# Patient Record
Sex: FEMALE | Race: WHITE | NOT HISPANIC OR LATINO | Employment: OTHER | URBAN - METROPOLITAN AREA
[De-identification: names, ages, dates, MRNs, and addresses within clinical notes are randomized per-mention and may not be internally consistent; named-entity substitution may affect disease eponyms.]

---

## 2017-08-10 ENCOUNTER — OFFICE VISIT (OUTPATIENT)
Dept: INTERNAL MEDICINE | Facility: CLINIC | Age: 67
End: 2017-08-10

## 2017-08-10 VITALS
DIASTOLIC BLOOD PRESSURE: 93 MMHG | TEMPERATURE: 98.6 F | SYSTOLIC BLOOD PRESSURE: 163 MMHG | HEART RATE: 77 BPM | OXYGEN SATURATION: 94 % | WEIGHT: 169 LBS | RESPIRATION RATE: 18 BRPM | BODY MASS INDEX: 29.94 KG/M2

## 2017-08-10 DIAGNOSIS — R63.4 WEIGHT LOSS: ICD-10-CM

## 2017-08-10 DIAGNOSIS — Z79.4 TYPE 2 DIABETES MELLITUS WITHOUT COMPLICATION, WITH LONG-TERM CURRENT USE OF INSULIN (H): ICD-10-CM

## 2017-08-10 DIAGNOSIS — E11.9 TYPE 2 DIABETES MELLITUS WITHOUT COMPLICATION, WITH LONG-TERM CURRENT USE OF INSULIN (H): ICD-10-CM

## 2017-08-10 DIAGNOSIS — J01.40 SUBACUTE PANSINUSITIS: ICD-10-CM

## 2017-08-10 DIAGNOSIS — J45.901 ASTHMA EXACERBATION: Primary | ICD-10-CM

## 2017-08-10 DIAGNOSIS — I10 BENIGN ESSENTIAL HYPERTENSION: ICD-10-CM

## 2017-08-10 LAB
ALBUMIN SERPL-MCNC: 3.4 G/DL (ref 3.4–5)
ALP SERPL-CCNC: 101 U/L (ref 40–150)
ALT SERPL W P-5'-P-CCNC: 18 U/L (ref 0–50)
ANION GAP SERPL CALCULATED.3IONS-SCNC: 9 MMOL/L (ref 3–14)
AST SERPL W P-5'-P-CCNC: 8 U/L (ref 0–45)
BILIRUB SERPL-MCNC: 0.4 MG/DL (ref 0.2–1.3)
BUN SERPL-MCNC: 23 MG/DL (ref 7–30)
CALCIUM SERPL-MCNC: 9.3 MG/DL (ref 8.5–10.1)
CHLORIDE SERPL-SCNC: 99 MMOL/L (ref 94–109)
CO2 SERPL-SCNC: 27 MMOL/L (ref 20–32)
CREAT SERPL-MCNC: 0.86 MG/DL (ref 0.52–1.04)
GFR SERPL CREATININE-BSD FRML MDRD: 66 ML/MIN/1.7M2
GLUCOSE SERPL-MCNC: 323 MG/DL (ref 70–99)
HBA1C MFR BLD: 12.8 % (ref 4.3–6)
POTASSIUM SERPL-SCNC: 4.4 MMOL/L (ref 3.4–5.3)
PROT SERPL-MCNC: 7.6 G/DL (ref 6.8–8.8)
SODIUM SERPL-SCNC: 135 MMOL/L (ref 133–144)
TSH SERPL DL<=0.005 MIU/L-ACNC: 1.44 MU/L (ref 0.4–4)

## 2017-08-10 RX ORDER — AMOXICILLIN AND CLAVULANATE POTASSIUM 500; 125 MG/1; MG/1
1 TABLET, FILM COATED ORAL 2 TIMES DAILY
Qty: 20 TABLET | Refills: 0 | Status: SHIPPED | OUTPATIENT
Start: 2017-08-10 | End: 2017-09-25

## 2017-08-10 RX ORDER — CARVEDILOL 6.25 MG/1
6.25 TABLET ORAL 2 TIMES DAILY WITH MEALS
Qty: 60 TABLET | Refills: 1 | Status: SHIPPED | OUTPATIENT
Start: 2017-08-10 | End: 2017-08-17

## 2017-08-10 RX ORDER — PREDNISONE 20 MG/1
20 TABLET ORAL DAILY
Qty: 9 TABLET | Refills: 0 | Status: SHIPPED | OUTPATIENT
Start: 2017-08-10 | End: 2017-08-17

## 2017-08-10 ASSESSMENT — PAIN SCALES - GENERAL: PAINLEVEL: SEVERE PAIN (6)

## 2017-08-10 NOTE — MR AVS SNAPSHOT
After Visit Summary   8/10/2017    Bettina Guerrero    MRN: [id number]           Patient Information     Date Of Birth          1950        Visit Information        Provider Department      8/10/2017 9:00 AM Trinity Choi APRN [facility]        Today's Diagnoses     Asthma exacerbation    -  1    Subacute pansinusitis        Weight loss        Type 2 diabetes mellitus without complication, with long-term current use of insulin (H)          Care Instructions    [facility] Medication Refill Request Information:  * Please contact your pharmacy regarding ANY request for medication refills.  ** [facility] Prescription Fax = [phone]  * Please allow 3 business days for routine medication refills.  * Please allow 5 business days for controlled substance medication refills.     [facility] Test Result notification information:  *You will be notified with in 7-10 days of your appointment day regarding the results of your test.  If you are on MyChart you will be notified as soon as the provider has reviewed the results and signed off on them.    Primary Care Center [phone]       Sinusitis (Antibiotic Treatment)    The sinuses are air-filled spaces within the bones of the face. They connect to the inside of the nose. Sinusitis is an inflammation of the tissue lining the sinus cavity. Sinus inflammation can occur during a cold. It can also be due to allergies to pollens and other particles in the air. Sinusitis can cause symptoms of sinus congestion and fullness. A sinus infection causes fever, headache and facial pain. There is often green or yellow drainage from the nose or into the back of the throat (post-nasal drip). You have been given antibiotics to treat this condition.  Home care:    Take the full course of antibiotics as instructed. Do not stop taking them, even if you feel better.    Drink plenty of water, hot tea, and other liquids. This may  help thin mucus. It also may promote sinus drainage.    Heat may help soothe painful areas of the face. Use a towel soaked in hot water. Or,  the shower and direct the hot spray onto your face. Using a vaporizer along with a menthol rub at night may also help.     An expectorant containing guaifenesin may help thin the mucus and promote drainage from the sinuses.    Over-the-counter decongestants may be used unless a similar medicine was prescribed. Nasal sprays work the fastest. Use one that contains phenylephrine or oxymetazoline. First blow the nose gently. Then use the spray. Do not use these medicines more often than directed on the label or symptoms may get worse. You may also use tablets containing pseudoephedrine. Avoid products that combine ingredients, because side effects may be increased. Read labels. You can also ask the pharmacist for help. (NOTE: Persons with high blood pressure should not use decongestants. They can raise blood pressure.)    Over-the-counter antihistamines may help if allergies contributed to your sinusitis.      Do not use nasal rinses or irrigation during an acute sinus infection, unless told to by your health care provider. Rinsing may spread the infection to other sinuses.    Use acetaminophen or ibuprofen to control pain, unless another pain medicine was prescribed. (If you have chronic liver or kidney disease or ever had a stomach ulcer, talk with your doctor before using these medicines. Aspirin should never be used in anyone under 18 years of age who is ill with a fever. It may cause severe liver damage.)    Don't smoke. This can worsen symptoms.  Follow-up care  Follow up with your healthcare provider or our staff if you are not improving within the next week.  When to seek medical advice  Call your healthcare provider if any of these occur:    Facial pain or headache becoming more severe    Stiff neck    Unusual drowsiness or confusion    Swelling of the forehead or  eyelids    Vision problems, including blurred or double vision    Fever of 100.4 F (38 C) or higher, or as directed by your healthcare provider    Seizure    Breathing problems    Symptoms not resolving within 10 days  Date Last Reviewed: 4/13/2015 2000-2017 [employer]. [address] [place] [address]. All rights reserved. This information is not intended as a substitute for professional medical care. Always follow your healthcare professional's instructions.                  Follow-ups after your visit        Follow-up notes from your care team     Return in about 1 week (around 8/17/2017) for diabetes.      Your next 10 appointments already scheduled     Aug 10, 2017 10:00 AM CDT   LAB with [facility] ([facility])    [address]  [address] [facility] [address] [phone]           Patient must bring picture ID. Patient should be prepared to give a urine specimen  Please do not eat 10-12 hours before your appointment if you are coming in fasting for labs on lipids, cholesterol, or glucose (sugar). Pregnant women should follow their Care Team instructions. Water with medications is okay. Do not drink coffee or other fluids. If you have concerns about taking  your medications, please ask at office or if scheduling via [employer], send a message by clicking on Secure Messaging, Message Your Care Team.            Aug 17, 2017 10:00 AM CDT   (Arrive by 9:45 AM)   ACUTE/CHRONIC SINGLE CONDITION with BROOKE Heredia CNP   [facility] ([facility])    [address] [place] [address]  [address] [facility] [address] [phone]              Future tests that were ordered for you today     Open Future Orders        Priority Expected Expires Ordered    Hemoglobin A1c Routine 8/10/2017 8/24/2017 8/10/2017    TSH with free T4 reflex Routine 8/10/2017 8/24/2017 8/10/2017    Comprehensive metabolic  panel Routine 8/10/2017 2017 8/10/2017            Who to contact     Please call your clinic at [phone] to:    Ask questions about your health    Make or cancel appointments    Discuss your medicines    Learn about your test results    Speak to your doctor   If you have compliments or concerns about an experience at your clinic, or if you wish to file a complaint, please contact [facility] Patient Relations at [phone] or email us at [email or web address]         Additional Information About Your Visit        [employer] Information     [employer] is an electronic gateway that provides easy, online access to your medical records. With [employer], you can request a clinic appointment, read your test results, renew a prescription or communicate with your care team.     To sign up for [employer] visit the website at [email or web address]   You will be asked to enter the access code listed below, as well as some personal information. Please follow the directions to create your username and password.     Your access code is: [email or web address]  Expires: 2017  6:31 AM     Your access code will  in 90 days. If you need help or a new code, please contact your [facility] or call [phone] for assistance.        Care EveryWhere ID     This is your Care EveryWhere ID. This could be used by other organizations to access your [facility] medical records  [id number]        Your Vitals Were     Pulse Temperature Respirations Pulse Oximetry Breastfeeding? BMI (Body Mass Index)    77 98.6  F (37  C) (Oral) 18 94% No 29.94 kg/m2       Blood Pressure from Last 3 Encounters:   08/10/17 (!) 175/119   16 (!) 167/96   10/18/16 118/80    Weight from Last 3 Encounters:   08/10/17 76.7 kg (169 lb)   16 79.4 kg (175 lb)   10/18/16 79.4 kg (175 lb)                 Today's Medication Changes          These changes are accurate as of: 8/10/17  9:57 AM.   If you have any questions, ask your nurse or doctor.               Start taking these medicines.        Dose/Directions    amoxicillin-clavulanate 500-125 MG per tablet   Commonly known as:  AUGMENTIN   Used for:  Subacute pansinusitis   Started by:  Trinity Choi APRN CNP        Dose:  1 tablet   Take 1 tablet by mouth 2 times daily   Quantity:  20 tablet   Refills:  0       Blood Glucose Monitoring Suppl W/DEVICE Kit   Used for:  Type 2 diabetes mellitus without complication, with long-term current use of insulin (H)   Started by:  Trinity Choi APRN CNP        Dose:  1 Device   1 Device once for 1 dose   Quantity:  1 kit   Refills:  0       predniSONE 20 MG tablet   Commonly known as:  DELTASONE   Used for:  Asthma exacerbation   Started by:  Trinity Choi APRN CNP        Dose:  20 mg   Take 1 tablet (20 mg) by mouth daily   Quantity:  9 tablet   Refills:  0         Stop taking these medicines if you haven't already. Please contact your care team if you have questions.     pseudoePHEDrine 30 MG tablet   Commonly known as:  SUDAFED   Stopped by:  Trinity Choi APRN CNP                Where to get your medicines      These medications were sent to [id number] - [place], [place] - [address] AT [facility] & [address], [place] [address]     Phone:  [phone]     amoxicillin-clavulanate 500-125 MG per tablet    Blood Glucose Monitoring Suppl W/DEVICE Kit    predniSONE 20 MG tablet                Primary Care Provider Office Phone # Fax #    Mulu Tiana Castellon [phone]       [address] [address]        Equal Access to Services     [facility] : Nina Webber, waghislaine taylor, qaybta kaaldany desai. So [facility] [phone].    ATENCIÓN: Si habla español, tiene a rene disposición servicios  thais de asistencia lingüística. Walter hutton [phone].    We comply with applicable federal civil rights laws and [place] laws. We do not discriminate on the basis of race, color, national origin, age, disability sex, sexual orientation or gender identity.            Thank you!     Thank you for choosing [facility]  for your care. Our goal is always to provide you with excellent care. Hearing back from our patients is one way we can continue to improve our services. Please take a few minutes to complete the written survey that you may receive in the mail after your visit with us. Thank you!             Your Updated Medication List - Protect others around you: Learn how to safely use, store and throw away your medicines at www.disposemymeds.org.          This list is accurate as of: 8/10/17  9:57 AM.  Always use your most recent med list.                   Brand Name Dispense Instructions for use Diagnosis    albuterol 108 (90 BASE) MCG/ACT Inhaler    PROAIR HFA/PROVENTIL HFA/VENTOLIN HFA    1 Inhaler    Inhale 2 puffs into the lungs every 6 hours as needed for shortness of breath / dyspnea or wheezing    Allergic rhinitis due to pollen       amLODIPine 10 MG tablet    NORVASC    90 tablet    Take 1 tablet (10 mg) by mouth daily    Benign essential hypertension       amoxicillin-clavulanate 500-125 MG per tablet    AUGMENTIN    20 tablet    Take 1 tablet by mouth 2 times daily    Subacute pansinusitis       blood glucose monitoring lancets     1 Box    Please check your glucose twice daily    Diabetes mellitus without complication (H)       Blood Glucose Monitoring Suppl W/DEVICE Kit     1 kit    1 Device once for 1 dose    Type 2 diabetes mellitus without complication, with long-term current use of insulin (H)       blood glucose monitoring test strip    no brand specified    100 each    For Accu-Chek SmartView3 month supply, for testing glucose up to 4 times a day    Type 2 diabetes  mellitus without complication, with long-term current use of insulin (H)       hydrochlorothiazide 25 MG tablet    HYDRODIURIL    180 tablet    Take 2 tablets (50 mg) by mouth daily    Benign essential hypertension       HYDROcodone-acetaminophen 5-325 MG per tablet    NORCO    20 tablet    Take 1-2 tablets by mouth every 4 hours as needed for other (Moderate to Severe Pain)    Intraductal papilloma of right breast       insulin glargine 100 UNIT/ML injection    LANTUS SOLOSTAR    18 mL    Inject 18 Units Subcutaneous At Bedtime 18 units at bedtime.    Type 2 diabetes mellitus without complication, with long-term current use of insulin (H)       insulin lispro 100 UNIT/ML injection    HumaLOG KWIKpen    3 mL    Inject 10 Units Subcutaneous 3 times daily (before meals) Use 6 units with breakfast, 10 units with lunch and 8 units with dinner    Type 2 diabetes mellitus without complication, with long-term current use of insulin (H)       lisinopril 40 MG tablet    PRINIVIL/ZESTRIL    90 tablet    Take 1 tablet (40 mg) by mouth At Bedtime    Benign essential hypertension       metFORMIN 1000 MG tablet    GLUCOPHAGE    60 tablet    Take 1 tablet (1,000 mg) by mouth 2 times daily (with meals)    Type 2 diabetes mellitus without complication (H)       predniSONE 20 MG tablet    DELTASONE    9 tablet    Take 1 tablet (20 mg) by mouth daily    Asthma exacerbation       simvastatin 40 MG tablet    ZOCOR    90 tablet    Take 1 tablet (40 mg) by mouth At Bedtime    Mixed hyperlipidemia

## 2017-08-10 NOTE — PATIENT INSTRUCTIONS
[facility] Medication Refill Request Information:  * Please contact your pharmacy regarding ANY request for medication refills.  ** [facility] Prescription Fax = [phone]  * Please allow 3 business days for routine medication refills.  * Please allow 5 business days for controlled substance medication refills.     [facility] Test Result notification information:  *You will be notified with in 7-10 days of your appointment day regarding the results of your test.  If you are on MyChart you will be notified as soon as the provider has reviewed the results and signed off on them.    [facility] [phone]       Sinusitis (Antibiotic Treatment)    The sinuses are air-filled spaces within the bones of the face. They connect to the inside of the nose. Sinusitis is an inflammation of the tissue lining the sinus cavity. Sinus inflammation can occur during a cold. It can also be due to allergies to pollens and other particles in the air. Sinusitis can cause symptoms of sinus congestion and fullness. A sinus infection causes fever, headache and facial pain. There is often green or yellow drainage from the nose or into the back of the throat (post-nasal drip). You have been given antibiotics to treat this condition.  Home care:    Take the full course of antibiotics as instructed. Do not stop taking them, even if you feel better.    Drink plenty of water, hot tea, and other liquids. This may help thin mucus. It also may promote sinus drainage.    Heat may help soothe painful areas of the face. Use a towel soaked in hot water. Or,  the shower and direct the hot spray onto your face. Using a vaporizer along with a menthol rub at night may also help.     An expectorant containing guaifenesin may help thin the mucus and promote drainage from the sinuses.    Over-the-counter decongestants may be used unless a similar medicine was prescribed. Nasal sprays work the fastest. Use one that contains  phenylephrine or oxymetazoline. First blow the nose gently. Then use the spray. Do not use these medicines more often than directed on the label or symptoms may get worse. You may also use tablets containing pseudoephedrine. Avoid products that combine ingredients, because side effects may be increased. Read labels. You can also ask the pharmacist for help. (NOTE: Persons with high blood pressure should not use decongestants. They can raise blood pressure.)    Over-the-counter antihistamines may help if allergies contributed to your sinusitis.      Do not use nasal rinses or irrigation during an acute sinus infection, unless told to by your health care provider. Rinsing may spread the infection to other sinuses.    Use acetaminophen or ibuprofen to control pain, unless another pain medicine was prescribed. (If you have chronic liver or kidney disease or ever had a stomach ulcer, talk with your doctor before using these medicines. Aspirin should never be used in anyone under 18 years of age who is ill with a fever. It may cause severe liver damage.)    Don't smoke. This can worsen symptoms.  Follow-up care  Follow up with your healthcare provider or our staff if you are not improving within the next week.  When to seek medical advice  Call your healthcare provider if any of these occur:    Facial pain or headache becoming more severe    Stiff neck    Unusual drowsiness or confusion    Swelling of the forehead or eyelids    Vision problems, including blurred or double vision    Fever of 100.4 F (38 C) or higher, or as directed by your healthcare provider    Seizure    Breathing problems    Symptoms not resolving within 10 days  Date Last Reviewed: 4/13/2015 2000-2017 [employer]. [address] [place] [address], [place] [address]. All rights reserved. This information is not intended as a substitute for professional medical care. Always follow your healthcare professional's instructions.

## 2017-08-10 NOTE — NURSING NOTE
Chief Complaint   Patient presents with     URI     Patient is here for ongoing URI and sinus concerns, duration 2 weeks     Shaneka Kitchen CMA 8:52 AM on 8/10/2017.
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
## 2017-08-10 NOTE — LETTER
[facility]  [address]  [address]th [address]  [place] [address]          August 10, 2017    RE:  Bettina Guerrero                                                                                                                                                       [address]   [place] [address]            To whom it may concern:    Bettina Guerrero was seen in clinic on 8/10/17. She should be excused from work until 24-hours after starting antibiotics. She may return to work on 8/12/17.    Sincerely,        Trinity Choi, APRN, CNP

## 2017-08-17 ENCOUNTER — OFFICE VISIT (OUTPATIENT)
Dept: INTERNAL MEDICINE | Facility: CLINIC | Age: 67
End: 2017-08-17

## 2017-08-17 VITALS
WEIGHT: 166.3 LBS | RESPIRATION RATE: 20 BRPM | OXYGEN SATURATION: 96 % | DIASTOLIC BLOOD PRESSURE: 78 MMHG | BODY MASS INDEX: 29.46 KG/M2 | HEART RATE: 75 BPM | SYSTOLIC BLOOD PRESSURE: 136 MMHG

## 2017-08-17 DIAGNOSIS — Z23 NEED FOR PNEUMOCOCCAL VACCINATION: ICD-10-CM

## 2017-08-17 DIAGNOSIS — Z13.5 SCREENING FOR DIABETIC RETINOPATHY: ICD-10-CM

## 2017-08-17 DIAGNOSIS — E11.9 TYPE 2 DIABETES MELLITUS WITHOUT COMPLICATION, WITH LONG-TERM CURRENT USE OF INSULIN (H): Primary | ICD-10-CM

## 2017-08-17 DIAGNOSIS — E78.2 MIXED HYPERLIPIDEMIA: ICD-10-CM

## 2017-08-17 DIAGNOSIS — I10 BENIGN ESSENTIAL HYPERTENSION: ICD-10-CM

## 2017-08-17 DIAGNOSIS — Z79.4 TYPE 2 DIABETES MELLITUS WITHOUT COMPLICATION, WITH LONG-TERM CURRENT USE OF INSULIN (H): Primary | ICD-10-CM

## 2017-08-17 RX ORDER — SIMVASTATIN 40 MG
40 TABLET ORAL AT BEDTIME
Qty: 90 TABLET | Refills: 3 | Status: SHIPPED | OUTPATIENT
Start: 2017-08-17 | End: 2017-09-28 | Stop reason: ALTCHOICE

## 2017-08-17 RX ORDER — METFORMIN HCL 500 MG
500 TABLET, EXTENDED RELEASE 24 HR ORAL 2 TIMES DAILY WITH MEALS
Qty: 60 TABLET | Refills: 3 | Status: SHIPPED | OUTPATIENT
Start: 2017-08-17 | End: 2018-06-08

## 2017-08-17 RX ORDER — HYDROCHLOROTHIAZIDE 25 MG/1
50 TABLET ORAL DAILY
Qty: 180 TABLET | Refills: 3 | Status: SHIPPED | OUTPATIENT
Start: 2017-08-17 | End: 2018-06-08

## 2017-08-17 RX ORDER — AMLODIPINE BESYLATE 10 MG/1
10 TABLET ORAL DAILY
Qty: 90 TABLET | Refills: 3 | Status: SHIPPED | OUTPATIENT
Start: 2017-08-17 | End: 2018-06-08

## 2017-08-17 RX ORDER — LISINOPRIL 40 MG/1
40 TABLET ORAL AT BEDTIME
Qty: 90 TABLET | Refills: 3 | Status: SHIPPED | OUTPATIENT
Start: 2017-08-17 | End: 2018-06-08

## 2017-08-17 ASSESSMENT — PAIN SCALES - GENERAL: PAINLEVEL: NO PAIN (0)

## 2017-08-17 NOTE — NURSING NOTE
Chief Complaint   Patient presents with     Diabetes     pt is here for a diabetes follwo up        Catrina Guillen CMA at 9:59 AM on 8/17/2017

## 2017-08-17 NOTE — MR AVS SNAPSHOT
After Visit Summary   8/17/2017    Bettina Guerrero    MRN: [id number]           Patient Information     Date Of Birth          1950        Visit Information        Provider Department      8/17/2017 10:00 AM Trinity Choi APRN [facility]        Today's Diagnoses     Type 2 diabetes mellitus without complication, with long-term current use of insulin (H)    -  1    Screening for diabetic retinopathy        Benign essential hypertension        Mixed hyperlipidemia        Need for pneumococcal vaccination          Care Instructions    Primary Care Center Phone Number [phone]  Primary Care Center Medication Refill Request Information:  * Please contact your pharmacy regarding ANY request for medication refills.  ** [facility] Prescription Fax = [phone]  * Please allow 3 business days for routine medication refills.  * Please allow 5 business days for controlled substance medication refills.     Primary Care Center Test Result notification information:  *You will be notified with in 7-10 days of your appointment day regarding the results of your test.  If you are on MyChart you will be notified as soon as the provider has reviewed the results and signed off on them.        Restart your Metformin. We will do the extended release formula to help reduce side effects. Start with 500 mg (1 tablet) twice a day for 2 weeks, then increase to 1000 mg (2 tablets) twice a day and stay on that dose.              Follow-ups after your visit        Additional Services     DIABETES EDUCATOR REFERRAL       DIABETES SELF MANAGEMENT TRAINING (DSMT)      Your provider has referred you to Diabetes Education: [facility]: Endocrinology and Diabetes Clinic -  [facility] [phone]   [email or web address]    Type of training and number of hours: Previous Diagnosis: Follow-up DSMT - 2 hours.    Medicare covers: 10  hours of initial DSMT in 12 month period from the time of first visit, plus 2 hours of follow-up DSMT annually, and additional hours as requested for insulin training.    Diabetes Type: Type 2 - On Oral Medication             Diabetes Co-Morbidities: hypertension               A1C Goal:  <7.0       A1C is: Lab Results       Component                Value               Date                       A1C                      12.8                08/10/2017              If an urgent visit is needed or A1C is above 12, Care Team to call the Diabetes Education Team at [phone] or send an In Basket message to the Diabetes Education Pool (P DIAB ED-PATIENT CARE).    Diabetes Education Topics: Comprehensive Knowledge Assessment and Instruction    Special Educational Needs Requiring Individual DSMT: None       MEDICAL NUTRITION THERAPY (MNT) for Diabetes    Medical Nutrition Therapy with a Registered Dietitian can be provided in coordination with Diabetes Self-Management Training to assist in achieving optimal diabetes management.    MNT Type and Hours: Previous diagnosis: Annual follow-up MNT - 2 hours                       Medicare will cover: 3 hours initial MNT in 12 month period after first visit, plus 2 hours of follow-up MNT annually    Please be aware that coverage of these services is subject to the terms and limitations of your health insurance plan.  Call member services at your health plan to determine Diabetes Self-Management Training benefits and ask which blood glucose monitor brands are covered by your plan.      Please bring the following with you to your appointment:    (1)  List of current medications   (2)  List of Blood Glucose Monitor brands that are covered by your insurance plan  (3)  Blood Glucose Monitor and log book  (4)   Food records for the 3 days prior to your visit    The Certified Diabetes Educator may make diabetes medication adjustments per the CDE Protocol and Collaborative Practice  Agreement.                  Follow-up notes from your care team     Return in about 4 weeks (around 9/14/2017) for Physical Exam.      Your next 10 appointments already scheduled     Aug 24, 2017  3:30 PM CDT   (Arrive by 3:15 PM)   Office Visit with Sloane Alfred RN   [facility] ([facility])    [address] [place] [address]  [address] [facility] [address] [phone]           Bring a current list of meds and any records pertaining to this visit. For Physicals, please bring immunization records and any forms needing to be filled out. Please arrive 10 minutes early to complete paperwork.            Sep 14, 2017 10:30 AM CDT   (Arrive by 10:15 AM)   PHYSICAL with BROOKE Heredia CNP   [facility] Primary Care Clinic ([facility])    [address] [place] [address] [phone]              Who to contact     Please call your clinic at [phone] to:    Ask questions about your health    Make or cancel appointments    Discuss your medicines    Learn about your test results    Speak to your doctor   If you have compliments or concerns about an experience at your clinic, or if you wish to file a complaint, please contact [facility] Patient Relations at [phone] or email us at [email or web address]         Additional Information About Your Visit        MyChart Information     [employer] is an electronic gateway that provides easy, online access to your medical records. With [facility] [employer], you can request a clinic appointment, read your test results, renew a prescription or communicate with your care team.     To sign up for [employer] visit the website at [email or web address]   You will be asked to enter the access code listed below, as well as some personal information. Please follow the directions to create your username and password.     Your access code is: [email or web address]  Expires:  2017  6:31 AM     Your access code will  in 90 days. If you need help or a new code, please contact your [facility] or call [phone] for assistance.        Care EveryWhere ID     This is your Care EveryWhere ID. This could be used by other organizations to access your [place] medical records  [id number]        Your Vitals Were     Pulse Respirations Pulse Oximetry Breastfeeding? BMI (Body Mass Index)       75 20 96% No 29.46 kg/m2        Blood Pressure from Last 3 Encounters:   17 136/78   08/10/17 (!) 163/93   16 (!) 167/96    Weight from Last 3 Encounters:   17 75.4 kg (166 lb 4.8 oz)   08/10/17 76.7 kg (169 lb)   16 79.4 kg (175 lb)              We Performed the Following     DIABETES EDUCATOR REFERRAL     Pneumococcal vaccine 23 valent PPSV23  (Pneumovax) [37642]          Today's Medication Changes          These changes are accurate as of: 17 10:46 AM.  If you have any questions, ask your nurse or doctor.               Start taking these medicines.        Dose/Directions    metFORMIN 500 MG 24 hr tablet   Commonly known as:  GLUCOPHAGE-XR   Used for:  Type 2 diabetes mellitus without complication, with long-term current use of insulin (H)   Started by:  Trinity Choi APRN CNP        Dose:  500 mg   Take 1 tablet (500 mg) by mouth 2 times daily (with meals)   Quantity:  60 tablet   Refills:  3            Where to get your medicines      These medications were sent to [id number] - [place], [place] - [address], [place] [address]     Phone:  [phone]     amLODIPine 10 MG tablet    hydrochlorothiazide 25 MG tablet    lisinopril 40 MG tablet    metFORMIN 500 MG 24 hr tablet    simvastatin 40 MG tablet                Primary Care Provider Office Phone # Fax #    BROOKE Heredia [phone]       [address] [place] [address]  [facility] [address]        Equal Access to Services     IVORY FARAH : Hadii aad ku hadnoemyjermaine Sohyun, waaxda luqadaha, qaybta kaalmada hanane, dany reddy. So [facility] [phone].    ATENCIÓN: Si habla español, tiene a rene disposición servicios gratuitos de asistencia lingüística. Lvame al [phone].    We comply with applicable federal civil rights laws and [place] laws. We do not discriminate on the basis of race, color, national origin, age, disability sex, sexual orientation or gender identity.            Thank you!     Thank you for choosing [facility]  for your care. Our goal is always to provide you with excellent care. Hearing back from our patients is one way we can continue to improve our services. Please take a few minutes to complete the written survey that you may receive in the mail after your visit with us. Thank you!             Your Updated Medication List - Protect others around you: Learn how to safely use, store and throw away your medicines at www.disposemymeds.org.          This list is accurate as of: 8/17/17 10:46 AM.  Always use your most recent med list.                   Brand Name Dispense Instructions for use Diagnosis    albuterol 108 (90 BASE) MCG/ACT Inhaler    PROAIR HFA/PROVENTIL HFA/VENTOLIN HFA    1 Inhaler    Inhale 2 puffs into the lungs every 6 hours as needed for shortness of breath / dyspnea or wheezing    Allergic rhinitis due to pollen       amLODIPine 10 MG tablet    NORVASC    90 tablet    Take 1 tablet (10 mg) by mouth daily    Benign essential hypertension       amoxicillin-clavulanate 500-125 MG per tablet    AUGMENTIN    20 tablet    Take 1 tablet by mouth 2 times daily    Subacute pansinusitis       blood glucose monitoring lancets     100 each    Please check your glucose twice daily        blood glucose monitoring test strip    no brand specified    100 each    For Accu-Chek SmartView3 month supply, for  testing glucose up to 4 times a day    Type 2 diabetes mellitus without complication, with long-term current use of insulin (H)       hydrochlorothiazide 25 MG tablet    HYDRODIURIL    180 tablet    Take 2 tablets (50 mg) by mouth daily    Benign essential hypertension       insulin glargine 100 UNIT/ML injection    LANTUS SOLOSTAR    18 mL    Inject 18 Units Subcutaneous At Bedtime 18 units at bedtime.    Type 2 diabetes mellitus without complication, with long-term current use of insulin (H)       insulin lispro 100 UNIT/ML injection    HumaLOG KWIKpen    3 mL    Inject 10 Units Subcutaneous 3 times daily (before meals) Use 6 units with breakfast, 10 units with lunch and 8 units with dinner    Type 2 diabetes mellitus without complication, with long-term current use of insulin (H)       lisinopril 40 MG tablet    PRINIVIL/ZESTRIL    90 tablet    Take 1 tablet (40 mg) by mouth At Bedtime    Benign essential hypertension       metFORMIN 500 MG 24 hr tablet    GLUCOPHAGE-XR    60 tablet    Take 1 tablet (500 mg) by mouth 2 times daily (with meals)    Type 2 diabetes mellitus without complication, with long-term current use of insulin (H)       simvastatin 40 MG tablet    ZOCOR    90 tablet    Take 1 tablet (40 mg) by mouth At Bedtime    Mixed hyperlipidemia

## 2017-08-17 NOTE — PROGRESS NOTES
"Bettina Guerrero is a 67 year old female who comes in for    CC: lab follow-up  HPI:    Breathing has improved--no longer wheezing or SOB. Still has nasal congestion and drainage. Has ~3 more days of Augmentin.    At her last visit, Bettina had labs drawn and A1C was found to be elevated at 12.8. She has not been treating her diabetes for \"months\". Metformin--didn't feel she tolerated it well d/t diarrhea, has been off for 6 months to a year. Stopped the insulin a few months ago, couldn't afford it and thought it was making her hair fall out. \"[place] [facility] She does not check blood glucoses. She has been encouraged by weight loss--this has been unintentional. She denies vision changes, CP, neuropathy, or urinary changes.  Dr. Castellon wanted her coming to clinic every 3 months, but her  thought there was something wrong with her, and she didn't understand why she needed to come so frequently. She also couldn't afford to come so often. She hasn't returned for follow-up.     Other issues discussed today:     Patient Active Problem List   Diagnosis     Diabetes mellitus (H)     Hypertension     Asthma in adult     Excessive daytime sleepiness     Snoring     Hyperlipidemia LDL goal <100     Intraductal papilloma of right breast       Current Outpatient Prescriptions   Medication Sig Dispense Refill     metFORMIN (GLUCOPHAGE-XR) 500 MG 24 hr tablet Take 1 tablet (500 mg) by mouth 2 times daily (with meals) 60 tablet 3     lisinopril (PRINIVIL/ZESTRIL) 40 MG tablet Take 1 tablet (40 mg) by mouth At Bedtime 90 tablet 3     hydrochlorothiazide (HYDRODIURIL) 25 MG tablet Take 2 tablets (50 mg) by mouth daily 180 tablet 3     simvastatin (ZOCOR) 40 MG tablet Take 1 tablet (40 mg) by mouth At Bedtime 90 tablet 3     amLODIPine (NORVASC) 10 MG tablet Take 1 tablet (10 mg) by mouth daily 90 tablet 3     amoxicillin-clavulanate (AUGMENTIN) 500-125 MG per tablet Take 1 tablet by mouth 2 times " daily 20 tablet 0     blood glucose monitoring (ACCU-CHEK MULTICLIX) lancets Please check your glucose twice daily 100 each 3     blood glucose monitoring (NO BRAND SPECIFIED) test strip For Accu-Chek SmartView3 month supply, for testing glucose up to 4 times a day 100 each 0     albuterol (PROAIR HFA, PROVENTIL HFA, VENTOLIN HFA) 108 (90 BASE) MCG/ACT inhaler Inhale 2 puffs into the lungs every 6 hours as needed for shortness of breath / dyspnea or wheezing 1 Inhaler 1     insulin glargine (LANTUS SOLOSTAR) 100 UNIT/ML PEN Inject 18 Units Subcutaneous At Bedtime 18 units at bedtime. (Patient not taking: Reported on 8/17/2017) 18 mL 3     insulin lispro (HUMALOG KWIKPEN) 100 UNIT/ML soln Inject 10 Units Subcutaneous 3 times daily (before meals) Use 6 units with breakfast, 10 units with lunch and 8 units with dinner (Patient not taking: Reported on 8/17/2017) 3 mL 11     [DISCONTINUED] amLODIPine (NORVASC) 10 MG tablet Take 1 tablet (10 mg) by mouth daily 90 tablet 3     [DISCONTINUED] simvastatin (ZOCOR) 40 MG tablet Take 1 tablet (40 mg) by mouth At Bedtime 90 tablet 3     [DISCONTINUED] hydrochlorothiazide (HYDRODIURIL) 25 MG tablet Take 2 tablets (50 mg) by mouth daily 180 tablet 3     [DISCONTINUED] lisinopril (PRINIVIL,ZESTRIL) 40 MG tablet Take 1 tablet (40 mg) by mouth At Bedtime 90 tablet 3     [DISCONTINUED] metFORMIN (GLUCOPHAGE) 1000 MG tablet Take 1 tablet (1,000 mg) by mouth 2 times daily (with meals) 60 tablet 5         ALLERGIES: Seasonal allergies    PAST MEDICAL HX:   Past Medical History:   Diagnosis Date     Asthma in adult      Diabetes mellitus (H)      Hypertension        PAST SURGICAL HX:   Past Surgical History:   Procedure Laterality Date     CHOLECYSTECTOMY       COLONOSCOPY N/A 5/11/2016    Procedure: COLONOSCOPY;  Surgeon: Ady Sandoval MD;  Location:  GI     LAPAROSCOPIC TUBAL LIGATION       LUMPECTOMY BREAST Right 6/22/2016    Procedure: LUMPECTOMY BREAST;  Surgeon: Shameka Nino  MD Bibi;  Location: [facility]       IMMUNIZATION HX:   Immunization History   Administered Date(s) Administered     Influenza (IIV3) 10/01/2014     Pneumococcal (PCV 13) 04/27/2016     Pneumococcal 23 valent 02/28/2006     TDAP Vaccine (Boostrix) 04/27/2016     Tdap (Adacel,Boostrix) 02/28/2006       ROS:   CONSTITUTIONAL: no fatigue, no unexpected change in weight  SKIN: no worrisome rashes, no worrisome moles, no worrisome lesions  EYES: no acute vision problems or changes  ENT: no ear problems, no mouth problems, no throat problems, +nasal drainage  RESP: no significant cough, no shortness of breath  CV: no chest pain, no palpitations, no new or worsening peripheral edema  GI: no nausea, no vomiting, no constipation, no diarrhea    OBJECTIVE:  /78  Pulse 75  Resp 20  Wt 75.4 kg (166 lb 4.8 oz)  SpO2 96%  Breastfeeding? No  BMI 29.46 kg/m2   Wt Readings from Last 1 Encounters:   08/17/17 75.4 kg (166 lb 4.8 oz)     Constitutional: no distress, comfortable, pleasant, well-groomed  Eyes: anicteric, conjunctiva pink, normal extra-ocular movements   Ears, Nose and Throat: tympanic membranes pearly gray with positive light reflex, EACs clear bilaterally, nose clear and free of lesions, throat clear, mucosa pink and moist.   Neck: supple with full range of motion, no thyromegaly, no lymphadenopathy  Cardiovascular: regular rate and rhythm, normal S1 and S2, no murmurs, rubs or gallops  Respiratory: clear to auscultation with good air movement bilaterally, no wheezes or crackles, non-labored  Psychological: appropriate mood, demonstrates intact judgment and logical thought process      ASSESSMENT/PLAN:    1. Type 2 diabetes mellitus without complication, with long-term current use of insulin (H)  Restart Metformin, will try extended release to see if more tolerable. Advised to restart insulin, though will need to look into more affordable options, as she has not been consistent with use d/t cost. Reviewed that  hair loss is not typical side effect of insulin. Restart checking blood glucoses. Work on reducing carbs in diet. Referred to diabetic educator to reinforce home glucose monitoring and insulin.   - metFORMIN (GLUCOPHAGE-XR) 500 MG 24 hr tablet; Take 1 tablet (500 mg) by mouth 2 times daily (with meals)  Dispense: 60 tablet; Refill: 3  - DIABETES EDUCATOR REFERRAL    2. Screening for diabetic retinopathy  Records requested from eye clinic--she reports she is up-to-date on her eye exams.    3. Benign essential hypertension  Refills provided. BP is controlled in clinic; she did not start the carvedilol after last visit, attributes her elevated BP to feeling anxious at that time. Continue with current regimen.  - lisinopril (PRINIVIL/ZESTRIL) 40 MG tablet; Take 1 tablet (40 mg) by mouth At Bedtime  Dispense: 90 tablet; Refill: 3  - hydrochlorothiazide (HYDRODIURIL) 25 MG tablet; Take 2 tablets (50 mg) by mouth daily  Dispense: 180 tablet; Refill: 3  - amLODIPine (NORVASC) 10 MG tablet; Take 1 tablet (10 mg) by mouth daily  Dispense: 90 tablet; Refill: 3    4. Mixed hyperlipidemia  Refill provided.  - simvastatin (ZOCOR) 40 MG tablet; Take 1 tablet (40 mg) by mouth At Bedtime  Dispense: 90 tablet; Refill: 3    5. Need for pneumococcal vaccination  Risks and benefits discussed, vaccine administered in clinic today.  - Pneumococcal vaccine 23 valent PPSV23  (Pneumovax) [47694]    FOLLOW UP: Return in about 4 weeks (around 9/14/2017) for Physical Exam.     BROOKE Sarabia CNP

## 2017-08-17 NOTE — NURSING NOTE
Immunization(s) was given, tolerated well. See immunizations for more details. Vivian Lester LPN at 10:55 AM on 8/17/2017.

## 2017-08-17 NOTE — PATIENT INSTRUCTIONS
Primary Care Center Phone Number [phone]  Primary [facility] Medication Refill Request Information:  * Please contact your pharmacy regarding ANY request for medication refills.  ** [facility] Prescription Fax = [phone]  * Please allow 3 business days for routine medication refills.  * Please allow 5 business days for controlled substance medication refills.     Primary Care Center Test Result notification information:  *You will be notified with in 7-10 days of your appointment day regarding the results of your test.  If you are on MyChart you will be notified as soon as the provider has reviewed the results and signed off on them.        Restart your Metformin. We will do the extended release formula to help reduce side effects. Start with 500 mg (1 tablet) twice a day for 2 weeks, then increase to 1000 mg (2 tablets) twice a day and stay on that dose.

## 2017-08-17 NOTE — Clinical Note
Meredith Frias Colleen stopped using her insulin because she was having trouble affording it. Anything we can do to help with that? Her diabetes is completely out of control. Thanks, Trinity

## 2017-09-25 ENCOUNTER — OFFICE VISIT (OUTPATIENT)
Dept: INTERNAL MEDICINE | Facility: CLINIC | Age: 67
End: 2017-09-25

## 2017-09-25 VITALS
DIASTOLIC BLOOD PRESSURE: 72 MMHG | RESPIRATION RATE: 20 BRPM | BODY MASS INDEX: 30.1 KG/M2 | SYSTOLIC BLOOD PRESSURE: 122 MMHG | WEIGHT: 169.9 LBS | HEART RATE: 75 BPM

## 2017-09-25 DIAGNOSIS — Z13.220 SCREENING FOR HYPERLIPIDEMIA: ICD-10-CM

## 2017-09-25 DIAGNOSIS — Z13.89 SCREENING FOR DIABETIC PERIPHERAL NEUROPATHY: ICD-10-CM

## 2017-09-25 DIAGNOSIS — Z12.31 ENCOUNTER FOR SCREENING MAMMOGRAM FOR BREAST CANCER: ICD-10-CM

## 2017-09-25 DIAGNOSIS — Z79.4 TYPE 2 DIABETES MELLITUS WITHOUT COMPLICATION, WITH LONG-TERM CURRENT USE OF INSULIN (H): ICD-10-CM

## 2017-09-25 DIAGNOSIS — E11.9 TYPE 2 DIABETES MELLITUS WITHOUT COMPLICATION, WITH LONG-TERM CURRENT USE OF INSULIN (H): ICD-10-CM

## 2017-09-25 DIAGNOSIS — Z00.00 ROUTINE HISTORY AND PHYSICAL EXAMINATION OF ADULT: Primary | ICD-10-CM

## 2017-09-25 DIAGNOSIS — Z23 NEED FOR PROPHYLACTIC VACCINATION AND INOCULATION AGAINST INFLUENZA: ICD-10-CM

## 2017-09-25 LAB
CHOLEST SERPL-MCNC: 208 MG/DL
CREAT UR-MCNC: 136 MG/DL
HDLC SERPL-MCNC: 47 MG/DL
LDLC SERPL CALC-MCNC: 119 MG/DL
MICROALBUMIN UR-MCNC: 64 MG/L
MICROALBUMIN/CREAT UR: 46.91 MG/G CR (ref 0–25)
NONHDLC SERPL-MCNC: 160 MG/DL
TRIGL SERPL-MCNC: 206 MG/DL

## 2017-09-25 RX ORDER — BLOOD-GLUCOSE METER
EACH MISCELLANEOUS
Qty: 1 KIT | Refills: 0 | Status: SHIPPED | OUTPATIENT
Start: 2017-09-25 | End: 2018-06-08

## 2017-09-25 ASSESSMENT — PAIN SCALES - GENERAL: PAINLEVEL: NO PAIN (0)

## 2017-09-25 NOTE — PATIENT INSTRUCTIONS
[facility]: [phone]     [facility] Medication Refill Request Information:  * Please contact your pharmacy regarding ANY request for medication refills.  ** [facility] Prescription Fax = [phone]  * Please allow 3 business days for routine medication refills.  * Please allow 5 business days for controlled substance medication refills.     [facility] Test Result notification information:  *You will be notified with in 7-10 days of your appointment day regarding the results of your test.  If you are on MyChart you will be notified as soon as the provider has reviewed the results and signed off on them.      Restart your Insulin. Check your blood sugars at least 3 times per day (fasting in the morning, and with lunch and dinner). Check your blood sugar if you have any signs or symptoms of low blood sugar (nervousness, shakiness, dizziness, headache, sweating, rapid heart rate or confusion). See below.    Follow-up in 1 month to check diabetes.       Step-by-Step:  Treating Low Blood Sugar (Hypoglycemia)    Date Last Reviewed: 12/1/2016 2000-2017 [employer]. [address] [place] [address]. All rights reserved. This information is not intended as a substitute for professional medical care. Always follow your healthcare professional's instructions.

## 2017-09-25 NOTE — NURSING NOTE
Chief Complaint   Patient presents with     Physical     established physical     Recheck Medication     discuss metformin   Christine Escalante LPN 3:39 PM on 9/25/2017

## 2017-09-25 NOTE — NURSING NOTE
High dose flu shot given without problems, patient tolerated procedure well.Christine Escalante LPN 4:29 PM on 9/25/2017

## 2017-09-25 NOTE — MR AVS SNAPSHOT
After Visit Summary   9/25/2017    Bettina Guerrero    MRN: [id number]           Patient Information     Date Of Birth          1950        Visit Information        Provider Department      9/25/2017 3:20 PM Trinity Choi APRN [facility]        Today's Diagnoses     Routine history and physical examination of adult    -  1    At risk for falling        Need for prophylactic vaccination and inoculation against influenza        Screening for diabetic peripheral neuropathy        Type 2 diabetes mellitus without complication, with long-term current use of insulin (H)        Screening for hyperlipidemia        Encounter for screening mammogram for breast cancer          Care Instructions    [facility]: [phone]     [facility] Medication Refill Request Information:  * Please contact your pharmacy regarding ANY request for medication refills.  ** [facility] Prescription Fax = [phone]  * Please allow 3 business days for routine medication refills.  * Please allow 5 business days for controlled substance medication refills.     Primary Care Center Test Result notification information:  *You will be notified with in 7-10 days of your appointment day regarding the results of your test.  If you are on MyChart you will be notified as soon as the provider has reviewed the results and signed off on them.      Restart your Insulin. Check your blood sugars at least 3 times per day (fasting in the morning, and with lunch and dinner). Check your blood sugar if you have any signs or symptoms of low blood sugar (nervousness, shakiness, dizziness, headache, sweating, rapid heart rate or confusion). See below.    Follow-up in 1 month to check diabetes.       Step-by-Step:  Treating Low Blood Sugar (Hypoglycemia)    Date Last Reviewed: 12/1/2016 2000-2017 [employer]. [address] [place] [address], [place] [address]. All rights reserved. This  information is not intended as a substitute for professional medical care. Always follow your healthcare professional's instructions.                    Follow-ups after your visit        Follow-up notes from your care team     Return in about 4 weeks (around 10/23/2017).      Your next 10 appointments already scheduled     Sep 25, 2017  5:15 PM CDT   LAB with  LAB   [facility] ([facility])    [address] [place] [address] [id number] [phone]           Patient must bring picture ID. Patient should be prepared to give a urine specimen  Please do not eat 10-12 hours before your appointment if you are coming in fasting for labs on lipids, cholesterol, or glucose (sugar). Pregnant women should follow their Care Team instructions. Water with medications is okay. Do not drink coffee or other fluids. If you have concerns about taking  your medications, please ask at office or if scheduling via [email or web address], send a message by clicking on Secure Messaging, Message Your Care Team.            Sep 29, 2017  2:30 PM CDT   (Arrive by 2:15 PM)   MA SCREENING DIGITAL BILATERAL with [id number]   [facility] ([facility])    [address] [place] [address] [id number] [phone]           Do not use any powder, lotion or deodorant under your arms or on your breast. If you do, we will ask you to remove it before your exam.  Wear comfortable, two-piece clothing.  If you have any allergies, tell your care team.  Bring any previous mammograms from other facilities or have them mailed to the breast center. Three-dimensional (3D) mammograms are available at [place] locations in [facility], [facility], and [place]. [facility] locations include [place] and [facility] in [place]. Benefits of 3D mammograms include: - Improved rate of cancer detection - Decreases your  "chance of having to go back for more tests, which means fewer: - \"False-positive\" results (This means that there is an abnormal area but it isn't cancer.) - Invasive testing procedures, such as a biopsy or surgery - Can provide clearer images of the breast if you have dense breast tissue. 3D mammography is an optional exam that anyone can have with a 2D mammogram. It doesn't replace or take the place of a 2D mammogram. 2D mammograms remain an effective screening test for all women.  Not all insurance companies cover the cost of a 3D mammogram. Check with your insurance.            Oct 23, 2017 12:40 PM CDT   (Arrive by 12:25 PM)   Return Visit with BROOKE Heredia [facility] ([facility] [place])    [address] [place] [id number] [phone]              Future tests that were ordered for you today     Open Future Orders        Priority Expected Expires Ordered    Mammogram, routine screening Routine  9/25/2018 9/25/2017    Lipid panel reflex to direct LDL - -(Today) Routine 9/25/2017 9/25/2018 9/25/2017    Albumin Random Urine Quantitative with Creat Ratio Routine 9/25/2017 9/25/2018 9/25/2017            Who to contact     Please call your clinic at [phone] to:    Ask questions about your health    Make or cancel appointments    Discuss your medicines    Learn about your test results    Speak to your doctor   If you have compliments or concerns about an experience at your clinic, or if you wish to file a complaint, please contact [facility] Patient Relations at [phone] or email us at [email or web address]         Additional Information About Your Visit        [employer] Information     [employer] is an electronic gateway that provides easy, online access to your medical records. With [employer], you can request a clinic appointment, read your test results, renew a prescription or communicate with " your care team.     To sign up for Therativehart visit the website at [email or web address]   You will be asked to enter the access code listed below, as well as some personal information. Please follow the directions to create your username and password.     Your access code is: [email or web address]  Expires: 2017  6:31 AM     Your access code will  in 90 days. If you need help or a new code, please contact your [facility] or call [phone] for assistance.        Care EveryWhere ID     This is your Care EveryWhere ID. This could be used by other organizations to access your [facility] medical records  [id number]        Your Vitals Were     Pulse Respirations BMI (Body Mass Index)             75 20 30.1 kg/m2          Blood Pressure from Last 3 Encounters:   17 122/72   17 136/78   08/10/17 (!) 163/93    Weight from Last 3 Encounters:   17 77.1 kg (169 lb 14.4 oz)   17 75.4 kg (166 lb 4.8 oz)   08/10/17 76.7 kg (169 lb)              We Performed the Following     C FOOT EXAM  NO CHARGE     FLU VACCINE, INCREASED ANTIGEN, PRESV FREE, AGE 65+ [66126]          Today's Medication Changes          These changes are accurate as of: 17  5:04 PM.  If you have any questions, ask your nurse or doctor.               Start taking these medicines.        Dose/Directions    blood glucose monitoring meter device kit   Used for:  Type 2 diabetes mellitus without complication, with long-term current use of insulin (H)   Started by:  Trinity Choi APRN CNP        Use to test blood sugar 3 times daily.   Quantity:  1 kit   Refills:  0         These medicines have changed or have updated prescriptions.        Dose/Directions    blood glucose monitoring lancets   This may have changed:  additional instructions   Used for:  Type 2 diabetes mellitus without complication, with long-term current use of insulin (H)   Changed by:  Trinity Choi APRN CNP         Please check your glucose three times daily   Quantity:  100 each   Refills:  3       blood glucose monitoring test strip   Commonly known as:  no brand specified   This may have changed:  additional instructions   Used for:  Type 2 diabetes mellitus without complication, with long-term current use of insulin (H)   Changed by:  Trinity Choi APRN CNP        For Accu-Chek Annetta Plus 3 month supply, for testing glucose up to 4 times a day   Quantity:  100 each   Refills:  0            Where to get your medicines      These medications were sent to [id number] - [place], [place] - [address] AT [facility] & [address], [place] [address]     Phone:  [phone]     blood glucose monitoring lancets    blood glucose monitoring meter device kit    blood glucose monitoring test strip    insulin glargine 100 UNIT/ML injection    insulin lispro 100 UNIT/ML injection                Primary Care Provider Office Phone # Fax #    BROOKE Heredia [phone]        [facility] [address]        Equal Access to Services     [facility]: Hadii aad ku hadasho Soomaali, waaxda luqadaha, qaybta kaalmada adeegyada, waxay fariha bowers . So [facility] [phone].    ATENCIÓN: Si habla español, tiene a rene disposición servicios gratuitos de asistencia lingüística. [facility] [phone].    We comply with applicable federal civil rights laws and [place] laws. We do not discriminate on the basis of race, color, national origin, age, disability sex, sexual orientation or gender identity.            Thank you!     Thank you for choosing [facility]  for your care. Our goal is always to provide you with excellent care. Hearing back from our patients is one way we can continue to improve our services. Please take a few minutes to complete the written survey that you may receive in the mail after  your visit with us. Thank you!             Your Updated Medication List - Protect others around you: Learn how to safely use, store and throw away your medicines at www.disposemymeds.org.          This list is accurate as of: 9/25/17  5:04 PM.  Always use your most recent med list.                   Brand Name Dispense Instructions for use Diagnosis    albuterol 108 (90 BASE) MCG/ACT Inhaler    PROAIR HFA/PROVENTIL HFA/VENTOLIN HFA    1 Inhaler    Inhale 2 puffs into the lungs every 6 hours as needed for shortness of breath / dyspnea or wheezing    Allergic rhinitis due to pollen       amLODIPine 10 MG tablet    NORVASC    90 tablet    Take 1 tablet (10 mg) by mouth daily    Benign essential hypertension       blood glucose monitoring lancets     100 each    Please check your glucose three times daily    Type 2 diabetes mellitus without complication, with long-term current use of insulin (H)       blood glucose monitoring meter device kit     1 kit    Use to test blood sugar 3 times daily.    Type 2 diabetes mellitus without complication, with long-term current use of insulin (H)       blood glucose monitoring test strip    no brand specified    100 each    For Accu-Chek Annetta Plus 3 month supply, for testing glucose up to 4 times a day    Type 2 diabetes mellitus without complication, with long-term current use of insulin (H)       hydrochlorothiazide 25 MG tablet    HYDRODIURIL    180 tablet    Take 2 tablets (50 mg) by mouth daily    Benign essential hypertension       insulin glargine 100 UNIT/ML injection    LANTUS SOLOSTAR    18 mL    Inject 18 Units Subcutaneous At Bedtime 18 units at bedtime.    Type 2 diabetes mellitus without complication, with long-term current use of insulin (H)       insulin lispro 100 UNIT/ML injection    HumaLOG KWIKpen    3 mL    Inject 10 Units Subcutaneous 3 times daily (before meals) Use 6 units with breakfast, 10 units with lunch and 8 units with dinner    Type 2 diabetes  mellitus without complication, with long-term current use of insulin (H)       lisinopril 40 MG tablet    PRINIVIL/ZESTRIL    90 tablet    Take 1 tablet (40 mg) by mouth At Bedtime    Benign essential hypertension       metFORMIN 500 MG 24 hr tablet    GLUCOPHAGE-XR    60 tablet    Take 1 tablet (500 mg) by mouth 2 times daily (with meals)    Type 2 diabetes mellitus without complication, with long-term current use of insulin (H)       simvastatin 40 MG tablet    ZOCOR    90 tablet    Take 1 tablet (40 mg) by mouth At Bedtime    Mixed hyperlipidemia

## 2017-09-25 NOTE — PROGRESS NOTES
SUBJECTIVE:  Bettina Guerrero is a 67 year old female with pmh of   Patient Active Problem List   Diagnosis     Diabetes mellitus (H)     Hypertension     Asthma in adult     Excessive daytime sleepiness     Snoring     Hyperlipidemia LDL goal <100     Intraductal papilloma of right breast     who comes in for preventive care examination today.   She has the following concerns    Diabetes--If takes 1 tablet of Metformin, is fine. If takes a 2nd one, will have miserable watery diarrhea. Has been only taking 500 mg metformin because the diarrhea is intolerable. Has taken Kaopectate for diarrhea, helps somewhat.   --Has not restarted insulin. Was using Novolog with SS. 8 units and then adjusted based on blood sugars. Has not started checking her blood sugars. Lost her glucometer. Was referred to diabetic educator at last visit but did not go.  --She is retiring in 4 days.  wants her to switch to [employer] insurance, or maybe [employer].    Asthma--going well, no problems breathing.        Menses:  No LMP recorded. Patient is postmenopausal.  Menstrual cycles are absent.    PAP HX:   Last No results found for: PAP  History of abnormal None    Breast:  Patient performs self breast exams  Yes   Breast concerns Yes --R breast papilloma removed last year  Ma Breast Specimen Right    Result Date: 6/22/2016  Narrative: Exam: Ultrasound guided wire localization of the right breast, post localization mammography and specimen radiography. Indication: Nonpalpable right breast papilloma. Procedure: Procedure, risks, benefits and alternatives were discussed with the patient and the patient gave written and verbal consent. Aseptic technique was utilized. Approximately 2 cc of 1% lidocaine were utilized for local anesthesia. With ultrasound guidance, 1 [place] A wire was utilized to localize clip and prominent duct at the 8:00 position, subareolar right breast. Wire was loosely secured in place. Mammogram was performed. A  dressing was placed. Films were marked and made available for Dr. Nino in the operating room. Specimen radiograph following lumpectomy was performed which demonstrates the biopsy marking clip in the midportion of the biopsy specimen. Ultrasound-guided right breast wire localization and post lumpectomy specimen radiograph without apparent complication. KAIT WELLS Ma Post Procedure Right    Result Date: 6/22/2016  Narrative: Exam: Ultrasound guided wire localization of the right breast, post localization mammography and specimen radiography. Indication: Nonpalpable right breast papilloma. Procedure: Procedure, risks, benefits and alternatives were discussed with the patient and the patient gave written and verbal consent. Aseptic technique was utilized. Approximately 2 cc of 1% lidocaine were utilized for local anesthesia. With ultrasound guidance, 1 [place] A wire was utilized to localize clip and prominent duct at the 8:00 position, subareolar right breast. Wire was loosely secured in place. Mammogram was performed. A dressing was placed. Films were marked and made available for Dr. Nino in the operating room. Specimen radiograph following lumpectomy was performed which demonstrates the biopsy marking clip in the midportion of the biopsy specimen. Ultrasound-guided right breast wire localization and post lumpectomy specimen radiograph without apparent complication. KAIT WELLS Ma Post Procedure Right    Addendum Date: 5/13/2016    Addendum: Histology of fragments of sclerosed intraductal papilloma concordant with imaging findings. Recommendation: Surgical consultation. SHAYNE RG MD    Result Date: 5/13/2016  Narrative: Exam: Ultrasound-guided core needle biopsy right breast Procedure, risks, alternatives, explained and discussed with patient. Signed consent obtained. Using ultrasound guidance, aseptic technique, local anesthesia, 14-gauge automated core biopsy needle through a 13-gauge cannula, suspicious  "mass on the right was sampled. After the first core, the mass was no longer visible. A BiomarC (shaped liked a \"barbell\") clip was placed. Pressure was held the biopsy sites for 10 minutes following biopsy. Postbiopsy mammogram confirms marker deployment. The area was bandaged. Discharge instructions were given to the patient. There are no apparent complications. SHAYNE RG MD    Us Breast Right    Addendum Date: 4/29/2016    Addendum: Comparison mammograms dated 2/18/14 and 4/5/2013 have been made available. There is no significant mammographic change. Given ultrasound findings and bloody nipple discharge symptoms, the impression and recommendation are unchanged. Impression: BI-RADS category: 4, suspicious abnormality, biopsy should be considered. Recommendation: Biopsy Ultrasound-guided core needle biopsy of the right breast. I have personally reviewed the examination and initial interpretation and I agree with the findings. SHAYNE RG MD    Addendum Date: 4/29/2016    Addendum: Comparison mammograms dated 2/18/14 and 4/5/2013 have been made available. There is no significant mammographic change. Given ultrasound findings and bloody nipple discharge symptoms, the impression and recommendation are unchanged. Impression: BI-RADS category: 4, suspicious abnormality, biopsy should be considered. Recommendation: Biopsy Ultrasound-guided core needle biopsy of the right breast. I have personally reviewed the examination and initial interpretation and I agree with the findings. SHAYNE RG MD    Result Date: 5/2/2016  Narrative: Examination: Bilateral digital diagnostic mammography with computer aided detection , tomosynthesis and right breast ultrasound Comparison: None History: 1 episode of profuse spontaneous bloody nipple discharge right breast. Multiple relatives with a history of breast cancer. BREAST DENSITY: Scattered fibroglandular densities Findings: No suspicious mammographic finding is identified. Targeted " right breast ultrasound was performed demonstrating branching intraductal soft tissue in the lateral retroareolar right breast, involving at least 2 cm of the ducts. No internal blood flow is demonstrated. Both the technologist and radiologist scanned the patient with ultrasound. No discharge could be elicited on physical exam.     Us Breast Wire Placement Right    Result Date: 6/22/2016  Narrative: Exam: Ultrasound guided wire localization of the right breast, post localization mammography and specimen radiography. Indication: Nonpalpable right breast papilloma. Procedure: Procedure, risks, benefits and alternatives were discussed with the patient and the patient gave written and verbal consent. Aseptic technique was utilized. Approximately 2 cc of 1% lidocaine were utilized for local anesthesia. With ultrasound guidance, 1 [place] A wire was utilized to localize clip and prominent duct at the 8:00 position, subareolar right breast. Wire was loosely secured in place. Mammogram was performed. A dressing was placed. Films were marked and made available for Dr. Nino in the operating room. Specimen radiograph following lumpectomy was performed which demonstrates the biopsy marking clip in the midportion of the biopsy specimen. Ultrasound-guided right breast wire localization and post lumpectomy specimen radiograph without apparent complication. KAIT WELLS    Us Breast Biopsy Core Needle Right    Addendum Date: 5/13/2016    Addendum: Histology of fragments of sclerosed intraductal papilloma concordant with imaging findings. Recommendation: Surgical consultation. SHAYNE RG MD    Result Date: 5/13/2016  Narrative: Exam: Ultrasound-guided core needle biopsy right breast Procedure, risks, alternatives, explained and discussed with patient. Signed consent obtained. Using ultrasound guidance, aseptic technique, local anesthesia, 14-gauge automated core biopsy needle through a 13-gauge cannula, suspicious mass on the right  "was sampled. After the first core, the mass was no longer visible. A BiomarC (shaped liked a \"barbell\") clip was placed. Pressure was held the biopsy sites for 10 minutes following biopsy. Postbiopsy mammogram confirms marker deployment. The area was bandaged. Discharge instructions were given to the patient. There are no apparent complications. SHAYNE RG MD      Sexual Hx:  Sexually active  yes, single partner, contraception - not needed  Partner(s) are  male   IS NOT interested in STD testing    Pregnancy:   G  4 and P  3      Medications and allergies reviewed by me today.     Family History   Problem Relation Age of Onset     Esophageal Cancer Mother      Lung Cancer Father      Liver Cancer Father      Lymphoma Daughter      Bone Cancer Maternal Aunt      Leukemia Maternal Aunt        Social History   Substance Use Topics     Smoking status: Never Smoker     Smokeless tobacco: Never Used     Alcohol use No       Immunization History   Administered Date(s) Administered     Influenza (High Dose) 3 valent vaccine 09/25/2017     Influenza (IIV3) 10/01/2014     Pneumococcal (PCV 13) 04/27/2016     Pneumococcal 23 valent 02/28/2006, 08/17/2017     TDAP Vaccine (Boostrix) 04/27/2016     Tdap (Adacel,Boostrix) 02/28/2006         Review Of Systems  Constitutional: no fevers, chills, night sweats or unintentional weight change   Eyes: no vision change, diplopia or red eyes   Ears, Nose, Mouth, Throat: no tinnitus or hearing change, no epistaxis or nasal discharge, no oral lesions, throat clear   Cardiovascular: no chest pain, palpitations, or pain with walking, no orthopnea or PND   Respiratory: no dyspnea, cough, shortness of breath or wheezing   GI: no nausea, vomiting, diarrhea or constipation, no abdominal pain   : no change in urine, no dysuria or hematuria, no sexual dysfunction   Musculoskeletal: no joint or muscle pain or swelling   Integumentary: no concerning lesions or moles   Neuro: no loss of strength or " sensation, no numbness or tingling, no tremor, no dizziness, no headache   Psych: no depression or anxiety, no sleep problems      OBJECTIVE:    /72 (BP Location: Right arm, Patient Position: Chair, Cuff Size: Adult Large)  Pulse 75  Resp 20  Wt 77.1 kg (169 lb 14.4 oz)  BMI 30.1 kg/m2   Wt Readings from Last 1 Encounters:   09/25/17 77.1 kg (169 lb 14.4 oz)       Constitutional: no distress, comfortable, pleasant   Eyes: anicteric, normal extra-ocular movements   Ears, Nose and Throat: tympanic membranes clear, nose clear and free of lesions, throat clear, neck supple with full range of motion, no thyromegaly.   Cardiovascular: regular rate and rhythm, normal S1 and S2, no murmurs, rubs or gallops, peripheral pulses full and symmetric   Respiratory: clear to auscultation, no wheezes or crackles, normal breath sounds   Gastrointestinal: positive bowel sounds, nontender, no hepatosplenomegaly, no masses   Gentiourinary: deferred  Musculoskeletal: full range of motion, no edema   Skin: no concerning lesions, no jaundice   Breast: no lumps or mass, 2-cm scar above R nipple around at areola, no nipple discharge  Neurological: cranial nerves intact, normal strength and sensation, reflexes at patella 2+, normal gait, no tremor   Psychological: appropriate mood   Lymphatic: no axillary, cervical, or clavicular lymphadenopathy  Foot Exam:  normal DP and PT pulses, no trophic changes or ulcerative lesions, normal sensory exam and normal monofilament exam      ASSESSMENT/PLAN:  Pt is a 67 year old female here for preventive examination    1. Routine history and physical examination of adult    2. At risk for falling    3. Need for prophylactic vaccination and inoculation against influenza    4. Screening for diabetic peripheral neuropathy    5. Type 2 diabetes mellitus without complication, with long-term current use of insulin (H)    6. Screening for hyperlipidemia    7. Encounter for screening mammogram for breast  cancer      Refilled insulin, reviewed importance of use and checking blood glucose. Last A1C 12.8, discussed end-organ damage with poorly controlled diabetes.  Reviewed s/s of hypoglycemia and appropriate intervention if this occurs. See pt instructions.  Due for routine health maintenance--mammogram, lipid panel, foot exam.    FOLLOW UP: in 1 month to review diabetes control. Discussed if insurance changes and need to switch clinics, establish with new PCP within 1 month.    GYN TESTING:  Breast examination performed.Yes   Pelvic examination performed No   Pap   No If normal PAP results no paps needed.  STD testing No     PREVENTATIVE TESTING:  Breast cancer screening  indicated and Ordered  Colorectal screening not indicated  Previously done--Due 5/2026  Osteoporosis screening not indicated.  Previously done (4/27/2016). Recommend that patient take 1200 mg calcium in divided doses and 1000 IU of vitamin D on daily basis.   Immunizations reviewed    Labs ordered Lipid Panel and Urine microalbumin/creatinine  Counseling was provided in the following areas:  regular exercise  weight management  healthy diet/nutrition  osteoporosis prevention/bone health  self breast exam     BROOKE Sarabia CNP

## 2017-09-28 ENCOUNTER — TELEPHONE (OUTPATIENT)
Dept: INTERNAL MEDICINE | Facility: CLINIC | Age: 67
End: 2017-09-28

## 2017-09-28 DIAGNOSIS — E78.5 HYPERLIPIDEMIA LDL GOAL <100: Primary | ICD-10-CM

## 2017-09-28 RX ORDER — ATORVASTATIN CALCIUM 40 MG/1
40 TABLET, FILM COATED ORAL DAILY
Qty: 30 TABLET | Refills: 2 | Status: SHIPPED | OUTPATIENT
Start: 2017-09-28 | End: 2018-06-08

## 2017-09-28 NOTE — TELEPHONE ENCOUNTER
I spoke to Bettina today regarding test results. Informed her of need to monitor blood sugar, take insulin, and take metformin as urine showed early kidney damage. She voiced understanding. Also informed her elevated cholesterol. She confirmed that she has been taking her simvastatin daily for years. I informed that [facility] would like to change her medication to atorvastatin instead, rx sent to pharmacy. Informed Bettina that she should either follow up here in one month, or follow up with another primary if her new insurance (pt is retiring) no longer covers this clinic. Bettina stated she would follow up in a month. Bettina also requested a letter of results be sent to her for reference. I will have results letter sent out.    Rozina Gil RN

## 2018-04-18 ENCOUNTER — OFFICE VISIT (OUTPATIENT)
Dept: ORTHOPEDICS | Facility: CLINIC | Age: 68
End: 2018-04-18
Payer: COMMERCIAL

## 2018-04-18 ENCOUNTER — RADIANT APPOINTMENT (OUTPATIENT)
Dept: GENERAL RADIOLOGY | Facility: CLINIC | Age: 68
End: 2018-04-18
Attending: FAMILY MEDICINE
Payer: COMMERCIAL

## 2018-04-18 VITALS — SYSTOLIC BLOOD PRESSURE: 154 MMHG | DIASTOLIC BLOOD PRESSURE: 95 MMHG | HEART RATE: 90 BPM

## 2018-04-18 DIAGNOSIS — M25.512 PAIN OF LEFT SHOULDER REGION: Primary | ICD-10-CM

## 2018-04-18 DIAGNOSIS — S29.012A STRAIN OF RHOMBOID MUSCLE, INITIAL ENCOUNTER: ICD-10-CM

## 2018-04-18 DIAGNOSIS — M25.512 PAIN OF LEFT SHOULDER REGION: ICD-10-CM

## 2018-04-18 DIAGNOSIS — M75.82 ROTATOR CUFF TENDINITIS, LEFT: ICD-10-CM

## 2018-04-18 NOTE — MR AVS SNAPSHOT
After Visit Summary   4/18/2018    Bettina Guerrero    MRN: [id number]           Patient Information     Date Of Birth          1950        Visit Information        Provider Department      4/18/2018 3:10 PM Jae Hurtado [facility]        Today's Diagnoses     Pain of left shoulder region    -  1    Strain of rhomboid muscle, initial encounter        Rotator cuff tendinitis, left          Care Instructions    Rhomboid Strain    WHAT IS A RHOMBOID STRAIN OR SPASM?    The rhomboid muscles in your upper back connect the inner edges of your shoulder blades to your spine. A rhomboid strain is a stretch or tear of these muscles. A rhomboid spasm is a sudden tightening of the muscle that you cannot control.    WHAT IS THE CAUSE?    A rhomboid muscle strain or spasm is usually caused by overuse of your shoulder and arm. This can happen from:    Overhead activities, like serving a tennis ball or reaching to put objects on a high shelf  Rowing  Carrying a heavy backpack, especially if you carry it over just one shoulder  Poor posture, especially while you are using a computer for a long time  WHAT ARE THE SYMPTOMS?    A strain causes pain in the upper back between your shoulder blade and your spine. A spasm feels like a knot or tightness in the muscle. You may have pain when you move your shoulders or when you breathe.    HOW IS IT DIAGNOSED?    Your healthcare provider will ask about your symptoms, activities, and medical history and examine you.    HOW IS IT TREATED?    You will need to change or stop doing the activities that cause pain until your muscles have healed. For example, you may need to run or ride a bicycle instead of playing tennis or rowing.    Your healthcare provider may recommend stretching and strengthening exercises and other types of physical therapy to help you heal.    A mild rhomboid strain may heal within a few weeks, but a severe  injury may take 6 weeks or longer.    HOW CAN I HELP TAKE CARE OF MYSELF?    To help relieve swelling and pain:    Put an ice pack, gel pack, or package of frozen vegetables wrapped in a cloth on the injured area every 3 to 4 hours for up to 20 minutes at a time. You can lie down with your upper back against the ice.  Take pain medicine, such as acetaminophen, ibuprofen, or other medicine as directed by your provider. Nonsteroidal anti-inflammatory medicines (NSAIDs), such as ibuprofen, may cause stomach bleeding and other problems. These risks increase with age. Read the label and take as directed. Unless recommended by your healthcare provider, do not take for more than 10 days.    Put moist heat on your back for up to 20 minutes at a time to help relax tight muscles or muscle spasms. Moist heat includes heat patches or moist heating pads that you can purchase at most drugstores, a wet washcloth or towel that has been heated in the dryer, or a hot shower. Don t use heat if you have swelling.    Do the exercises recommended by your healthcare provider. Massage is also very helpful. Here s a way to do a form of self-massage:    Put a tennis ball on the floor and lie down with your upper back against the ball.  Shift your position to gently roll the ball against your muscles.  You can also buy a foam roller or a self-massage tool.    Follow your healthcare provider's instructions. Ask your provider:    How and when you will hear your test results  How long it will take to recover  What activities you should avoid and when you can return to your normal activities  How to take care of yourself at home  What symptoms or problems you should watch for and what to do if you have them  Make sure you know when you should come back for a checkup.    HOW CAN I HELP PREVENT RHOMBOID MUSCLE STRAIN OR SPASM?    Here are some of the things you can do to help prevent rhomboid muscle strain or spasm:    Do warm-up exercises and  stretching before activities to help prevent injuries.  When you work at a computer, take frequent breaks to stretch your neck and back.  Follow safety rules and use any protective equipment recommended for your work or sport. Practice good posture and good form!    Developed by [employer].  Published by [employer].  Copyright  2014 [employer] and/or one of its subsidiaries. All rights reserved.                            Rhomboid Strain Exercises    You may do all of these exercises right away.    Pectoralis stretch:  an open doorway or corner with both hands slightly above your head on the door frame or wall. Slowly lean forward until you feel a stretch in the front of your shoulders. Hold 15 to 30 seconds. Repeat 3 times.  Thoracic extension: Sit in a chair and clasp both arms behind your head. Gently arch backward and look up toward the ceiling. Repeat 10 times. Do this several times each day.    Arm slide on wall: Sit or stand with your back against a wall and your elbows and wrists against the wall. Slowly slide your arms upward as high as you can while keeping your elbows and wrists against the wall. Do 2 sets of 8 to 12.  Scapular squeeze: While sitting or standing with your arms by your sides, squeeze your shoulder blades together and hold for 5 seconds. Do 2 sets of 15.    Mid-trap exercise: Lie on your stomach on a firm surface and place a folded pillow underneath your chest. Place your arms out straight to your sides with your elbows straight and thumbs toward the ceiling. Slowly raise your arms toward the ceiling as you squeeze your shoulder blades together. Lower slowly. Do 3 sets of 15. As the exercise gets easier to do, hold soup cans or small weights in your hands.    Thoracic stretch: Sit on the floor with your legs out straight in front of you. Hold your mid-thighs with your hands. Curl you head and neck toward your belly button. Hold for a count of 15. Repeat 3  times.    Thoracic side stretch: Sit on the floor with your legs out straight in front of you. To stretch your right upper back, point your right elbow and shoulder forward while twisting your trunk to the left. Hold for a count of 15. Repeat 3 times. To stretch your left upper back, point your left elbow and shoulder forward while twisting your trunk to the right. Hold for a count of 15. Repeat 3 times.    Rowing exercise: Close middle of elastic tubing in a door or wrap tubing around an immovable object. Hold 1 end in each hand. Sit in a chair, bend your arms 90 degrees, and hold one end of the tubing in each hand. Keep your forearms vertical and your elbows at shoulder level and bent 90 degrees. Pull backward on the band and squeeze your shoulder blades together. Do 2 sets of 15.                  Follow-ups after your visit        Additional Services     PHYSICAL THERAPY REFERRAL (Internal)       Physical Therapy Referral                  Who to contact     Please call your clinic at [phone] to:    Ask questions about your health    Make or cancel appointments    Discuss your medicines    Learn about your test results    Speak to your doctor            Additional Information About Your Visit        whoplusyou Information     whoplusyou is an electronic gateway that provides easy, online access to your medical records. With whoplusyou, you can request a clinic appointment, read your test results, renew a prescription or communicate with your care team.     To sign up for [employer]oplusyou visit the website at [email or web address]   You will be asked to enter the access code listed below, as well as some personal information. Please follow the directions to create your username and password.     Your access code is: [email or web address]  Expires: 2018  3:07 PM     Your access code will  in 90 days. If you need help or a new code, please contact your [facility] or call [phone] for  assistance.        Care EveryWhere ID     This is your Care EveryWhere ID. This could be used by other organizations to access your [facility] medical records  [id number]        Your Vitals Were     Pulse                   90            Blood Pressure from Last 3 Encounters:   04/18/18 (!) 154/95   09/25/17 122/72   08/17/17 136/78    Weight from Last 3 Encounters:   09/25/17 77.1 kg (169 lb 14.4 oz)   08/17/17 75.4 kg (166 lb 4.8 oz)   08/10/17 76.7 kg (169 lb)              We Performed the Following     PHYSICAL THERAPY REFERRAL (Internal)          Today's Medication Changes          These changes are accurate as of 4/18/18  4:08 PM.  If you have any questions, ask your nurse or doctor.               Start taking these medicines.        Dose/Directions    tiZANidine 4 MG tablet   Commonly known as:  ZANAFLEX   Used for:  Pain of left shoulder region, Strain of rhomboid muscle, initial encounter, Rotator cuff tendinitis, left   Started by:  Jae Hurtado DO        Dose:  4 mg   Take 1 tablet (4 mg) by mouth 3 times daily   Quantity:  20 tablet   Refills:  0            Where to get your medicines      These medications were sent to [id number] - [place], [place] - [address] AT [facility] & [address], [place] [address]     Phone:  [phone]     tiZANidine 4 MG tablet                Primary Care Provider Office Phone # Fax #    Trinity Arcelia Choi, APRN [facility] [phone]       [address] [place] [id number]        Equal Access to Services     [facility]: Hadii reginaldo lee hadasho Soomaali, waaxda luqadaha, qaybta kaalmada adeegyamaria t, dany bowers . So [facility] [phone].    ATENCIÓN: Si habla español, tiene a rene disposición servicios gratuitos de asistencia lingüística. Llame al [phone].    We comply with applicable federal civil rights laws and [place] laws. We do not discriminate on the basis of  race, color, national origin, age, disability, sex, sexual orientation, or gender identity.            Thank you!     Thank you for choosing [facility]  for your care. Our goal is always to provide you with excellent care. Hearing back from our patients is one way we can continue to improve our services. Please take a few minutes to complete the written survey that you may receive in the mail after your visit with us. Thank you!             Your Updated Medication List - Protect others around you: Learn how to safely use, store and throw away your medicines at www.disposemymeds.org.          This list is accurate as of 4/18/18  4:08 PM.  Always use your most recent med list.                   Brand Name Dispense Instructions for use Diagnosis    albuterol 108 (90 Base) MCG/ACT Inhaler    PROAIR HFA/PROVENTIL HFA/VENTOLIN HFA    1 Inhaler    Inhale 2 puffs into the lungs every 6 hours as needed for shortness of breath / dyspnea or wheezing    Allergic rhinitis due to pollen       amLODIPine 10 MG tablet    NORVASC    90 tablet    Take 1 tablet (10 mg) by mouth daily    Benign essential hypertension       atorvastatin 40 MG tablet    LIPITOR    30 tablet    Take 1 tablet (40 mg) by mouth daily    Hyperlipidemia LDL goal <100       blood glucose monitoring lancets     100 each    Please check your glucose three times daily    Type 2 diabetes mellitus without complication, with long-term current use of insulin (H)       blood glucose monitoring meter device kit     1 kit    Use to test blood sugar 3 times daily.    Type 2 diabetes mellitus without complication, with long-term current use of insulin (H)       blood glucose monitoring test strip    no brand specified    100 each    For Accu-Chek Annetta Plus 3 month supply, for testing glucose up to 4 times a day    Type 2 diabetes mellitus without complication, with long-term current use of insulin (H)       hydrochlorothiazide 25 MG  tablet    HYDRODIURIL    180 tablet    Take 2 tablets (50 mg) by mouth daily    Benign essential hypertension       insulin glargine 100 UNIT/ML injection    LANTUS SOLOSTAR    18 mL    Inject 18 Units Subcutaneous At Bedtime 18 units at bedtime.    Type 2 diabetes mellitus without complication, with long-term current use of insulin (H)       insulin lispro 100 UNIT/ML injection    HumaLOG KWIKpen    3 mL    Inject 10 Units Subcutaneous 3 times daily (before meals) Use 6 units with breakfast, 10 units with lunch and 8 units with dinner    Type 2 diabetes mellitus without complication, with long-term current use of insulin (H)       lisinopril 40 MG tablet    PRINIVIL/ZESTRIL    90 tablet    Take 1 tablet (40 mg) by mouth At Bedtime    Benign essential hypertension       metFORMIN 500 MG 24 hr tablet    GLUCOPHAGE-XR    60 tablet    Take 1 tablet (500 mg) by mouth 2 times daily (with meals)    Type 2 diabetes mellitus without complication, with long-term current use of insulin (H)       tiZANidine 4 MG tablet    ZANAFLEX    20 tablet    Take 1 tablet (4 mg) by mouth 3 times daily    Pain of left shoulder region, Strain of rhomboid muscle, initial encounter, Rotator cuff tendinitis, left

## 2018-04-18 NOTE — PROGRESS NOTES
"CHIEF COMPLAINT:  Consult (L sided periscapula pain)       HISTORY OF PRESENT ILLNESS  Ms. Guerrero is a pleasant 68 year old year old female who presents to clinic today with left shoulder and back pain.  Bettina explains that she began to experience pain initially after fall on the ice 3 weeks ago.  She fell onto her back.  Mild discomfort of her left posterior shoulder and back at this time.  More recently she recalls helping her  lift a garage door (motor not working)  and experiencing a twinge of pain in the periscapular region of her left shoulder.  Subsequently has had to lift a garage door multiple times, incurring pain with each event.  Over the past week she has had more significant pain which is causing her to lose sleep.  Pain is localized to the left periscapular region mostly.  She has decreased range of motion of her left shoulder and unable to \"put on her bra\".  She has tried heat for treatment.    Additional history: as documented    MEDICAL HISTORY  Patient Active Problem List   Diagnosis     Diabetes mellitus (H)     Hypertension     Asthma in adult     Excessive daytime sleepiness     Snoring     Hyperlipidemia LDL goal <100     Intraductal papilloma of right breast       NOT TAKING ANY MEDS LISTED - Changed insurance and waiting for insurance co to assign provider.    Current Outpatient Prescriptions   Medication     tiZANidine (ZANAFLEX) 4 MG tablet     albuterol (PROAIR HFA, PROVENTIL HFA, VENTOLIN HFA) 108 (90 BASE) MCG/ACT inhaler     amLODIPine (NORVASC) 10 MG tablet     atorvastatin (LIPITOR) 40 MG tablet     blood glucose monitoring (ACCU-CHEK GREGORIO PLUS) meter device kit     blood glucose monitoring (ACCU-CHEK MULTICLIX) lancets     blood glucose monitoring (NO BRAND SPECIFIED) test strip     hydrochlorothiazide (HYDRODIURIL) 25 MG tablet     insulin glargine (LANTUS SOLOSTAR) 100 UNIT/ML injection     insulin lispro (HUMALOG KWIKPEN) 100 UNIT/ML injection     lisinopril " (PRINIVIL/ZESTRIL) 40 MG tablet     metFORMIN (GLUCOPHAGE-XR) 500 MG 24 hr tablet     No current facility-administered medications for this visit.          Allergies   Allergen Reactions     Seasonal Allergies      Congesion, pain in sinues, lots of sputum       Family History   Problem Relation Age of Onset     Esophageal Cancer Mother      Lung Cancer Father      Liver Cancer Father      Lymphoma Daughter      Bone Cancer Maternal Aunt      Leukemia Maternal Aunt      Colonoscopy 5/2016    Additional medical/Social/Surgical histories reviewed in [facility] and updated as appropriate.     REVIEW OF SYSTEMS (4/18/2018)  CONSTITUTIONAL: Denies fever and weight loss  EYES: Denies acute vision changes  ENT: Denies hearing changes or difficulty swallowing  CARDIAC: Denies chest pain or edema  RESPIRATORY: Denies dyspnea, cough or wheeze  GASTROINTESTINAL: Denies abdominal pain, nausea, vomiting, endorses episodes of black stools  MUSCULOSKELETAL: See HPI  SKIN: Denies any recent rash or lesion  NEUROLOGICAL: Denies numbness or focal weakness  PSYCHIATRIC: No history of psychiatric symptoms or problems  ENDOCRINE: Diagnosis of diabetes: Uncontrolled diabetes type 2, not taking meds  HEMATOLOGY: Denies episodes of easy bleeding      PHYSICAL EXAM  BP (!) 154/95  Pulse 90    General  - normal appearance, in no obvious distress  CV  - normal radial pulse  Pulm  - normal respiratory pattern, non-labored  Musculoskeletal - left shoulder  - inspection: normal bone and joint alignment, no obvious deformity, no scapular winging, no AC step-off  - palpation: no bony tenderness, normal clavicle, non-tender AC.  No tenderness at rotator cuff insertion and greater tuberosity.  Exquisite tenderness to palpation and periscapular region along rhomboideus major and rhomboid is minor.  Additional tenderness palpation at levator scapula.  - ROM:  120 deg flexion   45 deg extension   150 deg abduction   60 deg ER    IR to L1 vs. T10  contralat  - strength: 5/5  strength, 5/5 in all shoulder planes  - special tests:  (-) Speed's  (-) Neer  (+) Hawkin's  (-) Alber  (-) Yukon's  (-) apprehension  (-) subscap lift-off  Neuro  - no sensory or motor deficit, grossly normal coordination, normal muscle tone  Skin  - no ecchymosis, erythema, warmth, or induration, no obvious rash  Psych  - interactive, appropriate, normal mood and affect    IMAGING : XR Shoulder 4V. Final results and radiologist's interpretation, available in the [facility] health record. Images were reviewed with the patient/family members in the office today. My personal interpretation of the performed imaging is no acute osseous abnormality.  Calcific tendonitis.     ASSESSMENT & PLAN  Ms. Guerrero is a 68 year old year old female who presents to clinic today with left-sided periscapular pain with onset after a fall onto her back, as well as manually lifting heavy garage door almost daily for the past couple weeks.    Diagnosis:   Left-sided rhomboid strain  Rotator cuff calcific tendonitis left    -Heat, stretching/home exercise program  -Tizanidine 4mg up to TID, best QHS   -Tylenol discussed; avoid ibuprofen until dark stools are tested  -Referral for PT if no improvement in 1 week with home exercise program  -PCP referral - [facility] for DM2, possible melanotic stools  -Follow up 1 month    It was a pleasure seeing Bettina today.    Jae Hurtado DO, CAQSM  Primary Care Sports Medicine

## 2018-04-18 NOTE — PATIENT INSTRUCTIONS
Rhomboid Strain    WHAT IS A RHOMBOID STRAIN OR SPASM?    The rhomboid muscles in your upper back connect the inner edges of your shoulder blades to your spine. A rhomboid strain is a stretch or tear of these muscles. A rhomboid spasm is a sudden tightening of the muscle that you cannot control.    WHAT IS THE CAUSE?    A rhomboid muscle strain or spasm is usually caused by overuse of your shoulder and arm. This can happen from:    Overhead activities, like serving a tennis ball or reaching to put objects on a high shelf  Rowing  Carrying a heavy backpack, especially if you carry it over just one shoulder  Poor posture, especially while you are using a computer for a long time  WHAT ARE THE SYMPTOMS?    A strain causes pain in the upper back between your shoulder blade and your spine. A spasm feels like a knot or tightness in the muscle. You may have pain when you move your shoulders or when you breathe.    HOW IS IT DIAGNOSED?    Your healthcare provider will ask about your symptoms, activities, and medical history and examine you.    HOW IS IT TREATED?    You will need to change or stop doing the activities that cause pain until your muscles have healed. For example, you may need to run or ride a bicycle instead of playing tennis or rowing.    Your healthcare provider may recommend stretching and strengthening exercises and other types of physical therapy to help you heal.    A mild rhomboid strain may heal within a few weeks, but a severe injury may take 6 weeks or longer.    HOW CAN I HELP TAKE CARE OF MYSELF?    To help relieve swelling and pain:    Put an ice pack, gel pack, or package of frozen vegetables wrapped in a cloth on the injured area every 3 to 4 hours for up to 20 minutes at a time. You can lie down with your upper back against the ice.  Take pain medicine, such as acetaminophen, ibuprofen, or other medicine as directed by your provider. Nonsteroidal anti-inflammatory medicines (NSAIDs), such as  ibuprofen, may cause stomach bleeding and other problems. These risks increase with age. Read the label and take as directed. Unless recommended by your healthcare provider, do not take for more than 10 days.    Put moist heat on your back for up to 20 minutes at a time to help relax tight muscles or muscle spasms. Moist heat includes heat patches or moist heating pads that you can purchase at most drugstores, a wet washcloth or towel that has been heated in the dryer, or a hot shower. Don t use heat if you have swelling.    Do the exercises recommended by your healthcare provider. Massage is also very helpful. Here s a way to do a form of self-massage:    Put a tennis ball on the floor and lie down with your upper back against the ball.  Shift your position to gently roll the ball against your muscles.  You can also buy a foam roller or a self-massage tool.    Follow your healthcare provider's instructions. Ask your provider:    How and when you will hear your test results  How long it will take to recover  What activities you should avoid and when you can return to your normal activities  How to take care of yourself at home  What symptoms or problems you should watch for and what to do if you have them  Make sure you know when you should come back for a checkup.    HOW CAN I HELP PREVENT RHOMBOID MUSCLE STRAIN OR SPASM?    Here are some of the things you can do to help prevent rhomboid muscle strain or spasm:    Do warm-up exercises and stretching before activities to help prevent injuries.  When you work at a computer, take frequent breaks to stretch your neck and back.  Follow safety rules and use any protective equipment recommended for your work or sport. Practice good posture and good form!    Developed by [employer].  Published by [employer].  Copyright  2014 [employer] and/or one of its subsidiaries. All rights reserved.                            Rhomboid Strain Exercises    You may do all of  these exercises right away.    Pectoralis stretch:  an open doorway or corner with both hands slightly above your head on the door frame or wall. Slowly lean forward until you feel a stretch in the front of your shoulders. Hold 15 to 30 seconds. Repeat 3 times.  Thoracic extension: Sit in a chair and clasp both arms behind your head. Gently arch backward and look up toward the ceiling. Repeat 10 times. Do this several times each day.    Arm slide on wall: Sit or stand with your back against a wall and your elbows and wrists against the wall. Slowly slide your arms upward as high as you can while keeping your elbows and wrists against the wall. Do 2 sets of 8 to 12.  Scapular squeeze: While sitting or standing with your arms by your sides, squeeze your shoulder blades together and hold for 5 seconds. Do 2 sets of 15.    Mid-trap exercise: Lie on your stomach on a firm surface and place a folded pillow underneath your chest. Place your arms out straight to your sides with your elbows straight and thumbs toward the ceiling. Slowly raise your arms toward the ceiling as you squeeze your shoulder blades together. Lower slowly. Do 3 sets of 15. As the exercise gets easier to do, hold soup cans or small weights in your hands.    Thoracic stretch: Sit on the floor with your legs out straight in front of you. Hold your mid-thighs with your hands. Curl you head and neck toward your belly button. Hold for a count of 15. Repeat 3 times.    Thoracic side stretch: Sit on the floor with your legs out straight in front of you. To stretch your right upper back, point your right elbow and shoulder forward while twisting your trunk to the left. Hold for a count of 15. Repeat 3 times. To stretch your left upper back, point your left elbow and shoulder forward while twisting your trunk to the right. Hold for a count of 15. Repeat 3 times.    Rowing exercise: Close middle of elastic tubing in a door or wrap tubing around an  immovable object. Hold 1 end in each hand. Sit in a chair, bend your arms 90 degrees, and hold one end of the tubing in each hand. Keep your forearms vertical and your elbows at shoulder level and bent 90 degrees. Pull backward on the band and squeeze your shoulder blades together. Do 2 sets of 15.

## 2018-04-18 NOTE — LETTER
4/18/2018       RE: Bettina Guerrero  [address]   [facility] [address]     Dear Colleague,    Thank you for referring your patient, Bettina Guerrero, to the [facility] SPORTS AND ORTHOPAED[facility] WALK [facility] CLINIC at [facility]. Please see a copy of my visit note below.          SPORTS & ORTHOPEDIC WALK-IN VISIT 4/18/2018    Primary Care Physician: Dr. Choi  Past 3 weeks has had a few falls on the back. Also lifting up the garage.   Reason for visit:     What part of your body is injured / painful?  left shoulder/periscapular    What caused the injury /pain? Fall    How long ago did your injury occur or pain begin? several weeks ago    What are your most bothersome symptoms? Pain    How would you characterize your symptom?  sharp    What makes your symptoms better? Heat    What makes your symptoms worse? Movement    Have you been previously seen for this problem? No    Medical History:    Any recent changes to your medical history? No    Any new medication prescribed since last visit? No    Have you had surgery on this body part before? No    Social History:    Occupation: Retired    Handedness: Right    Exercise: None    Review of Systems:    Do you have fever, chills, weight loss? No    Do you have any vision problems? No    Do you have any chest pain or edema? No    Do you have any shortness of breath or wheezing?  No    Do you have stomach problems? Yes, constipation     Do you have any numbness or focal weakness? No    Do you have diabetes? No, but had Type 2    Do you have problems with bleeding or clotting? No    Do you have an rashes or other skin lesions? No           CHIEF COMPLAINT:  Consult (L sided periscapula pain)       HISTORY OF PRESENT ILLNESS  Ms. Guerrero is a pleasant 68 year old year old female who presents to clinic today with left shoulder and back pain.  Bettina explains that she began to experience pain initially after fall on the  "ice 3 weeks ago.  She fell onto her back.  Mild discomfort of her left posterior shoulder and back at this time.  More recently she recalls helping her  lift a garage door (motor not working)  and experiencing a twinge of pain in the periscapular region of her left shoulder.  Subsequently has had to lift a garage door multiple times, incurring pain with each event.  Over the past week she has had more significant pain which is causing her to lose sleep.  Pain is localized to the left periscapular region mostly.  She has decreased range of motion of her left shoulder and unable to \"put on her bra\".  She has tried heat for treatment.    Additional history: as documented    MEDICAL HISTORY  Patient Active Problem List   Diagnosis     Diabetes mellitus (H)     Hypertension     Asthma in adult     Excessive daytime sleepiness     Snoring     Hyperlipidemia LDL goal <100     Intraductal papilloma of right breast       NOT TAKING ANY MEDS LISTED - Changed insurance and waiting for insurance co to assign provider.    Current Outpatient Prescriptions   Medication     tiZANidine (ZANAFLEX) 4 MG tablet     albuterol (PROAIR HFA, PROVENTIL HFA, VENTOLIN HFA) 108 (90 BASE) MCG/ACT inhaler     amLODIPine (NORVASC) 10 MG tablet     atorvastatin (LIPITOR) 40 MG tablet     blood glucose monitoring (ACCU-CHEK GREGORIO PLUS) meter device kit     blood glucose monitoring (ACCU-CHEK MULTICLIX) lancets     blood glucose monitoring (NO BRAND SPECIFIED) test strip     hydrochlorothiazide (HYDRODIURIL) 25 MG tablet     insulin glargine (LANTUS SOLOSTAR) 100 UNIT/ML injection     insulin lispro (HUMALOG KWIKPEN) 100 UNIT/ML injection     lisinopril (PRINIVIL/ZESTRIL) 40 MG tablet     metFORMIN (GLUCOPHAGE-XR) 500 MG 24 hr tablet     No current facility-administered medications for this visit.          Allergies   Allergen Reactions     Seasonal Allergies      Congesion, pain in sinues, lots of sputum       Family History   Problem " Relation Age of Onset     Esophageal Cancer Mother      Lung Cancer Father      Liver Cancer Father      Lymphoma Daughter      Bone Cancer Maternal Aunt      Leukemia Maternal Aunt      Colonoscopy 5/2016    Additional medical/Social/Surgical histories reviewed in [facility] and updated as appropriate.     REVIEW OF SYSTEMS (4/18/2018)  CONSTITUTIONAL: Denies fever and weight loss  EYES: Denies acute vision changes  ENT: Denies hearing changes or difficulty swallowing  CARDIAC: Denies chest pain or edema  RESPIRATORY: Denies dyspnea, cough or wheeze  GASTROINTESTINAL: Denies abdominal pain, nausea, vomiting, endorses episodes of black stools  MUSCULOSKELETAL: See HPI  SKIN: Denies any recent rash or lesion  NEUROLOGICAL: Denies numbness or focal weakness  PSYCHIATRIC: No history of psychiatric symptoms or problems  ENDOCRINE: Diagnosis of diabetes: Uncontrolled diabetes type 2, not taking meds  HEMATOLOGY: Denies episodes of easy bleeding      PHYSICAL EXAM  BP (!) 154/95  Pulse 90    General  - normal appearance, in no obvious distress  CV  - normal radial pulse  Pulm  - normal respiratory pattern, non-labored  Musculoskeletal - left shoulder  - inspection: normal bone and joint alignment, no obvious deformity, no scapular winging, no AC step-off  - palpation: no bony tenderness, normal clavicle, non-tender AC.  No tenderness at rotator cuff insertion and greater tuberosity.  Exquisite tenderness to palpation and periscapular region along rhomboideus major and rhomboid is minor.  Additional tenderness palpation at levator scapula.  - ROM:  120 deg flexion   45 deg extension   150 deg abduction   60 deg ER    IR to L1 vs. T10 contralat  - strength: 5/5  strength, 5/5 in all shoulder planes  - special tests:  (-) Speed's  (-) Neer  (+) Hawkin's  (-) Alber  (-) [place]'s  (-) apprehension  (-) subscap lift-off  Neuro  - no sensory or motor deficit, grossly normal coordination, normal muscle tone  Skin  - no ecchymosis,  erythema, warmth, or induration, no obvious rash  Psych  - interactive, appropriate, normal mood and affect    IMAGING : XR Shoulder 4V. Final results and radiologist's interpretation, available in the [facility] health record. Images were reviewed with the patient/family members in the office today. My personal interpretation of the performed imaging is no acute osseous abnormality.  Calcific tendonitis.     ASSESSMENT & PLAN  Ms. Guerrero is a 68 year old year old female who presents to clinic today with left-sided periscapular pain with onset after a fall onto her back, as well as manually lifting heavy garage door almost daily for the past couple weeks.    Diagnosis:   Left-sided rhomboid strain  Rotator cuff calcific tendonitis left    -Heat, stretching/home exercise program  -Tizanidine 4mg up to TID, best QHS   -Tylenol discussed; avoid ibuprofen until dark stools are tested  -Referral for PT if no improvement in 1 week with home exercise program  -PCP referral - [facility] for DM2, possible melanotic stools  -Follow up 1 month    It was a pleasure seeing Bettina today.    Again, thank you for allowing me to participate in the care of your patient.      Sincerely,    Jae Hurtado, DO

## 2018-04-18 NOTE — PROGRESS NOTES
SPORTS & ORTHOPEDIC WALK-IN VISIT 4/18/2018    Primary Care Physician: Dr. Choi  Past 3 weeks has had a few falls on the back. Also lifting up the garage.   Reason for visit:     What part of your body is injured / painful?  left shoulder/periscapular    What caused the injury /pain? Fall    How long ago did your injury occur or pain begin? several weeks ago    What are your most bothersome symptoms? Pain    How would you characterize your symptom?  sharp    What makes your symptoms better? Heat    What makes your symptoms worse? Movement    Have you been previously seen for this problem? No    Medical History:    Any recent changes to your medical history? No    Any new medication prescribed since last visit? No    Have you had surgery on this body part before? No    Social History:    Occupation: Retired    Handedness: Right    Exercise: None    Review of Systems:    Do you have fever, chills, weight loss? No    Do you have any vision problems? No    Do you have any chest pain or edema? No    Do you have any shortness of breath or wheezing?  No    Do you have stomach problems? Yes, constipation     Do you have any numbness or focal weakness? No    Do you have diabetes? No, but had Type 2    Do you have problems with bleeding or clotting? No    Do you have an rashes or other skin lesions? No

## 2018-06-08 ENCOUNTER — OFFICE VISIT (OUTPATIENT)
Dept: INTERNAL MEDICINE | Facility: CLINIC | Age: 68
End: 2018-06-08
Payer: COMMERCIAL

## 2018-06-08 VITALS
RESPIRATION RATE: 20 BRPM | HEART RATE: 85 BPM | WEIGHT: 162.2 LBS | OXYGEN SATURATION: 98 % | BODY MASS INDEX: 28.73 KG/M2 | SYSTOLIC BLOOD PRESSURE: 143 MMHG | DIASTOLIC BLOOD PRESSURE: 81 MMHG

## 2018-06-08 DIAGNOSIS — E78.5 HYPERLIPIDEMIA LDL GOAL <100: ICD-10-CM

## 2018-06-08 DIAGNOSIS — Z79.4 TYPE 2 DIABETES MELLITUS WITHOUT COMPLICATION, WITH LONG-TERM CURRENT USE OF INSULIN (H): ICD-10-CM

## 2018-06-08 DIAGNOSIS — I10 ESSENTIAL HYPERTENSION: ICD-10-CM

## 2018-06-08 DIAGNOSIS — M25.512 LEFT SHOULDER PAIN, UNSPECIFIED CHRONICITY: ICD-10-CM

## 2018-06-08 DIAGNOSIS — Z91.199 NON-ADHERENCE TO MEDICAL TREATMENT: ICD-10-CM

## 2018-06-08 DIAGNOSIS — E11.9 TYPE 2 DIABETES MELLITUS WITHOUT COMPLICATION, WITH LONG-TERM CURRENT USE OF INSULIN (H): ICD-10-CM

## 2018-06-08 DIAGNOSIS — K92.1 BLACK STOOLS: ICD-10-CM

## 2018-06-08 DIAGNOSIS — Z79.4 TYPE 2 DIABETES MELLITUS WITHOUT COMPLICATION, WITH LONG-TERM CURRENT USE OF INSULIN (H): Primary | ICD-10-CM

## 2018-06-08 DIAGNOSIS — K59.01 SLOW TRANSIT CONSTIPATION: ICD-10-CM

## 2018-06-08 DIAGNOSIS — I10 BENIGN ESSENTIAL HYPERTENSION: ICD-10-CM

## 2018-06-08 DIAGNOSIS — E11.9 TYPE 2 DIABETES MELLITUS WITHOUT COMPLICATION, WITH LONG-TERM CURRENT USE OF INSULIN (H): Primary | ICD-10-CM

## 2018-06-08 LAB
ALBUMIN SERPL-MCNC: 3.6 G/DL (ref 3.4–5)
ALP SERPL-CCNC: 94 U/L (ref 40–150)
ALT SERPL W P-5'-P-CCNC: 22 U/L (ref 0–50)
ANION GAP SERPL CALCULATED.3IONS-SCNC: 7 MMOL/L (ref 3–14)
AST SERPL W P-5'-P-CCNC: 11 U/L (ref 0–45)
BILIRUB SERPL-MCNC: 0.4 MG/DL (ref 0.2–1.3)
BUN SERPL-MCNC: 24 MG/DL (ref 7–30)
CALCIUM SERPL-MCNC: 9 MG/DL (ref 8.5–10.1)
CHLORIDE SERPL-SCNC: 100 MMOL/L (ref 94–109)
CO2 SERPL-SCNC: 31 MMOL/L (ref 20–32)
CREAT SERPL-MCNC: 0.99 MG/DL (ref 0.52–1.04)
ERYTHROCYTE [DISTWIDTH] IN BLOOD BY AUTOMATED COUNT: 12.3 % (ref 10–15)
GFR SERPL CREATININE-BSD FRML MDRD: 56 ML/MIN/1.7M2
GLUCOSE SERPL-MCNC: 299 MG/DL (ref 70–99)
HBA1C MFR BLD: 12.4 % (ref 0–5.6)
HCT VFR BLD AUTO: 39.6 % (ref 35–47)
HGB BLD-MCNC: 13 G/DL (ref 11.7–15.7)
MCH RBC QN AUTO: 30.1 PG (ref 26.5–33)
MCHC RBC AUTO-ENTMCNC: 32.8 G/DL (ref 31.5–36.5)
MCV RBC AUTO: 92 FL (ref 78–100)
PLATELET # BLD AUTO: 253 10E9/L (ref 150–450)
POTASSIUM SERPL-SCNC: 4.1 MMOL/L (ref 3.4–5.3)
PROT SERPL-MCNC: 7.7 G/DL (ref 6.8–8.8)
RBC # BLD AUTO: 4.32 10E12/L (ref 3.8–5.2)
SODIUM SERPL-SCNC: 137 MMOL/L (ref 133–144)
WBC # BLD AUTO: 8.6 10E9/L (ref 4–11)

## 2018-06-08 RX ORDER — LORATADINE 10 MG/1
10 TABLET ORAL DAILY PRN
COMMUNITY

## 2018-06-08 RX ORDER — HYDROCHLOROTHIAZIDE 25 MG/1
50 TABLET ORAL DAILY
Qty: 180 TABLET | Refills: 3 | Status: SHIPPED | OUTPATIENT
Start: 2018-06-08 | End: 2018-10-06

## 2018-06-08 RX ORDER — POLYETHYLENE GLYCOL 3350 17 G/17G
1 POWDER, FOR SOLUTION ORAL DAILY
Qty: 119 G | Refills: 3 | Status: SHIPPED | OUTPATIENT
Start: 2018-06-08 | End: 2022-09-08

## 2018-06-08 RX ORDER — LANCETS
1 EACH MISCELLANEOUS 3 TIMES DAILY
Qty: 100 EACH | Refills: 3 | Status: SHIPPED | OUTPATIENT
Start: 2018-06-08

## 2018-06-08 RX ORDER — ATORVASTATIN CALCIUM 40 MG/1
40 TABLET, FILM COATED ORAL DAILY
Qty: 30 TABLET | Refills: 2 | Status: SHIPPED | OUTPATIENT
Start: 2018-06-08 | End: 2022-09-08

## 2018-06-08 ASSESSMENT — PAIN SCALES - GENERAL: PAINLEVEL: SEVERE PAIN (7)

## 2018-06-08 NOTE — MR AVS SNAPSHOT
After Visit Summary   6/8/2018    Bettina Guerrero    MRN: [id number]           Patient Information     Date Of Birth          1950        Visit Information        Provider Department      6/8/2018 3:00 PM Trinity Choi APRN [facility]        Today's Diagnoses     Type 2 diabetes mellitus without complication, with long-term current use of insulin (H)    -  1    Essential hypertension        Black stools        Left shoulder pain, unspecified chronicity        Hyperlipidemia LDL goal <100        Benign essential hypertension        Slow transit constipation          Care Instructions    Primary Care Center: [phone]     [facility] Medication Refill Request Information:  * Please contact your pharmacy regarding ANY request for medication refills.  ** [facility] Prescription Fax = [phone]  * Please allow 3 business days for routine medication refills.  * Please allow 5 business days for controlled substance medication refills.     Primary Care Center Test Result notification information:  *You will be notified with in 7-10 days of your appointment day regarding the results of your test.  If you are on MyChart you will be notified as soon as the provider has reviewed the results and signed off on them.    Schedule an appointment with Diabetic Educator.     Schedule physical therapy to help with your shoulder pain.    Do the FIT test (stool sample) to screen for blood in your stool. If this is abnormal we will need to get a colonoscopy.    Restart your medication:   -Hydrochlorothiazide 50 mg per day  -Atorvastatin 40 mg per day  -Lantus 18 units per day    Check your blood sugar 3 times a day.            Follow-ups after your visit        Additional Services     DIABETES EDUCATOR REFERRAL       DIABETES SELF MANAGEMENT TRAINING (DSMT)      Your provider has referred you to Diabetes Education: [facility]: Diabetes Education - [facility] [phone]  [phone]   [email or web address]     If an urgent visit is needed or A1C is above 12, Care Team to call the Diabetes  Education Team at [phone] or send an In Basket message to the Diabetes Education Pool (P DIAB ED-PATIENT CARE).    A  will call you to make your appointment. If it has been more than 3 business days since your referral was placed, please call the above phone number to schedule.    Type of training and number of hours: New Diagnosis: Initial group DSMT - 10 hours.      Diabetes Type: Type 2 - On Oral Medication   Medicare covers: 10 hours of initial DSMT in 12 month period from the time of first visit, plus 2 hours of follow-up DSMT annually, and additional hours as requested for insulin training.         Diabetes Co-Morbidities: dyslipidemia and hypertension               A1C Goal:  <7.0       A1C is: Lab Results       Component                Value               Date                       A1C                      12.8                08/10/2017              MEDICAL NUTRITION THERAPY (MNT) for Diabetes    Medical Nutrition Therapy with a Registered Dietitian can be provided in coordination with Diabetes Self-Management Training to assist in achieving optimal diabetes management.    MNT Type and Hours: Previous diagnosis: Annual follow-up MNT - 2 hours                       Medicare will cover: 3 hours initial MNT in 12 month period after first visit, plus 2 hours of follow-up MNT annually        Diabetes Education Topics: Comprehensive Knowledge Assessment and Instruction    Special Educational Needs Requiring Individual DSMT: None      Please be aware that coverage of these services is subject to the terms and limitations of your health insurance plan.  Call member services at your health plan to determine Diabetes Self-Management Training (Codes  and ) and Medical Nutrition Therapy (Codes 11985 and 80194) benefits and ask which  "blood glucose monitor brands are covered by your plan.  Please bring the following with you to your appointment:    (1)  List of current medications   (2)  List of Blood Glucose Monitor brands that are covered by your insurance plan  (3)  Blood Glucose Monitor and log book  (4)   Food records for the 3 days prior to your visit    The Certified Diabetes Educator may make diabetes medication adjustments per the CDE Protocol and Collaborative Practice Agreement.            PHYSICAL THERAPY REFERRAL       *This therapy referral will be filtered to a centralized scheduling office at [facility] and the patient will receive a call to schedule an appointment at a [facility] location most convenient for them. *     [facility] provides Physical Therapy evaluation and treatment and many specialty services across the [facility] system.  If requesting a specialty program, please choose from the list below.    If you have not heard from the scheduling office within 2 business days, please call [phone] for all locations, with the exception of [place], please call [phone] and [facility], please call [phone]  Treatment: Evaluation & Treatment  Special Instructions/Modalities:   Special Programs:     Please be aware that coverage of these services is subject to the terms and limitations of your health insurance plan.  Call member services at your health plan with any benefit or coverage questions.      **Note to Provider:  If you are referring outside of [facility] for the therapy appointment, please list the name of the location in the \"special instructions\" above, print the referral and give to the patient to schedule the appointment.                  Follow-up notes from your care team     Return in about 3 weeks (around 6/29/2018).      Your next 10 appointments already scheduled     Jun 27, 2018 10:30 AM CDT   (Arrive by 10:15 AM)   Office Visit with [facility] ([facility])    [address]  3rd Floor  [facility] [id number] [phone]           Bring a current list of meds and any records pertaining to this visit. For Physicals, please bring immunization records and any forms needing to be filled out. Please arrive 10 minutes early to complete paperwork.            Jul 10, 2018 11:30 AM CDT   (Arrive by 11:15 AM)   Return Visit with BROOKE Heredia CNP   [facility] ([facility])    [address] [place] [address]  4th [facility] [id number] [phone]              Future tests that were ordered for you today     Open Future Orders        Priority Expected Expires Ordered    HEMOGLOBIN A1C -(Today) Routine 2018    Fecal cancer screen FIT Routine 2018    Comprehensive metabolic panel Routine 2018    CBC with platelets Routine 2018            Who to contact     Please call your clinic at [phone] to:    Ask questions about your health    Make or cancel appointments    Discuss your medicines    Learn about your test results    Speak to your doctor            Additional Information About Your Visit        MyChart Information     MyHeritaget is an electronic gateway that provides easy, online access to your medical records. With [employer], you can request a clinic appointment, read your test results, renew a prescription or communicate with your care team.     To sign up for MyHeritaget visit the website at [email or web address]   You will be asked to enter the access code listed below, as well as some personal information. Please follow the directions to create your username and password.     Your access code is: [email or web address]  Expires: 2018  3:07 PM     Your access code will  in 90 days. If you need help or a new code, please contact your [facility] or call [phone] for assistance.        Care EveryWhere ID     This is your Care EveryWhere ID. This could be used by other organizations to access your [place] medical records  [id number]        Your Vitals Were     Pulse Respirations Pulse Oximetry BMI (Body Mass Index)          85 20 98% 28.73 kg/m2         Blood Pressure from Last 3 Encounters:   06/08/18 143/81   04/18/18 (!) 154/95   09/25/17 122/72    Weight from Last 3 Encounters:   06/08/18 73.6 kg (162 lb 3.2 oz)   09/25/17 77.1 kg (169 lb 14.4 oz)   08/17/17 75.4 kg (166 lb 4.8 oz)              We Performed the Following     DIABETES EDUCATOR REFERRAL     PHYSICAL THERAPY REFERRAL          Today's Medication Changes          These changes are accurate as of 6/8/18  3:46 PM.  If you have any questions, ask your nurse or doctor.               Start taking these medicines.        Dose/Directions    blood glucose monitoring meter device kit   Commonly known as:  no brand specified   Used for:  Type 2 diabetes mellitus without complication, with long-term current use of insulin (H)        Use to test blood sugar 3 times daily or as directed.   Quantity:  1 kit   Refills:  0       COMFORT LANCETS Misc   Used for:  Type 2 diabetes mellitus without complication, with long-term current use of insulin (H)        Dose:  1 each   1 each 3 times daily   Quantity:  100 each   Refills:  3       polyethylene glycol powder   Commonly known as:  MIRALAX   Used for:  Slow transit constipation        Dose:  1 capful   Take 17 g (1 capful) by mouth daily   Quantity:  119 g   Refills:  3            Where to get your medicines      These medications were sent to [id number] - [place], [place] - [address], [place] [address]     Phone:  [phone]     atorvastatin 40 MG tablet    blood glucose monitoring meter device kit    COMFORT LANCETS Misc     hydrochlorothiazide 25 MG tablet    insulin glargine 100 UNIT/ML injection    polyethylene glycol powder                Primary Care Provider Office Phone # Fax #    BROOKE Heredia [facility] [phone] [address] [facility] [address]        Equal Access to Services     IVORY FARAH : Hadii aad ku hadasho Soomaali, waaxda luqadaha, qaybta kaalmada adeegyada, waxay tylerin hayaan adeoscar buttsalirezasoumya reddy. So [facility] [phone].    ATENCIÓN: Si habla español, tiene a rene disposición servicios gratuitos de asistencia lingüística. [facility] [phone].    We comply with applicable federal civil rights laws and [place] laws. We do not discriminate on the basis of race, color, national origin, age, disability, sex, sexual orientation, or gender identity.            Thank you!     Thank you for choosing [facility]  for your care. Our goal is always to provide you with excellent care. Hearing back from our patients is one way we can continue to improve our services. Please take a few minutes to complete the written survey that you may receive in the mail after your visit with us. Thank you!             Your Updated Medication List - Protect others around you: Learn how to safely use, store and throw away your medicines at www.disposemymeds.org.          This list is accurate as of 6/8/18  3:46 PM.  Always use your most recent med list.                   Brand Name Dispense Instructions for use Diagnosis    albuterol 108 (90 Base) MCG/ACT Inhaler    PROAIR HFA/PROVENTIL HFA/VENTOLIN HFA    1 Inhaler    Inhale 2 puffs into the lungs every 6 hours as needed for shortness of breath / dyspnea or wheezing    Allergic rhinitis due to pollen       ARTHRITIS PAIN RELIEF PO      Take 650 mg by mouth daily Takes 2 tablets daily in am.Christine Escalante LPN 3:00 PM on 6/8/2018        atorvastatin 40 MG tablet    LIPITOR    30 tablet    Take 1 tablet (40 mg) by mouth daily    Hyperlipidemia  LDL goal <100       blood glucose monitoring meter device kit    no brand specified    1 kit    Use to test blood sugar 3 times daily or as directed.    Type 2 diabetes mellitus without complication, with long-term current use of insulin (H)       COMFORT LANCETS Misc     100 each    1 each 3 times daily    Type 2 diabetes mellitus without complication, with long-term current use of insulin (H)       hydrochlorothiazide 25 MG tablet    HYDRODIURIL    180 tablet    Take 2 tablets (50 mg) by mouth daily    Benign essential hypertension       insulin glargine 100 UNIT/ML injection    LANTUS SOLOSTAR    18 mL    Inject 18 Units Subcutaneous At Bedtime 18 units at bedtime.    Type 2 diabetes mellitus without complication, with long-term current use of insulin (H)       loratadine 10 MG tablet    CLARITIN     Take 10 mg by mouth as needed for allergies        polyethylene glycol powder    MIRALAX    119 g    Take 17 g (1 capful) by mouth daily    Slow transit constipation

## 2018-06-08 NOTE — PATIENT INSTRUCTIONS
[facility]: [phone]     [facility] Medication Refill Request Information:  * Please contact your pharmacy regarding ANY request for medication refills.  ** [facility] Prescription Fax = [phone]  * Please allow 3 business days for routine medication refills.  * Please allow 5 business days for controlled substance medication refills.     [facility] Test Result notification information:  *You will be notified with in 7-10 days of your appointment day regarding the results of your test.  If you are on MyChart you will be notified as soon as the provider has reviewed the results and signed off on them.    Schedule an appointment with Diabetic Educator.     Schedule physical therapy to help with your shoulder pain.    Do the FIT test (stool sample) to screen for blood in your stool. If this is abnormal we will need to get a colonoscopy.    Restart your medication:   -Hydrochlorothiazide 50 mg per day  -Atorvastatin 40 mg per day  -Lantus 18 units per day    Check your blood sugar 3 times a day.

## 2018-06-08 NOTE — NURSING NOTE
Chief Complaint   Patient presents with     Pain     Pain in left upper back on the left side since fall in January     Melena     Ever since had colonoscopy (fall of 2015) has had black stools and pain across lower abdomen     Pain     pain below left breast   Christine Escalante LPN 3:02 PM on 6/8/2018    Rooming Note  Blood Pressure   BP Readings from Last 1 Encounters:   06/08/18 143/81    Single BP recheck started, 3:03 PM (4 minutes)    Christine Ecsalante LPN 3:03 PM on 6/8/2018
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
## 2018-06-12 ENCOUNTER — TELEPHONE (OUTPATIENT)
Dept: INTERNAL MEDICINE | Facility: CLINIC | Age: 68
End: 2018-06-12

## 2018-06-12 DIAGNOSIS — R80.9 TYPE 2 DIABETES MELLITUS WITH MICROALBUMINURIA, UNSPECIFIED LONG TERM INSULIN USE STATUS: Primary | ICD-10-CM

## 2018-06-12 DIAGNOSIS — E11.29 TYPE 2 DIABETES MELLITUS WITH MICROALBUMINURIA, UNSPECIFIED LONG TERM INSULIN USE STATUS: Primary | ICD-10-CM

## 2018-06-12 PROBLEM — Z91.199 NON-ADHERENCE TO MEDICAL TREATMENT: Status: ACTIVE | Noted: 2018-06-12

## 2018-06-12 RX ORDER — GLIPIZIDE 5 MG/1
5 TABLET, FILM COATED, EXTENDED RELEASE ORAL DAILY
Qty: 30 TABLET | Refills: 1 | Status: SHIPPED | OUTPATIENT
Start: 2018-06-12 | End: 2022-09-08

## 2018-06-12 NOTE — TELEPHONE ENCOUNTER
[facility] Call Center    Phone Message    May a detailed message be left on voicemail: yes    Reason for Call: Other: Pt called requesting to change her Rx for Lantus to Glipizide twice/daily. Pt stated her insurance has her paying $400 for a refill of the Lantus. Her insurance provider, Desiree, suggested she request Glipizide as it will only cost her $4.21 in co-payment for the medication. Pt uses [facility] [address], Fax: [phone]     Action Taken: Message routed to:  [facility] ([facility] ABI

## 2018-06-12 NOTE — TELEPHONE ENCOUNTER
We can start the Glipizide for now instead of the Lantus d/t cost, will do 5 mg extended release once daily and increase the dose from there. She needs to monitor her glucose to assess response.  BROOKE Sarabia CNP

## 2018-06-13 ENCOUNTER — TELEPHONE (OUTPATIENT)
Dept: INTERNAL MEDICINE | Facility: CLINIC | Age: 68
End: 2018-06-13

## 2018-06-13 NOTE — TELEPHONE ENCOUNTER
Spoke with Bettina about her lab results, with poorly controlled diabetes. She was not able to afford the insulin. Will start Glipizide in the meantime. Reviewed importance of checking blood sugar so we can increase the dose based on her response. Discussed that this will not be enough to control her diabetes and we will need to add on other agents (likely Insulin), will try to find brand that is better covered. Emphasized importance of f/u with diabetic educator and scheduling appointment with endocrinology clinic.  BROOKE Sarabia CNP

## 2018-06-14 DIAGNOSIS — K92.1 BLACK STOOLS: ICD-10-CM

## 2018-06-14 LAB — HEMOCCULT STL QL IA: NEGATIVE

## 2018-06-14 PROCEDURE — 82274 ASSAY TEST FOR BLOOD FECAL: CPT | Performed by: NURSE PRACTITIONER

## 2018-07-31 ENCOUNTER — RADIANT APPOINTMENT (OUTPATIENT)
Dept: GENERAL RADIOLOGY | Facility: CLINIC | Age: 68
End: 2018-07-31
Attending: FAMILY MEDICINE
Payer: COMMERCIAL

## 2018-07-31 ENCOUNTER — OFFICE VISIT (OUTPATIENT)
Dept: ORTHOPEDICS | Facility: CLINIC | Age: 68
End: 2018-07-31
Payer: COMMERCIAL

## 2018-07-31 VITALS
WEIGHT: 162 LBS | SYSTOLIC BLOOD PRESSURE: 127 MMHG | DIASTOLIC BLOOD PRESSURE: 62 MMHG | BODY MASS INDEX: 28.7 KG/M2 | HEART RATE: 87 BPM

## 2018-07-31 DIAGNOSIS — R29.898 LEFT LEG WEAKNESS: ICD-10-CM

## 2018-07-31 DIAGNOSIS — M25.562 LEFT KNEE PAIN, UNSPECIFIED CHRONICITY: ICD-10-CM

## 2018-07-31 DIAGNOSIS — M17.12 PRIMARY OSTEOARTHRITIS OF LEFT KNEE: Primary | ICD-10-CM

## 2018-07-31 RX ORDER — TRIAMCINOLONE ACETONIDE 40 MG/ML
40 INJECTION, SUSPENSION INTRA-ARTICULAR; INTRAMUSCULAR
Status: DISCONTINUED | OUTPATIENT
Start: 2018-07-31 | End: 2022-09-08

## 2018-07-31 RX ADMIN — TRIAMCINOLONE ACETONIDE 40 MG: 40 INJECTION, SUSPENSION INTRA-ARTICULAR; INTRAMUSCULAR at 15:27
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
## 2018-07-31 NOTE — MR AVS SNAPSHOT
After Visit Summary   7/31/2018    Bettina Guerrero    MRN: [id number]           Patient Information     Date Of Birth          1950        Visit Information        Provider Department      7/31/2018 2:20 PM Jae Hurtado DO [facility]        Today's Diagnoses     Left knee pain, unspecified chronicity    -  1    Left leg weakness           Follow-ups after your visit        Additional Services     PHYSICAL THERAPY REFERRAL (External-Prints)       Physical Therapy Referral  - Pool therapy if possible for left hip, knee strengthening and develop  HEP.                  Your next 10 appointments already scheduled     Aug 28, 2018 12:00 PM CDT   Return Walk In Ortho with DO [facility] ([email or web address] and [facility])    [address]  [address] [facility] [address] [phone]              Who to contact     Please call your clinic at [phone] to:    Ask questions about your health    Make or cancel appointments    Discuss your medicines    Learn about your test results    Speak to your doctor            Additional Information About Your Visit        Care EveryWhere ID     This is your Care EveryWhere ID. This could be used by other organizations to access your [facility] medical records  [id number]        Your Vitals Were     Pulse BMI (Body Mass Index)                87 28.7 kg/m2           Blood Pressure from Last 3 Encounters:   07/31/18 127/62   06/08/18 143/81   04/18/18 (!) 154/95    Weight from Last 3 Encounters:   07/31/18 73.5 kg (162 lb)   06/08/18 73.6 kg (162 lb 3.2 oz)   09/25/17 77.1 kg (169 lb 14.4 oz)              We Performed the Following     Large Joint Injection/Arthocentesis     PHYSICAL THERAPY REFERRAL (External-Prints)        Primary Care Provider Office Phone # Fax #    BROOKE Heredia [facility] [phone]       [address] [place] [address]  SE  [facility] [address]        Equal Access to Services     IVORY FARAH : Hadii aad ku hadnoemyjermaine Juanali, waclarenceda luqmichaelha, qacarsonta kaalmamaria t koo, dany reddy. So [facility] [phone].    ATENCIÓN: Si habla español, tiene a rene disposición servicios gratuitos de asistencia lingüística. Lvame al [phone].    We comply with applicable federal civil rights laws and [place] laws. We do not discriminate on the basis of race, color, national origin, age, disability, sex, sexual orientation, or gender identity.            Thank you!     Thank you for choosing [facility] CLINIC  for your care. Our goal is always to provide you with excellent care. Hearing back from our patients is one way we can continue to improve our services. Please take a few minutes to complete the written survey that you may receive in the mail after your visit with us. Thank you!             Your Updated Medication List - Protect others around you: Learn how to safely use, store and throw away your medicines at www.disposemymeds.org.          This list is accurate as of 7/31/18  3:38 PM.  Always use your most recent med list.                   Brand Name Dispense Instructions for use Diagnosis    albuterol 108 (90 Base) MCG/ACT Inhaler    PROAIR HFA/PROVENTIL HFA/VENTOLIN HFA    1 Inhaler    Inhale 2 puffs into the lungs every 6 hours as needed for shortness of breath / dyspnea or wheezing    Allergic rhinitis due to pollen       ARTHRITIS PAIN RELIEF PO      Take 650 mg by mouth daily Takes 2 tablets daily in am.Christine Escalante LPN 3:00 PM on 6/8/2018        atorvastatin 40 MG tablet    LIPITOR    30 tablet    Take 1 tablet (40 mg) by mouth daily    Hyperlipidemia LDL goal <100       blood glucose monitoring meter device kit    no brand specified    1 kit    Use to test blood sugar 3 times daily or as directed.    Type 2 diabetes mellitus without complication, with long-term current use  of insulin (H)       COMFORT LANCETS Misc     100 each    1 each 3 times daily    Type 2 diabetes mellitus without complication, with long-term current use of insulin (H)       glipiZIDE 5 MG 24 hr tablet    GLUCOTROL XL    30 tablet    Take 1 tablet (5 mg) by mouth daily    Type 2 diabetes mellitus with microalbuminuria, unspecified long term insulin use status (H)       hydrochlorothiazide 25 MG tablet    HYDRODIURIL    180 tablet    Take 2 tablets (50 mg) by mouth daily    Benign essential hypertension       insulin glargine 100 UNIT/ML injection    LANTUS SOLOSTAR    18 mL    Inject 18 Units Subcutaneous At Bedtime 18 units at bedtime.    Type 2 diabetes mellitus without complication, with long-term current use of insulin (H)       loratadine 10 MG tablet    CLARITIN     Take 10 mg by mouth as needed for allergies        polyethylene glycol powder    MIRALAX    119 g    Take 17 g (1 capful) by mouth daily    Slow transit constipation

## 2018-08-16 ENCOUNTER — RECORDS - HEALTHEAST (OUTPATIENT)
Dept: LAB | Facility: CLINIC | Age: 68
End: 2018-08-16

## 2018-08-16 ENCOUNTER — AMBULATORY - HEALTHEAST (OUTPATIENT)
Dept: CARDIAC REHAB | Facility: CLINIC | Age: 68
End: 2018-08-16

## 2018-08-16 DIAGNOSIS — Z95.5 S/P CORONARY ARTERY STENT PLACEMENT: ICD-10-CM

## 2018-08-16 DIAGNOSIS — I21.3 ST ELEVATION MYOCARDIAL INFARCTION (STEMI) (H): ICD-10-CM

## 2018-08-16 LAB
ANION GAP SERPL CALCULATED.3IONS-SCNC: 8 MMOL/L (ref 5–18)
BUN SERPL-MCNC: 21 MG/DL (ref 8–22)
CALCIUM SERPL-MCNC: 9.1 MG/DL (ref 8.5–10.5)
CHLORIDE BLD-SCNC: 105 MMOL/L (ref 98–107)
CO2 SERPL-SCNC: 24 MMOL/L (ref 22–31)
CREAT SERPL-MCNC: 0.89 MG/DL (ref 0.6–1.1)
GFR SERPL CREATININE-BSD FRML MDRD: >60 ML/MIN/1.73M2
GLUCOSE BLD-MCNC: 155 MG/DL (ref 70–125)
POTASSIUM BLD-SCNC: 4.2 MMOL/L (ref 3.5–5)
SODIUM SERPL-SCNC: 137 MMOL/L (ref 136–145)

## 2018-08-21 ENCOUNTER — AMBULATORY - HEALTHEAST (OUTPATIENT)
Dept: CARDIAC REHAB | Facility: CLINIC | Age: 68
End: 2018-08-21

## 2018-08-21 DIAGNOSIS — Z95.5 S/P CORONARY ARTERY STENT PLACEMENT: ICD-10-CM

## 2018-08-21 DIAGNOSIS — I21.3 ST ELEVATION MYOCARDIAL INFARCTION (STEMI) (H): ICD-10-CM

## 2018-08-22 ENCOUNTER — AMBULATORY - HEALTHEAST (OUTPATIENT)
Dept: CARDIAC REHAB | Facility: CLINIC | Age: 68
End: 2018-08-22

## 2018-08-22 DIAGNOSIS — I21.11 ST ELEVATION MYOCARDIAL INFARCTION INVOLVING RIGHT CORONARY ARTERY (H): ICD-10-CM

## 2018-08-22 DIAGNOSIS — Z95.5 S/P CORONARY ARTERY STENT PLACEMENT: ICD-10-CM

## 2018-08-22 LAB
GLUCOSE BLDC GLUCOMTR-MCNC: 120 MG/DL
GLUCOSE BLDC GLUCOMTR-MCNC: 125 MG/DL

## 2018-09-17 ENCOUNTER — RECORDS - HEALTHEAST (OUTPATIENT)
Dept: LAB | Facility: CLINIC | Age: 68
End: 2018-09-17

## 2018-09-17 LAB
ANION GAP SERPL CALCULATED.3IONS-SCNC: 11 MMOL/L (ref 5–18)
BUN SERPL-MCNC: 20 MG/DL (ref 8–22)
CALCIUM SERPL-MCNC: 9.1 MG/DL (ref 8.5–10.5)
CHLORIDE BLD-SCNC: 105 MMOL/L (ref 98–107)
CO2 SERPL-SCNC: 25 MMOL/L (ref 22–31)
CREAT SERPL-MCNC: 0.81 MG/DL (ref 0.6–1.1)
GFR SERPL CREATININE-BSD FRML MDRD: >60 ML/MIN/1.73M2
GLUCOSE BLD-MCNC: 152 MG/DL (ref 70–125)
POTASSIUM BLD-SCNC: 3.9 MMOL/L (ref 3.5–5)
SODIUM SERPL-SCNC: 141 MMOL/L (ref 136–145)

## 2018-10-04 ENCOUNTER — OFFICE VISIT (OUTPATIENT)
Dept: ORTHOPEDICS | Facility: CLINIC | Age: 68
End: 2018-10-04
Payer: COMMERCIAL

## 2018-10-04 VITALS
SYSTOLIC BLOOD PRESSURE: 156 MMHG | DIASTOLIC BLOOD PRESSURE: 83 MMHG | HEART RATE: 85 BPM | WEIGHT: 154 LBS | BODY MASS INDEX: 27.28 KG/M2

## 2018-10-04 DIAGNOSIS — M79.605 LEFT LEG PAIN: Primary | ICD-10-CM

## 2018-10-04 NOTE — MR AVS SNAPSHOT
After Visit Summary   10/4/2018    Bettina Guerrero    MRN: [id number]           Patient Information     Date Of Birth          1950        Visit Information        Provider Department      10/4/2018 10:40 AM Manav Lopez MD [facility]        Today's Diagnoses     Left leg pain    -  1       Follow-ups after your visit        Your next 10 appointments already scheduled     Oct 05, 2018 11:30 AM CDT   MR LUMBAR SPINE W/O CONTRAST with UCMR1   [facility] ([facility])    [address] [address] [phone]           How do I prepare for my exam? (Food and drink instructions) **If you will be receiving sedation or general anesthesia, please see special notes below.**  How do I prepare for my exam? (Other instructions) Take your medicines as usual, unless your doctor tells you not to. Please remove any body piercings and hair extensions before you arrive. Follow your doctor s orders. If you do not, we may have to postpone your exam. You may or may not receive IV contrast for this exam pending the discretion of the Radiologist.  You do not need to do anything special to prepare. **If you will be receiving sedation or general anesthesia, please see special notes below.**  What should I wear:  The MRI machine uses a strong magnet. Please wear clothes without metal (snaps, zippers). A sweatsuit works well, or we may give you a hospital gown.  How long does the exam take: Most tests take 30 to 60 minutes.  HOWEVER, IF YOUR DOCTOR PRESCRIBES ANESTHESIA please plan on spending four to five hours in the recovery room.  What should I bring: Bring a list of your current medicines to your exam (including vitamins, minerals and over-the-counter drugs). Also bring the results of similar scans you may have had.  Do I need a : **If you will be receiving sedation or general  anesthesia, please see special notes below.**  What should I do after the exam? No Restrictions, You may resume normal activities.  What is this test: MRI (magnetic resonance imaging) uses a strong magnet and radio waves to look inside the body. An MRA (magnetic resonance angiogram) does the same thing, but it lets us look at your blood vessels. A computer turns the radio waves into pictures showing cross sections of the body, much like slices of bread. This helps us see any problems more clearly.  Who should I call with questions: Please call the Imaging Department at your exam site with any questions. Directions, parking instructions, and other information is available on our website, [email or web address].  How do I prepare if I m having sedation or anesthesia? **IMPORTANT** THE INSTRUCTIONS BELOW ARE ONLY FOR THOSE PATIENTS WHO HAVE BEEN TOLD THEY WILL RECEIVE SEDATION OR GENERAL ANESTHESIA DURING THEIR MRI PROCEDURE:  IF YOU WILL RECEIVE SEDATION (take medicine to help you relax during your exam): You must get the medicine from your doctor before you arrive. Bring the medicine to the exam. Do not take it at home. Arrive one hour early. Bring someone who can take you home after the test. Your medicine will make you sleepy. After the exam, you may not drive, take a bus or take a taxi by yourself. No eating 8 hours before your exam. You may have clear liquids up until 4 hours before your exam. (Clear liquids include water, clear tea, black coffee and fruit juice without pulp.)  IF YOU WILL RECEIVE ANESTHESIA (be asleep for your exam): Arrive 1 1/2 hours early. Bring someone who can take you home after the test. You may not drive, take a bus or take a taxi by yourself. No eating 8 hours before your exam. You may have clear liquids up until 4 hours before your exam. (Clear liquids include water, clear tea, black coffee and fruit juice without pulp.) You will spend four to five hours in the recovery room.             Oct 06, 2018  1:00 PM CDT   Return Walk In Ortho with Manav Lopez MD    [facility] ([facility])    [address] [address] [phone]              Future tests that were ordered for you today     Open Future Orders        Priority Expected Expires Ordered    MRI Lumbar spine w/o contrast Routine  10/4/2019 10/4/2018            Who to contact     Please call your clinic at [phone] to:    Ask questions about your health    Make or cancel appointments    Discuss your medicines    Learn about your test results    Speak to your doctor            Additional Information About Your Visit        Care EveryWhere ID     This is your Care EveryWhere ID. This could be used by other organizations to access your [place] medical records  [id number]        Your Vitals Were     Pulse BMI (Body Mass Index)                85 27.28 kg/m2           Blood Pressure from Last 3 Encounters:   10/04/18 156/83   07/31/18 127/62   06/08/18 143/81    Weight from Last 3 Encounters:   10/04/18 69.9 kg (154 lb)   07/31/18 73.5 kg (162 lb)   06/08/18 73.6 kg (162 lb 3.2 oz)               Primary Care Provider Office Phone # Fax #    [facility] BROO[facility] [phone]       [address] [place] [id number]        Equal Access to Services     IVORY FARAH AH: Hadii reginaldo wilsono Sohyun, waaxda luqadaha, qaybta kaalmada christyyamaria t, dany bowers . So [facility] [phone].    ATENCIÓN: Si habla español, tiene a rene disposición servicios gratuitos de asistencia lingüística. Walter al [phone].    We comply with applicable federal civil rights laws and [place] laws. We do not discriminate on the basis of race, color, national origin, age, disability, sex, sexual orientation, or gender identity.            Thank you!     Thank you for choosing  [facility]  for your care. Our goal is always to provide you with excellent care. Hearing back from our patients is one way we can continue to improve our services. Please take a few minutes to complete the written survey that you may receive in the mail after your visit with us. Thank you!             Your Updated Medication List - Protect others around you: Learn how to safely use, store and throw away your medicines at www.disposemymeds.org.          This list is accurate as of 10/4/18 12:03 PM.  Always use your most recent med list.                   Brand Name Dispense Instructions for use Diagnosis    albuterol 108 (90 Base) MCG/ACT inhaler    PROAIR HFA/PROVENTIL HFA/VENTOLIN HFA    1 Inhaler    Inhale 2 puffs into the lungs every 6 hours as needed for shortness of breath / dyspnea or wheezing    Allergic rhinitis due to pollen       ARTHRITIS PAIN RELIEF PO      Take 650 mg by mouth daily Takes 2 tablets daily in am.Christine Escalante LPN 3:00 PM on 6/8/2018        atorvastatin 40 MG tablet    LIPITOR    30 tablet    Take 1 tablet (40 mg) by mouth daily    Hyperlipidemia LDL goal <100       blood glucose monitoring meter device kit    no brand specified    1 kit    Use to test blood sugar 3 times daily or as directed.    Type 2 diabetes mellitus without complication, with long-term current use of insulin (H)       COMFORT LANCETS Misc     100 each    1 each 3 times daily    Type 2 diabetes mellitus without complication, with long-term current use of insulin (H)       glipiZIDE 5 MG 24 hr tablet    GLUCOTROL XL    30 tablet    Take 1 tablet (5 mg) by mouth daily    Type 2 diabetes mellitus with microalbuminuria, unspecified long term insulin use status       hydrochlorothiazide 25 MG tablet    HYDRODIURIL    180 tablet    Take 2 tablets (50 mg) by mouth daily    Benign essential hypertension       insulin glargine 100 UNIT/ML injection    LANTUS SOLOSTAR    18 mL    Inject 18 Units Subcutaneous At Bedtime 18 units at  bedtime.    Type 2 diabetes mellitus without complication, with long-term current use of insulin (H)       loratadine 10 MG tablet    CLARITIN     Take 10 mg by mouth as needed for allergies        polyethylene glycol powder    MIRALAX    119 g    Take 17 g (1 capful) by mouth daily    Slow transit constipation

## 2018-10-04 NOTE — PROGRESS NOTES
SPORTS & ORTHOPEDIC WALK-IN VISIT 10/4/2018    Primary Care Physician: Dr. Lowry  Saw Dr. Hurtado on 7/31/18 for L knee pain. Did also report radiating pain up into the hip and down into the lateral leg at times..XR of L spine on 7/31/18. Has been unable to sleep. Some TTP on L GT. Some shooing pain in gluts as well. L knee Is still bothersome. Got a CSI in L knee- good relief, but now feels like wearing off. Has not done PT yet d/t other health issues.  Had heart attack in August- went to [facility]. Had surgery. Was never able to f/u with .     Reason for visit:     What part of your body is injured / painful?  left hip    What caused the injury /pain? No inciting event     How long ago did your injury occur or pain begin? several months ago    What are your most bothersome symptoms? Pain    How would you characterize your symptom?  sharp    What makes your symptoms better? Nothing    What makes your symptoms worse? Sitting    Have you been previously seen for this problem? Yes,     Medical History:    Any recent changes to your medical history? No    Any new medication prescribed since last visit? No    Have you had surgery on this body part before? No    Social History:    Occupation: Retired    Handedness: Right    Exercise: None    Review of Systems:    Do you have fever, chills, weight loss? No    Do you have any vision problems? No    Do you have any chest pain or edema? No    Do you have any shortness of breath or wheezing?  No    Do you have stomach problems? No    Do you have any numbness or focal weakness? No    Do you have diabetes? Yes, TYpe 2    Do you have problems with bleeding or clotting? On Blood thinners    Do you have an rashes or other skin lesions? No
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
## 2018-10-05 ENCOUNTER — RADIANT APPOINTMENT (OUTPATIENT)
Dept: MRI IMAGING | Facility: CLINIC | Age: 68
End: 2018-10-05
Attending: FAMILY MEDICINE
Payer: COMMERCIAL

## 2018-10-05 DIAGNOSIS — M79.605 LEFT LEG PAIN: ICD-10-CM

## 2018-10-06 ENCOUNTER — OFFICE VISIT (OUTPATIENT)
Dept: ORTHOPEDICS | Facility: CLINIC | Age: 68
End: 2018-10-06
Payer: COMMERCIAL

## 2018-10-06 VITALS
HEIGHT: 63 IN | SYSTOLIC BLOOD PRESSURE: 172 MMHG | WEIGHT: 154 LBS | BODY MASS INDEX: 27.29 KG/M2 | DIASTOLIC BLOOD PRESSURE: 96 MMHG

## 2018-10-06 DIAGNOSIS — M79.605 LEFT LEG PAIN: ICD-10-CM

## 2018-10-06 DIAGNOSIS — R29.898 LEFT LEG WEAKNESS: Primary | ICD-10-CM

## 2018-10-06 RX ORDER — METFORMIN HCL 500 MG
500 TABLET, EXTENDED RELEASE 24 HR ORAL
Status: ON HOLD | COMMUNITY
Start: 2018-08-19 | End: 2022-09-16

## 2018-10-06 RX ORDER — METOPROLOL SUCCINATE 25 MG/1
100 TABLET, EXTENDED RELEASE ORAL
COMMUNITY
End: 2022-09-08

## 2018-10-06 RX ORDER — CLOPIDOGREL BISULFATE 75 MG/1
75 TABLET ORAL
COMMUNITY
End: 2022-09-08

## 2018-10-06 RX ORDER — LISINOPRIL 2.5 MG/1
20 TABLET ORAL
COMMUNITY
Start: 2018-08-31 | End: 2022-09-08

## 2018-10-06 NOTE — PROGRESS NOTES
"[facility]  Orthopedics  Manav Lopez MD  10/07/2018     Name: Bettina Guerrero  MRN: [id number]  Age: 68 year old  : 1950  Referring provider: Referred Self     Chief Complaint: Left hip pain      Date of Injury: 2018     History of Present Illness:   Bettina Guerrero is a 68 year old female with diabetes mellitus who presents today for follow up of lumbar spine MRI and left leg pain. Symptoms unchanged from previous visit.  Continues to report pain in left lateral hip that radiates down the anterior left thigh over the front of the knee.  Also radiates up to the lumbar area around the back.  Worse with walking and movement.    XR Lumbar spine, 2-3 views on 18:  Impression:  1.  No acute osseous abnormality.  2.  Multilevel mild degenerative changes.  Per radiology report      Current medications, allergies, and past medical history are reviewed in electronic medical record as appropriate.    Physical Examination:  Blood pressure (!) 172/96, height 1.6 m (5' 3\"), weight 69.9 kg (154 lb), not currently breastfeeding.    General: Normal appearance, in no obvious distress.  Skin: No ecchymosis, erythema, warmth, induration, or obvious rash.  Psych: Interactive, appropriate, normal mood and affect.   Musculoskeletal: weakness in hip flexion and knee extension on the left. Asymmetric. Atrophy of quad on left compared to right. Some ttp over greater trochanter. Knee flexion, ankle dorsiflexion, extension symmetric.     Imaging:   MRI lumbar spine dated 10/5/2018 and report per radiology:  On a level by level basis:     T12-L1: No spinal canal or neuroforaminal stenosis.     L1-2: Disc bulge. Mild bilateral facet arthropathy. Mild neural  foraminal narrowing left greater than right.     L2-3: Disc bulge. No significant spinal canal or neural foraminal  narrowing. Mild bilateral facet arthropathy.     L3-4: Disc bulge with protrusion into the lateral recesses  bilaterally. Mild spinal canal " narrowing. Moderate bilateral facet  arthropathy and extension of the disc bulge resulting in mild left and  mild-to-moderate right     L4-5: Disc bulge most prominent lesion in both lateral recess. Mild  bilateral facet arthropathy and extension of the disc bulge causing  mild to moderate right and mild left foraminal stenosis.      L5-S1: Mild bilateral neural foraminal narrowing. No significant  spinal canal narrowing.    Assessment:   68 year old female with lower left back pain radiating into the lower extremity. Also having significant weakness in the left leg.     Plan:   Based on MRI results as well as clinical symptoms, plan to try VALERIO at L3/L4. Should begin PT for weakness as well  If VALERIO ineffective, we should investigate further with EMG.     Manav Lopez MD    Scribe Disclosure:   I, Leeroy Alvarez, am serving as a scribe to document services personally performed by Manav Lopez MD at this visit, based upon the provider's statements to me. All documentation has been reviewed by the aforementioned provider prior to being entered into the official medical record.

## 2018-10-06 NOTE — MR AVS SNAPSHOT
"              After Visit Summary   10/6/2018    Bettina Guerrero    MRN: [id number]           Patient Information     Date Of Birth          1950        Visit Information        Provider Department      10/6/2018 1:00 PM Manav Lopez MD [facility]        Today's Diagnoses     Left leg weakness    -  1    Left leg pain           Follow-ups after your visit        Future tests that were ordered for you today     Open Future Orders        Priority Expected Expires Ordered    X-ray Translaminar lumbar single inj Routine 10/6/2018 10/6/2019 10/6/2018    EMG Routine  12/5/2018 10/6/2018            Who to contact     Please call your clinic at [phone] to:    Ask questions about your health    Make or cancel appointments    Discuss your medicines    Learn about your test results    Speak to your doctor            Additional Information About Your Visit        Care EveryWhere ID     This is your Care EveryWhere ID. This could be used by other organizations to access your [facility] medical records  [id number]        Your Vitals Were     Height BMI (Body Mass Index)                1.6 m (5' 3\") 27.28 kg/m2           Blood Pressure from Last 3 Encounters:   10/06/18 (!) 172/96   10/04/18 156/83   07/31/18 127/62    Weight from Last 3 Encounters:   10/06/18 69.9 kg (154 lb)   10/04/18 69.9 kg (154 lb)   07/31/18 73.5 kg (162 lb)                 Today's Medication Changes          These changes are accurate as of 10/6/18  2:14 PM.  If you have any questions, ask your nurse or doctor.               Stop taking these medicines if you haven't already. Please contact your care team if you have questions.     hydrochlorothiazide 25 MG tablet   Commonly known as:  HYDRODIURIL           insulin glargine 100 UNIT/ML injection   Commonly known as:  LANTUS SOLPRATIMA                    Primary Care Provider Office Phone # Fax #    BROOKE Heredia [phone] " [phone]       [address] [facility] [address]        Equal Access to Services     IVORY FARAH : Hadii aad ku hadnoemyjermaine Sohyun, waclarenceda luqadaha, qaybta kabessyda angelafavio, dany fariha janellmemo milleroscar benjamínalirezasoumya reddy. So [facility] [phone].    ATENCIÓN: Si habla español, tiene a rene disposición servicios gratuitos de asistencia lingüística. Llame al [phone].    We comply with applicable federal civil rights laws and [place] laws. We do not discriminate on the basis of race, color, national origin, age, disability, sex, sexual orientation, or gender identity.            Thank you!     Thank you for choosing [facility]  for your care. Our goal is always to provide you with excellent care. Hearing back from our patients is one way we can continue to improve our services. Please take a few minutes to complete the written survey that you may receive in the mail after your visit with us. Thank you!             Your Updated Medication List - Protect others around you: Learn how to safely use, store and throw away your medicines at www.disposemymeds.org.          This list is accurate as of 10/6/18  2:14 PM.  Always use your most recent med list.                   Brand Name Dispense Instructions for use Diagnosis    albuterol 108 (90 Base) MCG/ACT inhaler    PROAIR HFA/PROVENTIL HFA/VENTOLIN HFA    1 Inhaler    Inhale 2 puffs into the lungs every 6 hours as needed for shortness of breath / dyspnea or wheezing    Allergic rhinitis due to pollen       ARTHRITIS PAIN RELIEF PO      Take 650 mg by mouth daily Takes 2 tablets daily in am.Christine Escalante LPN 3:00 PM on 6/8/2018        atorvastatin 40 MG tablet    LIPITOR    30 tablet    Take 1 tablet (40 mg) by mouth daily    Hyperlipidemia LDL goal <100       blood glucose monitoring meter device kit    no brand specified    1 kit    Use to test blood sugar 3 times daily or as directed.    Type 2 diabetes mellitus without  complication, with long-term current use of insulin (H)       clopidogrel 75 MG tablet    PLAVIX     Take 75 mg by mouth        COMFORT LANCETS Misc     100 each    1 each 3 times daily    Type 2 diabetes mellitus without complication, with long-term current use of insulin (H)       glipiZIDE 5 MG 24 hr tablet    GLUCOTROL XL    30 tablet    Take 1 tablet (5 mg) by mouth daily    Type 2 diabetes mellitus with microalbuminuria, unspecified long term insulin use status       lisinopril 2.5 MG tablet    PRINIVIL/Zestril          loratadine 10 MG tablet    CLARITIN     Take 10 mg by mouth as needed for allergies        metFORMIN 500 MG 24 hr tablet    GLUCOPHAGE-XR          metoprolol succinate 25 MG 24 hr tablet    TOPROL-XL     Take 25 mg by mouth        polyethylene glycol powder    MIRALAX    119 g    Take 17 g (1 capful) by mouth daily    Slow transit constipation

## 2018-10-06 NOTE — LETTER
"10/6/2018       RE: Bettina Guerrero  [address]  [place] [address]     Dear Colleague,    Thank you for referring your patient, Bettina Guerrero, to the [facility] at [facility]. Please see a copy of my visit note below.    [facility]  Orthopedics  Manav Lopez MD  10/07/2018     Name: Bettina Guerrero  MRN: [id number]  Age: 68 year old  : 1950  Referring provider: Referred Self     Chief Complaint: Left hip pain      Date of Injury: 2018     History of Present Illness:   Bettina Guerrero is a 68 year old female with diabetes mellitus who presents today for follow up of lumbar spine MRI and left leg pain. Symptoms unchanged from previous visit.  Continues to report pain in left lateral hip that radiates down the anterior left thigh over the front of the knee.  Also radiates up to the lumbar area around the back.  Worse with walking and movement.    XR Lumbar spine, 2-3 views on 18:  Impression:  1.  No acute osseous abnormality.  2.  Multilevel mild degenerative changes.  Per radiology report      Current medications, allergies, and past medical history are reviewed in electronic medical record as appropriate.    Physical Examination:  Blood pressure (!) 172/96, height 1.6 m (5' 3\"), weight 69.9 kg (154 lb), not currently breastfeeding.    General: Normal appearance, in no obvious distress.  Skin: No ecchymosis, erythema, warmth, induration, or obvious rash.  Psych: Interactive, appropriate, normal mood and affect.   Musculoskeletal: weakness in hip flexion and knee extension on the left. Asymmetric. Atrophy of quad on left compared to right. Some ttp over greater trochanter. Knee flexion, ankle dorsiflexion, extension symmetric.     Imaging:   MRI lumbar spine dated 10/5/2018 and report per radiology:  On a level by level basis:     T12-L1: No spinal canal or neuroforaminal " stenosis.     L1-2: Disc bulge. Mild bilateral facet arthropathy. Mild neural  foraminal narrowing left greater than right.     L2-3: Disc bulge. No significant spinal canal or neural foraminal  narrowing. Mild bilateral facet arthropathy.     L3-4: Disc bulge with protrusion into the lateral recesses  bilaterally. Mild spinal canal narrowing. Moderate bilateral facet  arthropathy and extension of the disc bulge resulting in mild left and  mild-to-moderate right     L4-5: Disc bulge most prominent lesion in both lateral recess. Mild  bilateral facet arthropathy and extension of the disc bulge causing  mild to moderate right and mild left foraminal stenosis.      L5-S1: Mild bilateral neural foraminal narrowing. No significant  spinal canal narrowing.    Assessment:   68 year old female with lower left back pain radiating into the lower extremity. Also having significant weakness in the left leg.     Plan:   Based on MRI results as well as clinical symptoms, plan to try VALERIO at L3/L4. Should begin PT for weakness as well  If VALERIO ineffective, we should investigate further with EMG.     Scribe Disclosure:   I, Leeroy Alvarez, am serving as a scribe to document services personally performed by Manav Lopez MD at this visit, based upon the provider's statements to me. All documentation has been reviewed by the aforementioned provider prior to being entered into the official medical record.     Again, thank you for allowing me to participate in the care of your patient.      Sincerely,    Manav Lopez MD

## 2018-11-01 ENCOUNTER — OFFICE VISIT (OUTPATIENT)
Dept: NEUROLOGY | Facility: CLINIC | Age: 68
End: 2018-11-01
Payer: COMMERCIAL

## 2018-11-01 DIAGNOSIS — M79.605 LEFT LEG PAIN: ICD-10-CM

## 2018-11-01 DIAGNOSIS — R29.898 LEFT LEG WEAKNESS: ICD-10-CM

## 2018-11-01 NOTE — PROGRESS NOTES
"    [facility]  Electrodiagnostic Laboratory    Nerve Conduction & EMG Report          Patient:       Bettina Guerrero  Patient ID:    [id number]  Gender:        Female  YOB: 1950  Age:           68 Years 9 Months        History & Examination: This is a 68-year-old female who developed severe left leg weakness in August 2018.  She is on anticoagulation.  Examination shows severe left psoas and quadriceps weakness.  She has pain in her left hip and thigh region but no dysesthesia.      Techniques: Motor and sensory conduction studies were done with surface recording electrodes. EMG was done with a concentric needle electrode.        Results:  the left superficial peroneal sensory nerve action potential is absent.  The sural sensory responses were normal bilaterally.  The median sensory nerve action potential was reduced in amplitude.  The radial sensory nerve action potential was borderline in amplitude.  The  bilateral peroneal and left tibial compound motor action potentials were reduced in amplitude.  The left tibial motor conduction velocity was slowed.  The left median compound motor action potential was reduced in amplitude.  Needle exam was limited to selected muscles given the patient's full anticoagulation status.  Examination sites were carefully monitored for bleeding.  Long duration motor unit potentials were seen in distal leg muscles.  In left L2 through 4 innervated muscles intense fibrillation was seen with polyphasic motor unit potentials and reduced recruitment.  The left L3 paraspinal region was carefully evaluated for fibrillation and none was seen.  The left iliopsoas was not tested because of the anticoagulation status.      Interpretation: The EMG is abnormal.  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  There is also evidence for a generalized " sensorimotor peripheral neuropathy        EMG Physician: Luis Angel Zuñiga MD         Sensory NCS      Nerve / Sites Rec. Site Onset Peak Ref. NP Amp Ref. PP Amp Dist Kneneth Ref. Temp     ms ms ms  V  V  V cm m/s m/s  C   L MEDIAN - Dig II Anti      Wrist Dig II 2.45 3.07  8.4 10.0 12.6 12 49.0 48.0 31.2   L ULNAR - Dig V Anti      Wrist Dig V 2.29 2.97  11.3 8.0 13.4 12.5 54.5 48.0 32   L RADIAL - Snuff      Forearm Snuff 1.46 2.29  16.0 15.0 21.6 9 61.7 48.0 30.8      Forearm Snuff 1.67 2.34 3.00 15.0  25.6 9 54.0  30.7   L SURAL - Lat Mall 60      Calf Ankle 3.13 4.53  5.1 5.0 5.7 14 44.8 38.0 30.6      Calf Ankle 2.71 4.38  5.2 5.0 3.7 14 51.7  30.6   R SURAL - Lat Mall 60      Calf Ankle 2.92 4.53  5.6 5.0 7.5 14 48.0 38.0 29.7      Calf Ankle 2.86 4.32  5.9 5.0 4.0 14 48.9  29.7   L SUP PERONEAL      Lat Leg Ochoa NR NR  NR  NR 12.5 NR 38.0 30.7      Lat Leg Ochoa   4.50    12.5   30.7             Motor NCS      Nerve / Sites Rec. Site Lat Ref. Amp Ref. Rel Amp Dist Kenneth Ref. Dur. Area Temp.     ms ms mV mV % cm m/s m/s ms %  C   L MEDIAN - APB      Wrist APB 4.43 4.40 4.6 5.0 100 8   5.89 100 33      Elbow APB 8.96  4.0  86.6 20 44.1 48.0 6.15 92.6 33.1   L ULNAR - ADM      Wrist ADM 2.86 3.50 6.8 5.0 100 8   6.51 100 30.6      B.Elbow ADM 6.98  4.9  71.4 22 53.5 48.0 6.82 81.7 29.7      A.Elbow ADM 8.54  4.6  67.5 9 57.6 48.0 6.56 80 29.2   L DEEP PERONEAL - EDB 60      Ankle EDB 5.00 6.00 0.3 2.0 100 8   7.45  30.7      FibHead EDB 13.65  0.2  91.2 33 38.2 38.0 7.08  30.7      Pop Fos EDB 15.73  0.2  94.2 8 38.4 38.0 7.08  30.7   R TIBIAL - AH      Ankle AH 4.22 6.00 3.5 2.0 100 8   7.97 100 30.3      Pop Fos AH 13.13  2.1  61.1 35 39.3 38.0 8.02 97.1 30.2   R DEEP PERONEAL - EDB 60      Ankle EDB NR 6.00 NR 2.0 NR 8   NR NR 30.2   L TIBIAL - AH      Ankle AH 4.27 6.00 2.2 4.0 100 8   6.04 100 31      Pop Fos AH 14.48  0.6  25.6 36 35.3 38.0 8.96 59.7 30.8       F  Wave      Nerve Min F Lat Max F Lat Mean FLat  Temp.    ms ms ms  C   L TIBIAL 53.91 57.34 55.42 30.8       EMG Summary Table     Spontaneous MUAP Recruitment    IA Fib/PSW Fasc H.F. Amp Dur. PPP Pattern   L. VAST LATERALIS Increased 2+ None None 1+ 1+ 1+ Moderately Reduced   L. ADD LONGUS Increased 3+ None None N N N Severly reduced   L. TIB ANTERIOR N None None None 1+ 1+ 1+ N   L. GASTROCN (MED) N None None None 1+ 1+ N N   R. VAST LATERALIS N None None None N N 1+ N   L. FIRST D INTEROSS N None None None N N N N   L. L3 PSP N None None None N N N N

## 2018-11-01 NOTE — MR AVS SNAPSHOT
After Visit Summary   11/1/2018    Bettina Guerrero    MRN: [id number]           Patient Information     Date Of Birth          1950        Visit Information        Provider Department      11/1/2018 8:30 AM Luis Angel Zuñiga MD [facility] EMG        Today's Diagnoses     Left leg weakness        Left leg pain           Follow-ups after your visit        Your next 10 appointments already scheduled     Nov 14, 2018  9:00 AM CST   XR LUMBAR TRANSLAMINAR INJECTION with JACKIEXR3,  IMAGING NURSE,  NEURO RAD   [facility] X[facility] ([facility])    [address] [place] [id number] [phone]           How do I prepare for my exam? (Food and drink instructions) No Food and Drink Restrictions.  How do I prepare for my exam? (Other instructions) * If you take Coumadin (or any other blood thinners) you may need to stop taking them up to 5 days prior to the exam. Talk to your doctor before stopping. * If you take Plavix, Ticlid, Pletal or Persantine, please ask your doctor if you should stop these medicines. You may need extra tests on the morning of your scan. * If you take Xarelto (Rivaroxaban), you may need to stop taking it 24 hours before treatment. Talk to your doctor before stopping this medicine.  What should I wear: Wear comfortable clothes.  How long does the exam take: Injections take about 30 to 45 minutes. Most people spend up to 2 hours in the clinic or hospital.  What should I bring: Please bring any scans or X-rays taken at other hospitals, if similar tests were done. Also bring a list of your medicines, including vitamins, minerals and over-the-counter drugs. It is safest to leave personal items at home. Do I need a :  Plan to have someone drive you home afterward.  What do I need to tell my doctor: Tell your doctor in advance: * If you are allergic to X-ray dye (contrast fluid). * If you may be pregnant.  What should  I do after the exam: You may have slight cramping during this exam. The cramps should go away afterward. You may have some spotting after the exam.  What is this test: A spinal shot is done in or near the spine. You may receive steroid medicine (to reduce swelling) or contrast fluid (dye that makes the area show up more clearly on X-rays). A nerve root shot is a shot into the nerve near the spine. It may reduce inflammation near the nerve root or spinal cord and reduce pain in the arm or leg.  Who should I call with questions: If you have any questions, please call the Imaging Department where you will have your exam. Directions, parking instructions, and other information is available on our website, [email or web address].              Future tests that were ordered for you today     Open Future Orders        Priority Expected Expires Ordered    Needle EMG Each Extremity w/Paraspinal Area Limited (617[address]) Routine  11/1/2019 11/1/2018            Who to contact     Please call your clinic at [phone] to:    Ask questions about your health    Make or cancel appointments    Discuss your medicines    Learn about your test results    Speak to your doctor            Additional Information About Your Visit        Care EveryWhere ID     This is your Care EveryWhere ID. This could be used by other organizations to access your [facility] medical records  [id number]         Blood Pressure from Last 3 Encounters:   10/06/18 (!) 172/96   10/04/18 156/83   07/31/18 127/62    Weight from Last 3 Encounters:   10/06/18 69.9 kg (154 lb)   10/04/18 69.9 kg (154 lb)   07/31/18 73.5 kg (162 lb)              We Performed the Following     EMG     HC NCS MOTOR W OR W/O F-WAVE, 11 OR 12        Primary Care Provider Office Phone # Fax #    BROOKE Heredia [facility] [phone]       [address] [facility] [address]        Equal Access to Services     IVORY FARAH : jocelin Medeiros,  dajuan oneill angeladany stahl tylerin hayaan adeeg kharash la'aan ah. So [facility] [phone].    ATENCIÓN: Si zack peck, tiene a rene disposición servicios gratuitos de asistencia lingüística. Walter al [phone].    We comply with applicable federal civil rights laws and [place] laws. We do not discriminate on the basis of race, color, national origin, age, disability, sex, sexual orientation, or gender identity.            Thank you!     Thank you for choosing [facility]  for your care. Our goal is always to provide you with excellent care. Hearing back from our patients is one way we can continue to improve our services. Please take a few minutes to complete the written survey that you may receive in the mail after your visit with us. Thank you!             Your Updated Medication List - Protect others around you: Learn how to safely use, store and throw away your medicines at www.disposemymeds.org.          This list is accurate as of 11/1/18  9:20 AM.  Always use your most recent med list.                   Brand Name Dispense Instructions for use Diagnosis    albuterol 108 (90 Base) MCG/ACT inhaler    PROAIR HFA/PROVENTIL HFA/VENTOLIN HFA    1 Inhaler    Inhale 2 puffs into the lungs every 6 hours as needed for shortness of breath / dyspnea or wheezing    Allergic rhinitis due to pollen       ARTHRITIS PAIN RELIEF PO      Take 650 mg by mouth daily Takes 2 tablets daily in am.Christine Escalante LPN 3:00 PM on 6/8/2018        atorvastatin 40 MG tablet    LIPITOR    30 tablet    Take 1 tablet (40 mg) by mouth daily    Hyperlipidemia LDL goal <100       blood glucose monitoring meter device kit    no brand specified    1 kit    Use to test blood sugar 3 times daily or as directed.    Type 2 diabetes mellitus without complication, with long-term current use of insulin (H)       clopidogrel 75 MG tablet    PLAVIX     Take 75 mg by mouth        COMFORT LANCETS Misc     100 each    1 each 3 times daily    Type 2  diabetes mellitus without complication, with long-term current use of insulin (H)       glipiZIDE 5 MG 24 hr tablet    GLUCOTROL XL    30 tablet    Take 1 tablet (5 mg) by mouth daily    Type 2 diabetes mellitus with microalbuminuria, unspecified long term insulin use status       lisinopril 2.5 MG tablet    PRINIVIL/Zestril          loratadine 10 MG tablet    CLARITIN     Take 10 mg by mouth as needed for allergies        metFORMIN 500 MG 24 hr tablet    GLUCOPHAGE-XR          metoprolol succinate 25 MG 24 hr tablet    TOPROL-XL     Take 25 mg by mouth        polyethylene glycol powder    MIRALAX    119 g    Take 17 g (1 capful) by mouth daily    Slow transit constipation

## 2018-11-01 NOTE — LETTER
11/1/2018       RE: Bettina Guerrero  [address]  [place] [address]     Dear Colleague,    Thank you for referring your patient, Bettina Guerrero, to the [facility] EMG at [facility]. Please see a copy of my visit note below.        [facility]  Electrodiagnostic Laboratory    Nerve Conduction & EMG Report          Patient:       Bettina Guerrero  Patient ID:    [id number]  Gender:        Female  YOB: 1950  Age:           68 Years 9 Months        History & Examination: This is a 68-year-old female who developed severe left leg weakness in August 2018.  She is on anticoagulation.  Examination shows severe left psoas and quadriceps weakness.  She has pain in her left hip and thigh region but no dysesthesia.      Techniques: Motor and sensory conduction studies were done with surface recording electrodes. EMG was done with a concentric needle electrode.        Results:  the left superficial peroneal sensory nerve action potential is absent.  The sural sensory responses were normal bilaterally.  The median sensory nerve action potential was reduced in amplitude.  The radial sensory nerve action potential was borderline in amplitude.  The  bilateral peroneal and left tibial compound motor action potentials were reduced in amplitude.  The left tibial motor conduction velocity was slowed.  The left median compound motor action potential was reduced in amplitude.  Needle exam was limited to selected muscles given the patient's full anticoagulation status.  Examination sites were carefully monitored for bleeding.  Long duration motor unit potentials were seen in distal leg muscles.  In left L2 through 4 innervated muscles intense fibrillation was seen with polyphasic motor unit potentials and reduced recruitment.  The left L3 paraspinal region was carefully evaluated for fibrillation and none was seen.  The left iliopsoas was not  "tested because of the anticoagulation status.      Interpretation: The EMG is abnormal.  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  There is also evidence for a generalized sensorimotor peripheral neuropathy        EMG Physician: Luis Angel Zuñiga MD         Sensory NCS      Nerve / Sites Rec. Site Onset Peak Ref. NP Amp Ref. PP Amp Dist Kenneth Ref. Temp     ms ms ms  V  V  V cm m/s m/s  C   L MEDIAN - Dig II Anti      Wrist Dig II 2.45 3.07  8.4 10.0 12.6 12 49.0 48.0 31.2   L ULNAR - Dig V Anti      Wrist Dig V 2.29 2.97  11.3 8.0 13.4 12.5 54.5 48.0 32   L RADIAL - Snuff      Forearm Snuff 1.46 2.29  16.0 15.0 21.6 9 61.7 48.0 30.8      Forearm Snuff 1.67 2.34 3.00 15.0  25.6 9 54.0  30.7   L SURAL - Lat Mall 60      Calf Ankle 3.13 4.53  5.1 5.0 5.7 14 44.8 38.0 30.6      Calf Ankle 2.71 4.38  5.2 5.0 3.7 14 51.7  30.6   R SURAL - Lat Mall 60      Calf Ankle 2.92 4.53  5.6 5.0 7.5 14 48.0 38.0 29.7      Calf Ankle 2.86 4.32  5.9 5.0 4.0 14 48.9  29.7   L SUP PERONEAL      Lat Leg Ochoa NR NR  NR  NR 12.5 NR 38.0 30.7      Lat Leg Ochoa   4.50    12.5   30.7             Motor NCS      Nerve / Sites Rec. Site Lat Ref. Amp Ref. Rel Amp Dist Kenneth Ref. Dur. Area Temp.     ms ms mV mV % cm m/s m/s ms %  C   L MEDIAN - APB      Wrist APB 4.43 4.40 4.6 5.0 100 8   5.89 100 33      Elbow APB 8.96  4.0  86.6 20 44.1 48.0 6.15 92.6 33.1   L ULNAR - ADM      Wrist ADM 2.86 3.50 6.8 5.0 100 8   6.51 100 30.6      B.Elbow ADM 6.98  4.9  71.4 22 53.5 48.0 6.82 81.7 29.7      A.Elbow ADM 8.54  4.6  67.5 9 57.6 48.0 6.56 80 29.2   L DEEP PERONEAL - EDB 60      Ankle EDB 5.00 6.00 0.3 2.0 100 8   7.45  30.7      FibHead EDB 13.65  0.2  91.2 33 38.2 38.0 7.08  30.7      Pop Fos EDB 15.73  0.2  94.2 8 38.4 38.0 7.08  30.7   R TIBIAL - AH      Ankle AH 4.22 6.00 3.5 2.0 100 8   7.97 100 30.3      Pop Fos AH 13.13  2.1  61.1 " 35 39.3 38.0 8.02 97.1 30.2   R DEEP PERONEAL - EDB 60      Ankle EDB NR 6.00 NR 2.0 NR 8   NR NR 30.2   L TIBIAL - AH      Ankle AH 4.27 6.00 2.2 4.0 100 8   6.04 100 31      Pop Fos AH 14.48  0.6  25.6 36 35.3 38.0 8.96 59.7 30.8       F  Wave      Nerve Min F Lat Max F Lat Mean FLat Temp.    ms ms ms  C   L TIBIAL 53.91 57.34 55.42 30.8       EMG Summary Table     Spontaneous MUAP Recruitment    IA Fib/PSW Fasc H.F. Amp Dur. PPP Pattern   L. VAST LATERALIS Increased 2+ None None 1+ 1+ 1+ Moderately Reduced   L. ADD LONGUS Increased 3+ None None N N N Severly reduced   L. TIB ANTERIOR N None None None 1+ 1+ 1+ N   L. GASTROCN (MED) N None None None 1+ 1+ N N   R. VAST LATERALIS N None None None N N 1+ N   L. FIRST D INTEROSS N None None None N N N N   L. L3 PSP N None None None N N N N                                      Again, thank you for allowing me to participate in the care of your patient.      Sincerely,    Luis Angel Zuñiga MD

## 2018-11-14 ENCOUNTER — OFFICE VISIT (OUTPATIENT)
Dept: ORTHOPEDICS | Facility: CLINIC | Age: 68
End: 2018-11-14
Payer: COMMERCIAL

## 2018-11-14 DIAGNOSIS — M54.16 LUMBAR BACK PAIN WITH RADICULOPATHY AFFECTING LEFT LOWER EXTREMITY: Primary | ICD-10-CM

## 2018-11-14 RX ORDER — NAPROXEN 500 MG/1
500 TABLET ORAL 2 TIMES DAILY WITH MEALS
Qty: 20 TABLET | Refills: 0 | Status: SHIPPED | OUTPATIENT
Start: 2018-11-14 | End: 2022-09-08

## 2018-11-14 RX ORDER — TRAMADOL HYDROCHLORIDE 50 MG/1
50 TABLET ORAL EVERY 6 HOURS PRN
Qty: 10 TABLET | Refills: 0 | Status: SHIPPED | OUTPATIENT
Start: 2018-11-14 | End: 2022-09-08

## 2018-11-14 NOTE — PROGRESS NOTES
SPORTS & ORTHOPEDIC WALK-IN FOLLOW-UP VISIT 11/14/2018    Interval History:     Follow up reason: LBP, L leg weakness    Date of injury: No event. Noticed around    Date last seen: 10/6/18    Following Therapeutic Plan: Yes     Pain: Worsening    Function: Worsening    Interval History: Was suppose to have Lumbar VALERIO today, but never received letter in mail that she was suppose to stop taking Plavix 5 days prior to procedure. Rescheduled for 11/21/18. Would like to discuss pain mgmt in the mean time. Has taken Naproxen 500mg which has helped.     Medical History:    Any recent changes to your medical history? No    Any new medication prescribed since last visit? No    Review of Systems:    Do you have fever, chills, weight loss? No    Do you have any vision problems? No    Do you have any chest pain or edema? No    Do you have any shortness of breath or wheezing?  No    Do you have stomach problems? No    Do you have any numbness or focal weakness? No    Do you have diabetes? Yes, Type 2    Do you have problems with bleeding or clotting? On blood thinners    Do you have an rashes or other skin lesions? No

## 2018-11-14 NOTE — LETTER
2018       RE: Bettina Guerrero  [address]  [place] [address]     Dear Colleague,    Thank you for referring your patient, Bettina Guerrero, to the [facility] SPORTS AND ORTHOPAEDIC WALK IN CLINIC at [facility]. Please see a copy of my visit note below.    [facility]  Orthopedics  Zackery. Genesis, DO  2018     Name: Bettina Guerrero  MRN: [id number]  Age: 68 year old  : 1950  Referring provider: No ref. provider found     Chief Complaint: left hip pain and left lower extremity pain and weakness    History of Present Illness:   Bettina Guerrero is a 68 year old  female with diabetes mellitus who presents today for follow-up regarding left hip pain and left lower extremity pain and weakness. She was last seen by Dr. Lopez on 10/6/2018, at which point she was experiencing this same issue. Dr. Lopez recommended a epidural steroid injection at L3/L4.    She was also seen in the emergency room on 10/11/2018 for this issue. She was given aspirin and hydrocodone-acetaminophen at this visit. She reports this provided no continuing relief of her pain.    She had an EMG done here on . She was supposed to have her epidural steroid injection performed today, but she did not stop taking her Plavix so this procedure was rescheduled. She reports this occurred because she didn't receive a letter telling her to stop taking this medication. This injection has been rescheduled for the .      Today, she continues to have constant hip pain around the full left side of her hip. This pain also radiates throughout her whole left leg. Her left knee hurts as well. The patient also has a burning pain in her left anterior thigh. She also has weakness in her left leg. She has to pull on her sock to lift her leg onto her bed due to this leg weakness.    She reports that at a dose of 375 mg of Naproxen she has pain and  difficulty sleeping, but at 500 mg Naproxen she is able to sleep. She has no stomach issues when taking Naproxen. She uses a heating pad to help get to sleep.     Of note, the patient reports that she reacts to Percocet with itching but no systemic allergy.     Review of Systems:   A 10-point review of systems was obtained and is negative except for as noted in the HPI.   (11/14/2018)  CONSTITUTIONAL: Denies fever and weight loss  EYES: Denies acute vision changes  ENT: Denies hearing changes or difficulty swallowing  CARDIAC: Denies edema  RESPIRATORY: Denies dyspnea, cough or wheeze  GASTROINTESTINAL: Denies abdominal pain, nausea, vomiting  MUSCULOSKELETAL: See HPI  SKIN: Denies any recent rash or lesion  NEUROLOGICAL: Denies numbness  PSYCHIATRIC: No history of psychiatric symptoms or problems  ENDOCRINE: Diagnosis of diabetes:  Lab Results   Component Value Date    A1C 12.4 06/08/2018    A1C 12.8 08/10/2017    A1C 9.4 06/14/2016    A1C 10.2 04/05/2016    A1C 9.3 09/29/2015     HEMATOLOGY: Denies episodes of easy bleeding     Physical Examination:  not currently breastfeeding.  General  - normal appearance, in no obvious distress  CV  - normal femoral pulse  Pulm  - normal respiratory pattern, non-labored  Musculoskeletal - Left hip  - stance: Antalgic gait, ambulating with cane  - inspection: no swelling or effusion,  Increased thoracic kyphosis- palpation: no posterior, lateral or anterior hip tenderness  - ROM: normal flexion, extension, IR, abduction, adduction, no crepitus. Right rotation causes discomfort in the left low back with radiation  - strength:   Hip flexion is 4/5  Knee extension is 4/5  Knee flexion 4+/5  Bilateral ankle internal rotation 5/5    Ankle plantar flexion and dorsiflexion 5/5   Neuro  - no motor deficit, grossly normal coordination, normal muscle tone. Sensation intact in both legs.  Skin  - no ecchymosis, erythema, warmth, or induration, no obvious rash  Psych  - interactive,  appropriate, normal mood and affect    Assessment:   68 year old female with continuing left hip pain and left lower extremity pain and weakness. She was holding on for VALERIO today, but because she continued to take plavix, scheduled one week out. Will provide short supply of medications to last until next week.      Plan:   - Prescription for Naproxen 500 mg BID.  - Tramadol for breakthrough pain, small rx  - Continue using heating pad  - Plan for epidural steroid injection on 11/21/2018. Hold Plavix for this injection 5 days prior.  - Follow up with Dr. Lopez 2 weeks after VALERIO.    It was a pleasure seeing Bettina today.    Jae Hurtado DO, [facility]  Primary Care Sports Medicine    IJae DO, have reviewed the above note and agree with the scribe's notation as written.    Scribe Disclosure:   IJoes F, am serving as a scribe to document services personally performed by Jae Hurtado DO at this visit, based upon the provider's statements to me. All documentation has been reviewed by the aforementioned provider prior to being entered into the official medical record.          SPORTS & ORTHOPEDIC WALK-IN FOLLOW-UP VISIT 11/14/2018    Interval History:     Follow up reason: LBP, L leg weakness    Date of injury: No event. Noticed around    Date last seen: 10/6/18    Following Therapeutic Plan: Yes     Pain: Worsening    Function: Worsening    Interval History: Was suppose to have Lumbar VALERIO today, but never received letter in mail that she was suppose to stop taking Plavix 5 days prior to procedure. Rescheduled for 11/21/18. Would like to discuss pain mgmt in the mean time. Has taken Naproxen 500mg which has helped.     Medical History:    Any recent changes to your medical history? No    Any new medication prescribed since last visit? No    Review of Systems:    Do you have fever, chills, weight loss? No    Do you have any vision problems? No    Do you have any chest pain or edema? No    Do you have  any shortness of breath or wheezing?  No    Do you have stomach problems? No    Do you have any numbness or focal weakness? No    Do you have diabetes? Yes, Type 2    Do you have problems with bleeding or clotting? On blood thinners    Do you have an rashes or other skin lesions? No             Again, thank you for allowing me to participate in the care of your patient.      Sincerely,    Jae Hurtado, DO

## 2018-11-14 NOTE — MR AVS SNAPSHOT
After Visit Summary   11/14/2018    Bettina Guerrero    MRN: [id number]           Patient Information     Date Of Birth          1950        Visit Information        Provider Department      11/14/2018 9:30 AM Jae Hurtado DO [facility]opa[facility]        Today's Diagnoses     Lumbar back pain with radiculopathy affecting left lower extremity    -  1       Follow-ups after your visit        Your next 10 appointments already scheduled     Nov 21, 2018 11:30 AM CST   XR LUMBAR TRANSLAMINAR INJECTION with UCXR3,  IMAGING NURSE,  NEURO RAD   [facility] ([facility])    [address]  1st Floor  [facility] [address] [phone]           How do I prepare for my exam? (Food and drink instructions) No Food and Drink Restrictions.  How do I prepare for my exam? (Other instructions) * If you take Coumadin (or any other blood thinners) you may need to stop taking them up to 5 days prior to the exam. Talk to your doctor before stopping. * If you take Plavix, Ticlid, Pletal or Persantine, please ask your doctor if you should stop these medicines. You may need extra tests on the morning of your scan. * If you take Xarelto (Rivaroxaban), you may need to stop taking it 24 hours before treatment. Talk to your doctor before stopping this medicine.  What should I wear: Wear comfortable clothes.  How long does the exam take: Injections take about 30 to 45 minutes. Most people spend up to 2 hours in the clinic or hospital.  What should I bring: Please bring any scans or X-rays taken at other hospitals, if similar tests were done. Also bring a list of your medicines, including vitamins, minerals and over-the-counter drugs. It is safest to leave personal items at home. Do I need a :  Plan to have someone drive you home afterward.  What do I need to tell my doctor: Tell your doctor in advance: * If you are allergic to  X-ray dye (contrast fluid). * If you may be pregnant.  What should I do after the exam: You may have slight cramping during this exam. The cramps should go away afterward. You may have some spotting after the exam.  What is this test: A spinal shot is done in or near the spine. You may receive steroid medicine (to reduce swelling) or contrast fluid (dye that makes the area show up more clearly on X-rays). A nerve root shot is a shot into the nerve near the spine. It may reduce inflammation near the nerve root or spinal cord and reduce pain in the arm or leg.  Who should I call with questions: If you have any questions, please call the Imaging Department where you will have your exam. Directions, parking instructions, and other information is available on our website, [email or web address].            Dec 03, 2018 10:00 AM CST   Return Walk In Ortho with Manav Lopez MD   [facility] and Orthopaedic Walk In Clinic ([facility])    [address] [place] [address] [phone]              Who to contact     Please call your clinic at [phone] to:    Ask questions about your health    Make or cancel appointments    Discuss your medicines    Learn about your test results    Speak to your doctor            Additional Information About Your Visit        Care EveryWhere ID     This is your Care EveryWhere ID. This could be used by other organizations to access your [place] medical records  [id number]         Blood Pressure from Last 3 Encounters:   10/06/18 (!) 172/96   10/04/18 156/83   07/31/18 127/62    Weight from Last 3 Encounters:   10/06/18 69.9 kg (154 lb)   10/04/18 69.9 kg (154 lb)   07/31/18 73.5 kg (162 lb)              Today, you had the following     No orders found for display         Today's Medication Changes          These changes are accurate as of 11/14/18 12:04 PM.  If you have any questions, ask your nurse or doctor.                Start taking these medicines.        Dose/Directions    naproxen 500 MG tablet   Commonly known as:  NAPROSYN   Used for:  Lumbar back pain with radiculopathy affecting left lower extremity        Dose:  500 mg   Take 1 tablet (500 mg) by mouth 2 times daily (with meals)   Quantity:  20 tablet   Refills:  0       traMADol 50 MG tablet   Commonly known as:  ULTRAM   Used for:  Lumbar back pain with radiculopathy affecting left lower extremity        Dose:  50 mg   Take 1 tablet (50 mg) by mouth every 6 hours as needed for severe pain   Quantity:  10 tablet   Refills:  0            Where to get your medicines      These medications were sent to [place], [place] - [address]  [address], [facility] [address]    Hours:  TRANSPLANT PHONE NUMBER [phone] Phone:  [phone]     naproxen 500 MG tablet         Some of these will need a paper prescription and others can be bought over the counter.  Ask your nurse if you have questions.     Bring a paper prescription for each of these medications     traMADol 50 MG tablet               Information about OPIOIDS     PRESCRIPTION OPIOIDS: WHAT YOU NEED TO KNOW   We gave you an opioid (narcotic) pain medicine. It is important to manage your pain, but opioids are not always the best choice. You should first try all the other options your care team gave you. Take this medicine for as short a time (and as few doses) as possible.    Some activities can increase your pain, such as bandage changes or therapy sessions. It may help to take your pain medicine 30 to 60 minutes before these activities. Reduce your stress by getting enough sleep, working on hobbies you enjoy and practicing relaxation or meditation. Talk to your care team about ways to manage your pain beyond prescription opioids.    These medicines have risks:    DO NOT drive when on new or higher doses of pain medicine. These medicines can affect  your alertness and reaction times, and you could be arrested for driving under the influence (DUI). If you need to use opioids long-term, talk to your care team about driving.    DO NOT operate heavy machinery    DO NOT do any other dangerous activities while taking these medicines.    DO NOT drink any alcohol while taking these medicines.     If the opioid prescribed includes acetaminophen, DO NOT take with any other medicines that contain acetaminophen. Read all labels carefully. Look for the word  acetaminophen  or  Tylenol.  Ask your pharmacist if you have questions or are unsure.    You can get addicted to pain medicines, especially if you have a history of addiction (chemical, alcohol or substance dependence). Talk to your care team about ways to reduce this risk.    All opioids tend to cause constipation. Drink plenty of water and eat foods that have a lot of fiber, such as fruits, vegetables, prune juice, apple juice and high-fiber cereal. Take a laxative (Miralax, milk of magnesia, Colace, Senna) if you don t move your bowels at least every other day. Other side effects include upset stomach, sleepiness, dizziness, throwing up, tolerance (needing more of the medicine to have the same effect), physical dependence and slowed breathing.    Store your pills in a secure place, locked if possible. We will not replace any lost or stolen medicine. If you don t finish your medicine, please throw away (dispose) as directed by your pharmacist. The [place] Pollution Control Agency has more information about safe disposal: [email or web address]-[email or web address]         Primary Care Provider Fax #    Provider Not In System [phone]                Equal Access to Services     [facility]: Nina Webber, waghislaine taylor, qaybta kaalmada hanane, dany reddy. So [facility] [phone].    ATENCIÓN: Si reggiela español, tiene a rene disposición  servicios gratuitos de asistencia lingüística. Walter hutton [phone].    We comply with applicable federal civil rights laws and [place] laws. We do not discriminate on the basis of race, color, national origin, age, disability, sex, sexual orientation, or gender identity.            Thank you!     Thank you for choosing [facility] SPORTS AND ORTHOPAEDIC WALK IN CLINIC  for your care. Our goal is always to provide you with excellent care. Hearing back from our patients is one way we can continue to improve our services. Please take a few minutes to complete the written survey that you may receive in the mail after your visit with us. Thank you!             Your Updated Medication List - Protect others around you: Learn how to safely use, store and throw away your medicines at www.disposemymeds.org.          This list is accurate as of 11/14/18 12:04 PM.  Always use your most recent med list.                   Brand Name Dispense Instructions for use Diagnosis    albuterol 108 (90 Base) MCG/ACT inhaler    PROAIR HFA/PROVENTIL HFA/VENTOLIN HFA    1 Inhaler    Inhale 2 puffs into the lungs every 6 hours as needed for shortness of breath / dyspnea or wheezing    Allergic rhinitis due to pollen       ARTHRITIS PAIN RELIEF PO      Take 650 mg by mouth daily Takes 2 tablets daily in am.Christine Escalante LPN 3:00 PM on 6/8/2018        atorvastatin 40 MG tablet    LIPITOR    30 tablet    Take 1 tablet (40 mg) by mouth daily    Hyperlipidemia LDL goal <100       blood glucose monitoring meter device kit    no brand specified    1 kit    Use to test blood sugar 3 times daily or as directed.    Type 2 diabetes mellitus without complication, with long-term current use of insulin (H)       clopidogrel 75 MG tablet    PLAVIX     Take 75 mg by mouth        COMFORT LANCETS Misc     100 each    1 each 3 times daily    Type 2 diabetes mellitus without complication, with long-term current use of insulin (H)       glipiZIDE 5 MG 24 hr  tablet    GLUCOTROL XL    30 tablet    Take 1 tablet (5 mg) by mouth daily    Type 2 diabetes mellitus with microalbuminuria, unspecified long term insulin use status       lisinopril 2.5 MG tablet    PRINIVIL/Zestril          loratadine 10 MG tablet    CLARITIN     Take 10 mg by mouth as needed for allergies        metFORMIN 500 MG 24 hr tablet    GLUCOPHAGE-XR          metoprolol succinate 25 MG 24 hr tablet    TOPROL-XL     Take 25 mg by mouth        naproxen 500 MG tablet    NAPROSYN    20 tablet    Take 1 tablet (500 mg) by mouth 2 times daily (with meals)    Lumbar back pain with radiculopathy affecting left lower extremity       polyethylene glycol powder    MIRALAX    119 g    Take 17 g (1 capful) by mouth daily    Slow transit constipation       traMADol 50 MG tablet    ULTRAM    10 tablet    Take 1 tablet (50 mg) by mouth every 6 hours as needed for severe pain    Lumbar back pain with radiculopathy affecting left lower extremity

## 2018-11-14 NOTE — PROGRESS NOTES
[facility]  Orthopedics  Jae Hurtado,   2018     Name: Bettina Guerrero  MRN: [id number]  Age: 68 year old  : 1950  Referring provider: No ref. provider found     Chief Complaint: left hip pain and left lower extremity pain and weakness    History of Present Illness:   Bettina Guerrero is a 68 year old  female with diabetes mellitus who presents today for follow-up regarding left hip pain and left lower extremity pain and weakness. She was last seen by Dr. Lopez on 10/6/2018, at which point she was experiencing this same issue. Dr. Lopez recommended a epidural steroid injection at L3/L4.    She was also seen in the emergency room on 10/11/2018 for this issue. She was given aspirin and hydrocodone-acetaminophen at this visit. She reports this provided no continuing relief of her pain.    She had an EMG done here on . She was supposed to have her epidural steroid injection performed today, but she did not stop taking her Plavix so this procedure was rescheduled. She reports this occurred because she didn't receive a letter telling her to stop taking this medication. This injection has been rescheduled for the .      Today, she continues to have constant hip pain around the full left side of her hip. This pain also radiates throughout her whole left leg. Her left knee hurts as well. The patient also has a burning pain in her left anterior thigh. She also has weakness in her left leg. She has to pull on her sock to lift her leg onto her bed due to this leg weakness.    She reports that at a dose of 375 mg of Naproxen she has pain and difficulty sleeping, but at 500 mg Naproxen she is able to sleep. She has no stomach issues when taking Naproxen. She uses a heating pad to help get to sleep.     Of note, the patient reports that she reacts to Percocet with itching but no systemic allergy.     Review of Systems:   A 10-point review of systems was obtained and is  negative except for as noted in the HPI.   (11/14/2018)  CONSTITUTIONAL: Denies fever and weight loss  EYES: Denies acute vision changes  ENT: Denies hearing changes or difficulty swallowing  CARDIAC: Denies edema  RESPIRATORY: Denies dyspnea, cough or wheeze  GASTROINTESTINAL: Denies abdominal pain, nausea, vomiting  MUSCULOSKELETAL: See HPI  SKIN: Denies any recent rash or lesion  NEUROLOGICAL: Denies numbness  PSYCHIATRIC: No history of psychiatric symptoms or problems  ENDOCRINE: Diagnosis of diabetes:  Lab Results   Component Value Date    A1C 12.4 06/08/2018    A1C 12.8 08/10/2017    A1C 9.4 06/14/2016    A1C 10.2 04/05/2016    A1C 9.3 09/29/2015     HEMATOLOGY: Denies episodes of easy bleeding     Physical Examination:  not currently breastfeeding.  General  - normal appearance, in no obvious distress  CV  - normal femoral pulse  Pulm  - normal respiratory pattern, non-labored  Musculoskeletal - Left hip  - stance: Antalgic gait, ambulating with cane  - inspection: no swelling or effusion,  Increased thoracic kyphosis- palpation: no posterior, lateral or anterior hip tenderness  - ROM: normal flexion, extension, IR, abduction, adduction, no crepitus. Right rotation causes discomfort in the left low back with radiation  - strength:   Hip flexion is 4/5  Knee extension is 4/5  Knee flexion 4+/5  Bilateral ankle internal rotation 5/5    Ankle plantar flexion and dorsiflexion 5/5   Neuro  - no motor deficit, grossly normal coordination, normal muscle tone. Sensation intact in both legs.  Skin  - no ecchymosis, erythema, warmth, or induration, no obvious rash  Psych  - interactive, appropriate, normal mood and affect    Assessment:   68 year old female with continuing left hip pain and left lower extremity pain and weakness. She was holding on for VALERIO today, but because she continued to take plavix, scheduled one week out. Will provide short supply of medications to last until next week.      Plan:   -  Prescription for Naproxen 500 mg BID.  - Tramadol for breakthrough pain, small rx  - Continue using heating pad  - Plan for epidural steroid injection on 11/21/2018. Hold Plavix for this injection 5 days prior.  - Follow up with Dr. Lopez 2 weeks after VALERIO.    It was a pleasure seeing Bettina today.    Jae Hurtado DO, [facility]  Primary Care Sports Medicine    I, Jae Hurtado DO, have reviewed the above note and agree with the scribe's notation as written.    Scribe Disclosure:   I, Jose F Waller, am serving as a scribe to document services personally performed by Jae Hurtado DO at this visit, based upon the provider's statements to me. All documentation has been reviewed by the aforementioned provider prior to being entered into the official medical record.

## 2018-11-16 ENCOUNTER — TELEPHONE (OUTPATIENT)
Dept: ORTHOPEDICS | Facility: CLINIC | Age: 68
End: 2018-11-16

## 2018-11-16 NOTE — TELEPHONE ENCOUNTER
[facility] Call Center    Phone Message    May a detailed message be left on voicemail: yes    Reason for Call: Other: Pt can't get inj bc she cannot stop taking clopidogrel (PLAVIX) 75 MG tablet - Wondering if pt could be seen sooner in clinic     Action Taken: Message routed to:  [facility] Sports [facility] In

## 2018-11-21 ENCOUNTER — RADIANT APPOINTMENT (OUTPATIENT)
Dept: GENERAL RADIOLOGY | Facility: CLINIC | Age: 68
End: 2018-11-21
Attending: FAMILY MEDICINE
Payer: COMMERCIAL

## 2018-11-21 VITALS
HEART RATE: 96 BPM | RESPIRATION RATE: 16 BRPM | DIASTOLIC BLOOD PRESSURE: 90 MMHG | TEMPERATURE: 97.9 F | OXYGEN SATURATION: 96 % | SYSTOLIC BLOOD PRESSURE: 176 MMHG

## 2018-11-21 RX ORDER — IOPAMIDOL 408 MG/ML
20 INJECTION, SOLUTION INTRATHECAL ONCE
Status: COMPLETED | OUTPATIENT
Start: 2018-11-21 | End: 2018-11-21

## 2018-11-21 RX ORDER — METHYLPREDNISOLONE ACETATE 80 MG/ML
80 INJECTION, SUSPENSION INTRA-ARTICULAR; INTRALESIONAL; INTRAMUSCULAR; SOFT TISSUE ONCE
Status: COMPLETED | OUTPATIENT
Start: 2018-11-21 | End: 2018-11-21

## 2018-11-21 RX ORDER — LIDOCAINE HYDROCHLORIDE 10 MG/ML
30 INJECTION, SOLUTION EPIDURAL; INFILTRATION; INTRACAUDAL; PERINEURAL ONCE
Status: COMPLETED | OUTPATIENT
Start: 2018-11-21 | End: 2018-11-21

## 2018-11-21 RX ORDER — BUPIVACAINE HYDROCHLORIDE 5 MG/ML
10 INJECTION, SOLUTION EPIDURAL; INTRACAUDAL ONCE
Status: COMPLETED | OUTPATIENT
Start: 2018-11-21 | End: 2018-11-21

## 2018-11-21 RX ADMIN — LIDOCAINE HYDROCHLORIDE 5 ML: 10 INJECTION, SOLUTION EPIDURAL; INFILTRATION; INTRACAUDAL; PERINEURAL at 10:56

## 2018-11-21 RX ADMIN — BUPIVACAINE HYDROCHLORIDE 15 MG: 5 INJECTION, SOLUTION EPIDURAL; INTRACAUDAL at 10:56

## 2018-11-21 RX ADMIN — IOPAMIDOL 10 ML: 408 INJECTION, SOLUTION INTRATHECAL at 10:56

## 2018-11-21 RX ADMIN — METHYLPREDNISOLONE ACETATE 80 MG: 80 INJECTION, SUSPENSION INTRA-ARTICULAR; INTRALESIONAL; INTRAMUSCULAR; SOFT TISSUE at 10:56

## 2018-11-21 NOTE — MR AVS SNAPSHOT
MRN:[id number]                      After Visit Summary   11/21/2018    Bettina Guerrero    MRN: [id number]           Visit Information        Provider Department      11/21/2018 11:30 AM  NEURO RAD;  IMAGING NURSE; XR3 [facility] Xray           Review of your medicines          These changes are accurate as of 11/21/18 10:45 AM.  If you have any questions, ask your nurse or doctor.               CONTINUE these medicines which have NOT CHANGED        Dose / Directions    albuterol 108 (90 Base) MCG/ACT inhaler   Commonly known as:  PROAIR HFA/PROVENTIL HFA/VENTOLIN HFA   Used for:  Allergic rhinitis due to pollen        Dose:  2 puff   Inhale 2 puffs into the lungs every 6 hours as needed for shortness of breath / dyspnea or wheezing   Quantity:  1 Inhaler   Refills:  1       ARTHRITIS PAIN RELIEF PO        Dose:  650 mg   Take 650 mg by mouth daily Takes 2 tablets daily in am.Christine Escalante LPN 3:00 PM on 6/8/2018   Refills:  0       atorvastatin 40 MG tablet   Commonly known as:  LIPITOR   Used for:  Hyperlipidemia LDL goal <100        Dose:  40 mg   Take 1 tablet (40 mg) by mouth daily   Quantity:  30 tablet   Refills:  2       blood glucose monitoring meter device kit   Commonly known as:  no brand specified   Used for:  Type 2 diabetes mellitus without complication, with long-term current use of insulin (H)        Use to test blood sugar 3 times daily or as directed.   Quantity:  1 kit   Refills:  0       clopidogrel 75 MG tablet   Commonly known as:  PLAVIX        Dose:  75 mg   Take 75 mg by mouth   Refills:  0       COMFORT LANCETS Misc   Used for:  Type 2 diabetes mellitus without complication, with long-term current use of insulin (H)        Dose:  1 each   1 each 3 times daily   Quantity:  100 each   Refills:  3       glipiZIDE 5 MG 24 hr tablet   Commonly known as:  GLUCOTROL XL   Used for:  Type 2 diabetes mellitus with microalbuminuria, unspecified long term  insulin use status        Dose:  5 mg   Take 1 tablet (5 mg) by mouth daily   Quantity:  30 tablet   Refills:  1       lisinopril 2.5 MG tablet   Commonly known as:  PRINIVIL/Zestril        Refills:  0       loratadine 10 MG tablet   Commonly known as:  CLARITIN        Dose:  10 mg   Take 10 mg by mouth as needed for allergies   Refills:  0       metFORMIN 500 MG 24 hr tablet   Commonly known as:  GLUCOPHAGE-XR        Refills:  0       metoprolol succinate 25 MG 24 hr tablet   Commonly known as:  TOPROL-XL        Dose:  25 mg   Take 25 mg by mouth   Refills:  0       naproxen 500 MG tablet   Commonly known as:  NAPROSYN   Used for:  Lumbar back pain with radiculopathy affecting left lower extremity        Dose:  500 mg   Take 1 tablet (500 mg) by mouth 2 times daily (with meals)   Quantity:  20 tablet   Refills:  0       polyethylene glycol powder   Commonly known as:  MIRALAX   Used for:  Slow transit constipation        Dose:  1 capful   Take 17 g (1 capful) by mouth daily   Quantity:  119 g   Refills:  3       traMADol 50 MG tablet   Commonly known as:  ULTRAM   Used for:  Lumbar back pain with radiculopathy affecting left lower extremity        Dose:  50 mg   Take 1 tablet (50 mg) by mouth every 6 hours as needed for severe pain   Quantity:  10 tablet   Refills:  0                Protect others around you: Learn how to safely use, store and throw away your medicines at www.disposemymeds.org.         Follow-ups after your visit        Your next 10 appointments already scheduled     Nov 21, 2018 11:30 AM CST   XR LUMBAR TRANSLAMINAR INJECTION with JACKIEXR3, JACKIE IMAGING NURSE, JACKIE NEURO RAD   [facility] ([facility])    [address] [address] [phone]           How do I prepare for my exam? (Food and drink instructions) No Food and Drink Restrictions.  How do I prepare for my exam? (Other instructions) * If you take Coumadin (or any  other blood thinners) you may need to stop taking them up to 5 days prior to the exam. Talk to your doctor before stopping. * If you take Plavix, Ticlid, Pletal or Persantine, please ask your doctor if you should stop these medicines. You may need extra tests on the morning of your scan. * If you take Xarelto (Rivaroxaban), you may need to stop taking it 24 hours before treatment. Talk to your doctor before stopping this medicine.  What should I wear: Wear comfortable clothes.  How long does the exam take: Injections take about 30 to 45 minutes. Most people spend up to 2 hours in the clinic or hospital.  What should I bring: Please bring any scans or X-rays taken at other hospitals, if similar tests were done. Also bring a list of your medicines, including vitamins, minerals and over-the-counter drugs. It is safest to leave personal items at home. Do I need a :  Plan to have someone drive you home afterward.  What do I need to tell my doctor: Tell your doctor in advance: * If you are allergic to X-ray dye (contrast fluid). * If you may be pregnant.  What should I do after the exam: You may have slight cramping during this exam. The cramps should go away afterward. You may have some spotting after the exam.  What is this test: A spinal shot is done in or near the spine. You may receive steroid medicine (to reduce swelling) or contrast fluid (dye that makes the area show up more clearly on X-rays). A nerve root shot is a shot into the nerve near the spine. It may reduce inflammation near the nerve root or spinal cord and reduce pain in the arm or leg.  Who should I call with questions: If you have any questions, please call the Imaging Department where you will have your exam. Directions, parking instructions, and other information is available on our website, "[email or web address].            Dec 03, 2018 10:00 AM CST   Return Walk In Ortho with Manav Lopez MD   [facility] Orthopaedic Walk  In Clinic ([facility])    [address]  4th Floor  [facility] [address] [phone]               Care Instructions        Further instructions from your care team       Discharge Instructions for Epidural Steroid Injection/Nerve Block    Care of injection site:    If you have new numbness down your leg after the injection, this may last up to 4-6 hours, but should go away. You may need help with walking until your leg feels normal.    Over the next 24 to 48 hours, pain at the injection site may increase before it gets better.    For the next 48 hours, use ice packs for 15 minutes,3-4 times a day for pain relief.    If you have a bandage, you may remove it the next morning     Do not submerge injection site in water for 24 hours, (no baths. pools, hot tubs). Showers are OK.            Activity:    You should relax today and then you may return to your regular activity tomorrow.    You may eat a normal diet.    Medicines:    Restart all your medicines tomorrow at your regular dose, including Coumadin (Warfarin).    If you are restarting Coumadin, talk to your doctor about having your INR checked.    If you received steroids: The steroid should help reduce swelling and pain. This may take from 3-14 days. Pain from the shot will be mild and go away in 2-3 days.     Common side effects may include:    Insomnia    Heartburn    Flushed face    Water retention    Increased appetite    Increased blood sugar      If you have diabetes, watch your blood sugar closely, If needed, call your doctor to help control your blood sugar.    Some patients will get lasting relief from a single injection. Others may require additional injections to get results.     Call your doctor or go to the Emergency Room if you have new severe pain or fever.     Additional Information About Your Visit        Care EveryWhere ID     This is your Care EveryWhere ID. This could be used by other organizations to  access your [facility] medical records  [id number]        Your Vitals Were     Blood Pressure Pulse Temperature Respirations Pulse Oximetry       178/95 96 97.9  F (36.6  C) (Oral) 16 96%        Primary Care Provider Fax #    Provider Not In System [phone]      Equal Access to Services     PHYLICIAJAHAIRA VIDALLAZARO : Hadii reginaldo ku hadnoemyo Soomaali, waaxda luqadaha, qaybta kaalmada adeegyada, waxaung tylerin hayaan christy gallegos laroblesmemo . So [facility] [phone].    ATENCIÓN: Si habla español, tiene a rene disposición servicios gratuitos de asistencia lingüística. Llame al [phone].    We comply with applicable federal civil rights laws and [place] laws. We do not discriminate on the basis of race, color, national origin, age, disability, sex, sexual orientation, or gender identity.            Thank you!     Thank you for choosing [facility] for your care. Our goal is always to provide you with excellent care. Hearing back from our patients is one way we can continue to improve our services. Please take a few minutes to complete the written survey that you may receive in the mail after you visit with us. Thank you!             Medication List: This is a list of all your medications and when to take them. Check marks below indicate your daily home schedule. Keep this list as a reference.          This list is accurate as of 11/21/18 10:45 AM.  Always use your most recent med list.               Medications           Morning Afternoon Evening Bedtime As Needed    albuterol 108 (90 Base) MCG/ACT inhaler   Commonly known as:  PROAIR HFA/PROVENTIL HFA/VENTOLIN HFA   Inhale 2 puffs into the lungs every 6 hours as needed for shortness of breath / dyspnea or wheezing                                ARTHRITIS PAIN RELIEF PO   Take 650 mg by mouth daily Takes 2 tablets daily in am.Christine Escalante LPN 3:00 PM on 6/8/2018                                atorvastatin 40 MG tablet   Commonly known as:  LIPITOR   Take 1 tablet (40 mg) by mouth  daily                                blood glucose monitoring meter device kit   Commonly known as:  no brand specified   Use to test blood sugar 3 times daily or as directed.                                clopidogrel 75 MG tablet   Commonly known as:  PLAVIX   Take 75 mg by mouth                                COMFORT LANCETS Misc   1 each 3 times daily                                glipiZIDE 5 MG 24 hr tablet   Commonly known as:  GLUCOTROL XL   Take 1 tablet (5 mg) by mouth daily                                lisinopril 2.5 MG tablet   Commonly known as:  PRINIVIL/Zestril                                loratadine 10 MG tablet   Commonly known as:  CLARITIN   Take 10 mg by mouth as needed for allergies                                metFORMIN 500 MG 24 hr tablet   Commonly known as:  GLUCOPHAGE-XR                                metoprolol succinate 25 MG 24 hr tablet   Commonly known as:  TOPROL-XL   Take 25 mg by mouth                                naproxen 500 MG tablet   Commonly known as:  NAPROSYN   Take 1 tablet (500 mg) by mouth 2 times daily (with meals)                                polyethylene glycol powder   Commonly known as:  MIRALAX   Take 17 g (1 capful) by mouth daily                                traMADol 50 MG tablet   Commonly known as:  ULTRAM   Take 1 tablet (50 mg) by mouth every 6 hours as needed for severe pain

## 2018-11-29 ENCOUNTER — PATIENT OUTREACH (OUTPATIENT)
Dept: CARE COORDINATION | Facility: CLINIC | Age: 68
End: 2018-11-29

## 2018-12-03 ENCOUNTER — RECORDS - HEALTHEAST (OUTPATIENT)
Dept: LAB | Facility: CLINIC | Age: 68
End: 2018-12-03

## 2018-12-03 ENCOUNTER — OFFICE VISIT (OUTPATIENT)
Dept: ORTHOPEDICS | Facility: CLINIC | Age: 68
End: 2018-12-03
Payer: COMMERCIAL

## 2018-12-03 VITALS — WEIGHT: 154 LBS | HEIGHT: 63 IN | RESPIRATION RATE: 16 BRPM | BODY MASS INDEX: 27.29 KG/M2

## 2018-12-03 DIAGNOSIS — G57.92 NEUROPATHY OF LEFT LOWER EXTREMITY: Primary | ICD-10-CM

## 2018-12-03 LAB
ANION GAP SERPL CALCULATED.3IONS-SCNC: 13 MMOL/L (ref 5–18)
BUN SERPL-MCNC: 33 MG/DL (ref 8–22)
CALCIUM SERPL-MCNC: 9.3 MG/DL (ref 8.5–10.5)
CHLORIDE BLD-SCNC: 98 MMOL/L (ref 98–107)
CO2 SERPL-SCNC: 28 MMOL/L (ref 22–31)
CREAT SERPL-MCNC: 1.2 MG/DL (ref 0.6–1.1)
GFR SERPL CREATININE-BSD FRML MDRD: 45 ML/MIN/1.73M2
GLUCOSE BLD-MCNC: 250 MG/DL (ref 70–125)
POTASSIUM BLD-SCNC: 4.2 MMOL/L (ref 3.5–5)
SODIUM SERPL-SCNC: 139 MMOL/L (ref 136–145)

## 2018-12-03 RX ORDER — POTASSIUM CHLORIDE 1500 MG/1
30 TABLET, EXTENDED RELEASE ORAL
COMMUNITY
Start: 2018-11-30 | End: 2018-12-14

## 2018-12-03 RX ORDER — FUROSEMIDE 20 MG
TABLET ORAL
COMMUNITY
Start: 2018-11-30 | End: 2022-09-08

## 2018-12-03 NOTE — MR AVS SNAPSHOT
After Visit Summary   12/3/2018    Bettina Guerrero    MRN: [id number]           Patient Information     Date Of Birth          1950        Visit Information        Provider Department      12/3/2018 10:00 AM Manav Lopez MD [facility] Sports and Orthopaedic Walk In Clinic        Today's Diagnoses     Neuropathy of left lower extremity    -  1       Follow-ups after your visit        Additional Services     NEUROLOGY ADULT REFERRAL       Your provider has referred you for the following:   Consult at [facility] [place]: Neurology Clinic - [place] [phone]   [email or web address]  General Neurology    Please be aware that coverage of these services is subject to the terms and limitations of your health insurance plan.  Call member services at your health plan with any benefit or coverage questions.      Please bring the following with you to your appointment:    (1) Any X-Rays, CTs or MRIs which have been performed.  Contact the facility where they were done to arrange for  prior to your scheduled appointment.    (2) List of current medications  (3) This referral request   (4) Any documents/labs given to you for this referral                  Your next 10 appointments already scheduled     Mar 06, 2019 10:20 AM CST   (Arrive by 10:05 AM)   New Neuropathy with Cristian Amato MD   [facility] Neurology ([facility])    [address] [place] [address] [phone]              Who to contact     Please call your clinic at [phone] to:    Ask questions about your health    Make or cancel appointments    Discuss your medicines    Learn about your test results    Speak to your doctor            Additional Information About Your Visit        Care EveryWhere ID     This is your Care EveryWhere ID. This could be used by other organizations to access your [facility]  "records  [id number]        Your Vitals Were     Respirations Height BMI (Body Mass Index)             16 1.6 m (5' 3\") 27.28 kg/m2          Blood Pressure from Last 3 Encounters:   11/21/18 176/90   10/06/18 (!) 172/96   10/04/18 156/83    Weight from Last 3 Encounters:   12/03/18 69.9 kg (154 lb)   10/06/18 69.9 kg (154 lb)   10/04/18 69.9 kg (154 lb)              We Performed the Following     NEUROLOGY ADULT REFERRAL        Primary Care Provider Fax #    Provider Not In System [phone]                Equal Access to Services     [facility]: Hadii reginaldo Webber, jocelin taylor, dajuan koo, dany bowers . So [facility] [phone].    ATENCIÓN: Si habla español, tiene a rene disposición servicios gratuitos de asistencia lingüística. Llame al [phone].    We comply with applicable federal civil rights laws and [place] laws. We do not discriminate on the basis of race, color, national origin, age, disability, sex, sexual orientation, or gender identity.            Thank you!     Thank you for choosing [facility] SPORTS AND ORTHOPAEDIC WALK IN CLINIC  for your care. Our goal is always to provide you with excellent care. Hearing back from our patients is one way we can continue to improve our services. Please take a few minutes to complete the written survey that you may receive in the mail after your visit with us. Thank you!             Your Updated Medication List - Protect others around you: Learn how to safely use, store and throw away your medicines at www.disposemymeds.org.          This list is accurate as of 12/3/18 11:13 AM.  Always use your most recent med list.                   Brand Name Dispense Instructions for use Diagnosis    albuterol 108 (90 Base) MCG/ACT inhaler    PROAIR HFA/PROVENTIL HFA/VENTOLIN HFA    1 Inhaler    Inhale 2 puffs into the lungs every 6 hours as needed for shortness of breath / dyspnea or wheezing    Allergic rhinitis " due to pollen       ARTHRITIS PAIN RELIEF PO      Take 650 mg by mouth daily Takes 2 tablets daily in am.Christine Boris BAUMAN 3:00 PM on 6/8/2018        atorvastatin 40 MG tablet    LIPITOR    30 tablet    Take 1 tablet (40 mg) by mouth daily    Hyperlipidemia LDL goal <100       blood glucose monitoring meter device kit    NO BRAND SPECIFIED    1 kit    Use to test blood sugar 3 times daily or as directed.    Type 2 diabetes mellitus without complication, with long-term current use of insulin (H)       clopidogrel 75 MG tablet    PLAVIX     Take 75 mg by mouth        COMFORT LANCETS Misc     100 each    1 each 3 times daily    Type 2 diabetes mellitus without complication, with long-term current use of insulin (H)       furosemide 20 MG tablet    LASIX          glipiZIDE 5 MG 24 hr tablet    GLUCOTROL XL    30 tablet    Take 1 tablet (5 mg) by mouth daily    Type 2 diabetes mellitus with microalbuminuria, unspecified long term insulin use status       lisinopril 2.5 MG tablet    PRINIVIL/Zestril     20 mg        loratadine 10 MG tablet    CLARITIN     Take 10 mg by mouth as needed for allergies        metFORMIN 500 MG 24 hr tablet    GLUCOPHAGE-XR          metoprolol succinate ER 25 MG 24 hr tablet    TOPROL-XL     Take 100 mg by mouth        naproxen 500 MG tablet    NAPROSYN    20 tablet    Take 1 tablet (500 mg) by mouth 2 times daily (with meals)    Lumbar back pain with radiculopathy affecting left lower extremity       polyethylene glycol powder    MIRALAX    119 g    Take 17 g (1 capful) by mouth daily    Slow transit constipation       potassium chloride ER 20 MEQ CR tablet    K-DUR/KLOR-CON M     Take 30 mEq by mouth        traMADol 50 MG tablet    ULTRAM    10 tablet    Take 1 tablet (50 mg) by mouth every 6 hours as needed for severe pain    Lumbar back pain with radiculopathy affecting left lower extremity

## 2018-12-03 NOTE — PROGRESS NOTES
"      SPORTS & ORTHOPEDIC WALK-IN FOLLOW-UP VISIT 12/3/2018    Interval History:     Follow up reason: F/U after VALERIO    Date of injury:     Date last seen: 11/14/18    Following Therapeutic Plan: Yes     Pain: Improving    Function: Improving  Interval History: underwent VALERIO on 11/21/18. Then was subsequently hospitalized for heart failure last week. Overall, feels that left leg symptoms have improved since injection. About 50% better. Pain has improved but still having burning, scratching sensation in anterior thigh and knee. Worse when lying down, better when sitting. Strength is slightly better. Has not done PT for leg, but does have to do cardiac rehab after hospital discharge.     Medical History:    Any recent changes to your medical history? No    Any new medication prescribed since last visit? No    Review of Systems:    Do you have fever, chills, weight loss? No    Do you have any vision problems? No    Do you have any chest pain or edema? No    Do you have any shortness of breath or wheezing?  No    Do you have stomach problems? No    Do you have any numbness or focal weakness? No    Do you have diabetes? Yes, type 2    Do you have problems with bleeding or clotting? On blood thinners    Do you have an rashes or other skin lesions? No         EXAM:Resp 16  Ht 1.6 m (5' 3\")  Wt 69.9 kg (154 lb)  BMI 27.28 kg/m2    General: Normal appearance, in no obvious distress.  Musculoskeletal: weakness in hip flexion and knee extension on the left compared to right but improved from previous.  Knee flexion, ankle dorsiflexion, extension symmetric. Sensation to light touch symmetric in LE, but reports pins and needles sensation in anterior left thigh and over knee. Slump test negative.     Imaging:   EMG Left lower extremity dated 11/1/18:   Interpretation: The EMG is abnormal.  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  There is also evidence for a generalized sensorimotor peripheral neuropathy    MRI lumbar spine dated 10/5/18:   T12-L1: No spinal canal or neuroforaminal stenosis.     L1-2: Disc bulge. Mild bilateral facet arthropathy. Mild neural  foraminal narrowing left greater than right.     L2-3: Disc bulge. No significant spinal canal or neural foraminal  narrowing. Mild bilateral facet arthropathy.     L3-4: Disc bulge with protrusion into the lateral recesses  bilaterally. Mild spinal canal narrowing. Moderate bilateral facet  arthropathy and extension of the disc bulge resulting in mild left and  mild-to-moderate right     L4-5: Disc bulge most prominent lesion in both lateral recess. Mild  bilateral facet arthropathy and extension of the disc bulge causing  mild to moderate right and mild left foraminal stenosis.      L5-S1: Mild bilateral neural foraminal narrowing. No significant  spinal canal narrowing.      Assessment: Patient is a 68 year old female with left lower extremity pain, weakness, and paresthesia that has improved somewhat after VALERIO, though further improvement may be limited secondary to EMG findings suggestive of diabetic plexopathy.     Recommendations:   Reviewed EMG, MRI with patient as well as current assessment  Plan to refer to neurology for further assessment and/or treatment if plexopathy felt to be ultimate underlying cause  In the meantime, recommended scheduled tylenol for pain, if present  Continue with cardiac rehab and leg strengthening exercises.     Manav Lopez MD                "

## 2018-12-13 ENCOUNTER — RECORDS - HEALTHEAST (OUTPATIENT)
Dept: LAB | Facility: CLINIC | Age: 68
End: 2018-12-13

## 2018-12-13 LAB
ANION GAP SERPL CALCULATED.3IONS-SCNC: 9 MMOL/L (ref 5–18)
BUN SERPL-MCNC: 36 MG/DL (ref 8–22)
CALCIUM SERPL-MCNC: 9.4 MG/DL (ref 8.5–10.5)
CHLORIDE BLD-SCNC: 102 MMOL/L (ref 98–107)
CO2 SERPL-SCNC: 28 MMOL/L (ref 22–31)
CREAT SERPL-MCNC: 1.03 MG/DL (ref 0.6–1.1)
GFR SERPL CREATININE-BSD FRML MDRD: 53 ML/MIN/1.73M2
GLUCOSE BLD-MCNC: 194 MG/DL (ref 70–125)
POTASSIUM BLD-SCNC: 4.3 MMOL/L (ref 3.5–5)
SODIUM SERPL-SCNC: 139 MMOL/L (ref 136–145)

## 2019-02-18 ENCOUNTER — RECORDS - HEALTHEAST (OUTPATIENT)
Dept: LAB | Facility: CLINIC | Age: 69
End: 2019-02-18

## 2019-02-18 LAB
ANION GAP SERPL CALCULATED.3IONS-SCNC: 11 MMOL/L (ref 5–18)
BUN SERPL-MCNC: 30 MG/DL (ref 8–22)
CALCIUM SERPL-MCNC: 9.6 MG/DL (ref 8.5–10.5)
CHLORIDE BLD-SCNC: 99 MMOL/L (ref 98–107)
CO2 SERPL-SCNC: 29 MMOL/L (ref 22–31)
CREAT SERPL-MCNC: 1.21 MG/DL (ref 0.6–1.1)
CREAT UR-MCNC: 125.9 MG/DL
GFR SERPL CREATININE-BSD FRML MDRD: 44 ML/MIN/1.73M2
GLUCOSE BLD-MCNC: 221 MG/DL (ref 70–125)
MICROALBUMIN UR-MCNC: 9.22 MG/DL (ref 0–1.99)
MICROALBUMIN/CREAT UR: 73.2 MG/G
POTASSIUM BLD-SCNC: 4.4 MMOL/L (ref 3.5–5)
SODIUM SERPL-SCNC: 139 MMOL/L (ref 136–145)

## 2019-02-20 NOTE — TELEPHONE ENCOUNTER
Imaging Received     Image Type (x): Disc:   PACS: X   Exam Date/Name MRI Brain 10/11/18 Comments: Imaging data services notified to resolve images.

## 2019-02-20 NOTE — TELEPHONE ENCOUNTER
RECORDS RECEIVED FROM: Internal - Dr. Manav Lopez - [facility]   DATE RECEIVED: 3/6/19   NOTES (FOR ALL VISITS) STATUS DETAILS   OFFICE NOTE from referring provider Internal 12/3/18   OFFICE NOTE from other specialist N/A    DISCHARGE SUMMARY from hospital N/A    DISCHARGE REPORT from the ER N/A    OPERATIVE REPORT N/A    MEDICATION LIST Internal    IMAGING  (FOR ALL VISITS)     EMG Internal 11/1/18   EEG N/A    ECT Care Everywhere 11/2/18 11/27/18   MRI (HEAD, NECK, SPINE) Internal  Care Everywhere [facility]:  MRI Lumbar Spine 10/5/18    [facility]:  MRI Brain 10/11/18   CT (HEAD, NECK, SPINE) N/A      Phone Call:     Contact Name [facility]   Outcome Requested MRI Brain images be pushed.

## 2019-03-06 ENCOUNTER — PRE VISIT (OUTPATIENT)
Dept: NEUROLOGY | Facility: CLINIC | Age: 69
End: 2019-03-06

## 2019-06-11 ENCOUNTER — RECORDS - HEALTHEAST (OUTPATIENT)
Dept: LAB | Facility: CLINIC | Age: 69
End: 2019-06-11

## 2019-06-11 LAB
CHOLEST SERPL-MCNC: 134 MG/DL
FASTING STATUS PATIENT QL REPORTED: NO
HDLC SERPL-MCNC: 41 MG/DL
LDLC SERPL CALC-MCNC: 67 MG/DL
TRIGL SERPL-MCNC: 129 MG/DL

## 2019-08-16 ENCOUNTER — RECORDS - HEALTHEAST (OUTPATIENT)
Dept: LAB | Facility: CLINIC | Age: 69
End: 2019-08-16

## 2019-08-17 LAB — BACTERIA SPEC CULT: NO GROWTH

## 2019-08-22 ENCOUNTER — RECORDS - HEALTHEAST (OUTPATIENT)
Dept: LAB | Facility: CLINIC | Age: 69
End: 2019-08-22

## 2019-08-22 LAB
BASOPHILS # BLD AUTO: 0.1 THOU/UL (ref 0–0.2)
BASOPHILS NFR BLD AUTO: 1 % (ref 0–2)
EOSINOPHIL # BLD AUTO: 0.2 THOU/UL (ref 0–0.4)
EOSINOPHIL NFR BLD AUTO: 3 % (ref 0–6)
ERYTHROCYTE [DISTWIDTH] IN BLOOD BY AUTOMATED COUNT: 12.4 % (ref 11–14.5)
FOLATE SERPL-MCNC: 17.1 NG/ML
HCT VFR BLD AUTO: 35.1 % (ref 35–47)
HGB BLD-MCNC: 11.5 G/DL (ref 12–16)
LYMPHOCYTES # BLD AUTO: 1.8 THOU/UL (ref 0.8–4.4)
LYMPHOCYTES NFR BLD AUTO: 28 % (ref 20–40)
MCH RBC QN AUTO: 30.1 PG (ref 27–34)
MCHC RBC AUTO-ENTMCNC: 32.8 G/DL (ref 32–36)
MCV RBC AUTO: 92 FL (ref 80–100)
MONOCYTES # BLD AUTO: 0.4 THOU/UL (ref 0–0.9)
MONOCYTES NFR BLD AUTO: 7 % (ref 2–10)
NEUTROPHILS # BLD AUTO: 3.9 THOU/UL (ref 2–7.7)
NEUTROPHILS NFR BLD AUTO: 61 % (ref 50–70)
PLATELET # BLD AUTO: 214 THOU/UL (ref 140–440)
PMV BLD AUTO: 11.5 FL (ref 8.5–12.5)
RBC # BLD AUTO: 3.82 MILL/UL (ref 3.8–5.4)
VIT B12 SERPL-MCNC: 533 PG/ML (ref 213–816)
WBC: 6.4 THOU/UL (ref 4–11)

## 2019-08-23 LAB
ALBUMIN SERPL-MCNC: 3.8 G/DL (ref 3.5–5)
ALP SERPL-CCNC: 71 U/L (ref 45–120)
ALT SERPL W P-5'-P-CCNC: 14 U/L (ref 0–45)
ANION GAP SERPL CALCULATED.3IONS-SCNC: 12 MMOL/L (ref 5–18)
AST SERPL W P-5'-P-CCNC: 11 U/L (ref 0–40)
BASOPHILS # BLD AUTO: 0.1 THOU/UL (ref 0–0.2)
BASOPHILS NFR BLD AUTO: 1 % (ref 0–2)
BILIRUB SERPL-MCNC: 0.3 MG/DL (ref 0–1)
BUN SERPL-MCNC: 23 MG/DL (ref 8–22)
CALCIUM SERPL-MCNC: 9.3 MG/DL (ref 8.5–10.5)
CHLORIDE BLD-SCNC: 101 MMOL/L (ref 98–107)
CO2 SERPL-SCNC: 26 MMOL/L (ref 22–31)
CREAT SERPL-MCNC: 0.95 MG/DL (ref 0.6–1.1)
EOSINOPHIL # BLD AUTO: 0.2 THOU/UL (ref 0–0.4)
EOSINOPHIL NFR BLD AUTO: 3 % (ref 0–6)
ERYTHROCYTE [DISTWIDTH] IN BLOOD BY AUTOMATED COUNT: 12.4 % (ref 11–14.5)
GFR SERPL CREATININE-BSD FRML MDRD: 58 ML/MIN/1.73M2
GLUCOSE BLD-MCNC: 154 MG/DL (ref 70–125)
HCT VFR BLD AUTO: 35.1 % (ref 35–47)
HGB BLD-MCNC: 11.5 G/DL (ref 12–16)
HPV SOURCE: ABNORMAL
HUMAN PAPILLOMA VIRUS 16 DNA: NEGATIVE
HUMAN PAPILLOMA VIRUS 18 DNA: NEGATIVE
HUMAN PAPILLOMA VIRUS FINAL DIAGNOSIS: ABNORMAL
HUMAN PAPILLOMA VIRUS OTHER HR: POSITIVE
IRON SATN MFR SERPL: 20 % (ref 20–50)
IRON SERPL-MCNC: 65 UG/DL (ref 42–175)
LAB AP CHARGES (HE HISTORICAL CONVERSION): NORMAL
LYMPHOCYTES # BLD AUTO: 1.8 THOU/UL (ref 0.8–4.4)
LYMPHOCYTES NFR BLD AUTO: 28 % (ref 20–40)
MCH RBC QN AUTO: 30.1 PG (ref 27–34)
MCHC RBC AUTO-ENTMCNC: 32.8 G/DL (ref 32–36)
MCV RBC AUTO: 92 FL (ref 80–100)
MONOCYTES # BLD AUTO: 0.4 THOU/UL (ref 0–0.9)
MONOCYTES NFR BLD AUTO: 7 % (ref 2–10)
NEUTROPHILS # BLD AUTO: 3.9 THOU/UL (ref 2–7.7)
NEUTROPHILS NFR BLD AUTO: 61 % (ref 50–70)
PATH REPORT.COMMENTS IMP SPEC: NORMAL
PATH REPORT.COMMENTS IMP SPEC: NORMAL
PATH REPORT.FINAL DX SPEC: NORMAL
PATH REPORT.MICROSCOPIC SPEC OTHER STN: ABNORMAL
PATH REPORT.RELEVANT HX SPEC: NORMAL
PLATELET # BLD AUTO: 214 THOU/UL (ref 140–440)
PMV BLD AUTO: 11.5 FL (ref 8.5–12.5)
POTASSIUM BLD-SCNC: 4.1 MMOL/L (ref 3.5–5)
PROT SERPL-MCNC: 6.6 G/DL (ref 6–8)
RBC # BLD AUTO: 3.82 MILL/UL (ref 3.8–5.4)
SODIUM SERPL-SCNC: 139 MMOL/L (ref 136–145)
SPECIMEN DESCRIPTION: ABNORMAL
TIBC SERPL-MCNC: 323 UG/DL (ref 313–563)
TRANSFERRIN SERPL-MCNC: 259 MG/DL (ref 212–360)
WBC: 6.4 THOU/UL (ref 4–11)

## 2020-05-04 ENCOUNTER — RECORDS - HEALTHEAST (OUTPATIENT)
Dept: LAB | Facility: CLINIC | Age: 70
End: 2020-05-04

## 2020-05-04 LAB
ALBUMIN SERPL-MCNC: 3.8 G/DL (ref 3.5–5)
ALP SERPL-CCNC: 66 U/L (ref 45–120)
ALT SERPL W P-5'-P-CCNC: 26 U/L (ref 0–45)
ANION GAP SERPL CALCULATED.3IONS-SCNC: 7 MMOL/L (ref 5–18)
AST SERPL W P-5'-P-CCNC: 14 U/L (ref 0–40)
BILIRUB SERPL-MCNC: 0.4 MG/DL (ref 0–1)
BUN SERPL-MCNC: 26 MG/DL (ref 8–22)
CALCIUM SERPL-MCNC: 9.3 MG/DL (ref 8.5–10.5)
CHLORIDE BLD-SCNC: 103 MMOL/L (ref 98–107)
CHOLEST SERPL-MCNC: 153 MG/DL
CO2 SERPL-SCNC: 28 MMOL/L (ref 22–31)
CREAT SERPL-MCNC: 0.99 MG/DL (ref 0.6–1.1)
FASTING STATUS PATIENT QL REPORTED: YES
GFR SERPL CREATININE-BSD FRML MDRD: 56 ML/MIN/1.73M2
GLUCOSE BLD-MCNC: 300 MG/DL (ref 70–125)
HDLC SERPL-MCNC: 44 MG/DL
LDLC SERPL CALC-MCNC: 89 MG/DL
POTASSIUM BLD-SCNC: 4.6 MMOL/L (ref 3.5–5)
PROT SERPL-MCNC: 6.9 G/DL (ref 6–8)
SODIUM SERPL-SCNC: 138 MMOL/L (ref 136–145)
TRIGL SERPL-MCNC: 99 MG/DL

## 2020-08-05 ENCOUNTER — RECORDS - HEALTHEAST (OUTPATIENT)
Dept: LAB | Facility: CLINIC | Age: 70
End: 2020-08-05

## 2020-08-05 LAB
CREAT UR-MCNC: 88.7 MG/DL
MICROALBUMIN UR-MCNC: 50.28 MG/DL (ref 0–1.99)
MICROALBUMIN/CREAT UR: 566.9 MG/G

## 2021-05-30 ENCOUNTER — RECORDS - HEALTHEAST (OUTPATIENT)
Dept: ADMINISTRATIVE | Facility: CLINIC | Age: 71
End: 2021-05-30

## 2021-06-01 VITALS — BODY MASS INDEX: 29.02 KG/M2 | WEIGHT: 163.8 LBS

## 2021-06-14 ENCOUNTER — RECORDS - HEALTHEAST (OUTPATIENT)
Dept: LAB | Facility: CLINIC | Age: 71
End: 2021-06-14

## 2021-06-14 LAB
ANION GAP SERPL CALCULATED.3IONS-SCNC: 10 MMOL/L (ref 5–18)
BUN SERPL-MCNC: 31 MG/DL (ref 8–28)
CALCIUM SERPL-MCNC: 9.5 MG/DL (ref 8.5–10.5)
CHLORIDE BLD-SCNC: 104 MMOL/L (ref 98–107)
CHOLEST SERPL-MCNC: 153 MG/DL
CO2 SERPL-SCNC: 25 MMOL/L (ref 22–31)
CREAT SERPL-MCNC: 1.19 MG/DL (ref 0.6–1.1)
CREAT UR-MCNC: 94.8 MG/DL
FASTING STATUS PATIENT QL REPORTED: ABNORMAL
GFR SERPL CREATININE-BSD FRML MDRD: 45 ML/MIN/1.73M2
GLUCOSE BLD-MCNC: 238 MG/DL (ref 70–125)
HDLC SERPL-MCNC: 39 MG/DL
LDLC SERPL CALC-MCNC: 88 MG/DL
MICROALBUMIN UR-MCNC: 32.26 MG/DL (ref 0–1.99)
MICROALBUMIN/CREAT UR: 340.3 MG/G
POTASSIUM BLD-SCNC: 5.2 MMOL/L (ref 3.5–5)
SODIUM SERPL-SCNC: 139 MMOL/L (ref 136–145)
TRIGL SERPL-MCNC: 128 MG/DL

## 2021-06-15 ENCOUNTER — RECORDS - HEALTHEAST (OUTPATIENT)
Dept: LAB | Facility: CLINIC | Age: 71
End: 2021-06-15

## 2021-06-16 LAB — 25(OH)D3 SERPL-MCNC: 17.4 NG/ML (ref 30–80)

## 2021-06-20 NOTE — PROGRESS NOTES
Cardiac Rehab  Phase II Assessment    Assessment Date: 8/22/18    Diagnosis: STEMI, MANA x 1 OM1, MANA x 1 dRCA  Date of Onset: 8/7/18  ICD/Pacemaker: No Parameters: NA  Post-op Complications: None  ECG History: Sinus rhythm EF%:40-45% echo 8/8/18  Past Medical History: Diabetes Mellitus Type II, hypertension, asthma    Physical Assessment  Precautions/ Physical Limitations: bilateral knee and hip pain  Oxygen: No  O2 Sats: 98% Lung Sounds: clear Edema: 3+ R and L ankle  Incisions: NA  Sleeping Pattern: poor  Appetite: fair   Nutrition Risk Screen: completed, no needs at this time    Pain  Location:  left sided ache on the side of left breast near axilla.  Characteristics:ache  Intensity: (0-10 scale) 5  Current Pain Management: rest and NTG PRN  Intervention:   Response: baseline is #4      Pain  Location: bilateral hip and knee pain,  Characteristics:ache  Intensity: (0-10 scale) 4/5  Current Pain Management: ibuprofen  Intervention:   Response: baseline is #4    Psychosocial/ Emotional Health  1. In the past 12 months, have you been in a relationship where you have been abused physically, emotionally, sexually or financially? No  notified: NA  2. Who do you turn to for emotional support?: , 3 children  3. Do you have cultural or spiritual needs? No  4. Have there been any major life changes in the past 12 months? No    Referral Information  Primary Physician: Oly Marte MD  Cardiologist: Twyla Steele MD  Surgeon: NA    Home exercise/Equipment: None    Patient's long-term goal(s): Patient will increase her strength and endurance and establish a regular home exercise program.    1. Living Accommodations: Apartment Steps: Yes (a flight of 6 and a flight of 10, landing in between).      Support people at home:    2. Marital Status:   3. Family is able to assist with cares      [employer]/Community involvement: NA  4. Recreation/Hobbies: crocheting, sewing

## 2021-06-20 NOTE — PROGRESS NOTES
ITP ASSESSMENT   Assessment Day: Initial  Session Number: 1 of 18-36  Precautions: Standard cardiac, diabetic  Diagnosis: MI;Stent  Risk Stratification: High  Referring Provider: Gaurang Mayberry MD  EXERCISE  Exercise Assessment: Initial     6 Minute Walk Test   Pre   Pre Exercise HR: 68                  Pre Exercise BP: 140/80    Peak  Peak HR: 82                 Peak BP: 160/72  Peak feet: 720  Peak O2 SAT: 98  Peak RPE: 14  Peak MPH: 1.36    Symptoms:  Peak Symptoms: 6/10 left sided ache side of left breast    5 mins. Post  5 Min Post HR: 66  5 Min Post BP: 138/78                         Exercise Plan  Goals Next 30 days  ADL'S: Pt will be able to climb 10 stairs without any SOB.  Leisure: Pt will be able to mop with less fatigue.  Work: Pt will establish a regular home exercise program of walking and swimming at [employer].    Education Goals: All goals in this section met  Education Goals Met: Patient can state cardiac s/s and appropriate emergency response.;Has system for taking medication.;Medication review.                        Goals Met    Exercise Prescription  Exercise Mode: Treadmill;Bike;Nustep;Arm Erg.  Frequency: 3x/week  Duration: 30-45 minutes  Intensity / THR: 20-30 beats above resting heart rate  RPE 11-14  Progression / Met level: 1.5-2.5  Resistive Training?: No    Current Exercise (mins/week): 1    Interventions  Home Exercise:    Education Material : Educational videos;Provide written material;Individual education and counseling    Education Completed  Exercise Education Completed: Cardiac Anatomy;Signs and Symptoms;Medication review;RPE;Emergency Plan;Home Exercise;Warm up/cool down;FITT Principles;BP/HR Reponse to exercise;Benefits of Exercise;End point of exercise            Exercise Follow-up/Discharge  Follow up/Discharge: Pt is currently not exercising. Patient would like to go to [employer] to swim. Patient has not swam in a long time. Goal would be to swim for 10-15 minutes to  "start with. Patient would like to start walking 5-10 minutes 2x/day. Patient is limited by left sided breast pain which is her baseline for now.Patient is also limited by bilateral knee and hip pain.          NUTRITION  Nutrition Assessment: Initial    Nutrition Risk Factors:  Nutrition Risk Factors: Diabetes;Dyslipidemia;Overweight  HbA1c: 12.3 (8/7/18)  Monitors blood sugar at home: No (glucometer will be coming in the mail soon)  Cholesterol: 181 (8/8/18)  LDL: 125  HDL: 34  Triglycerides: 108    Nutrition Plan  Interventions  Other Nutrition Intervention: DietClass;Therapist/Pt Discussion;Educational Videos;Provide with Written Material;Referral to DM education    Education Completed  Nutrition Education Completed: Low Saturated fat diet;Risk factor overview;Low sodium diet    Goals  Nutrition Goals (Next 30 days): Patient can identify their risk factors for CAD;Patient will follow a low sodium diet;Review Dietitian schedule;Patient will follow a low saturated fat diet;Patient will lose weight    Goals Met  Nutrition Goals Met: Patient knows appropriate portion size    Height, Weight, and  BMI  Weight: 163 lb 12.8 oz (74.3 kg)  Height: 5' 4\" (1.626 m)  BMI: 28.1    Nutrition Follow-up  Follow-up/Discharge: Patient is following a heart healthy diet the majority of the time. Patient eats mainly chicken and vegetables. However, patient has eating canned vegetables that are no low sodium. Discussed the importance of reducing sodium and trying frozen vegetables instead. Patient is eating small portion sizes.     Other Risk Factors  Other Risk Factor Assessment: Initial    HTN Risk Factor: Hypertension    Pre Exercise BP: 140/80  Post Exercise BP: 138/78    Hypertension Plan  Goals  HTN Goals: Follow low sodium diet;Take medication as prescribed;Exercises regularly    Goals Met  HTN Goals Met: Take medication as prescribed    HTN Interventions  HTN Interventions: Diet consult;Therapist/patient discussion;Provide " written material;Offer educational videos    HTN Education Completed  HTN Education Completed: Low sodium diet;Medication review;Risk factor overview    Tobacco Risk Factor: NA         PSYCHOSOCIAL  Psychosocial Assessment: Initial     [facility] Q of L Summary Score: 16    AYDIN-D Score: 10    Psychosocial Risk Factor: NA    Psychosocial Plan  Interventions  Interventions: Offer educational videos and classes;Provide written material;Individual education and counseling    Education Completed  Education Completed: Relaxation/Coping Techniques;S/S of depression    Goals  Goals (Next 30 days): Identify stressors    Goals Met  Goals Met: Identified Support system    Psychosocial Follow-up  Follow-up/Discharge: Patient stated that she has a low stress life. Patient enjoys crocheting, sewing and doing art.         Patient involved in Goal setting?: Yes    Signature: _____________________________________________________________    Date: __________________    Time: __________________

## 2021-06-20 NOTE — PROGRESS NOTES
ITP ASSESSMENT /DISCHARGE  Assessment Day: 30 Day  Session Number: 1  Precautions: standard cardiac  Diagnosis: MI;Stent  Risk Stratification: High  Referring Provider: Gaurang Mayberry MD  EXERCISE  Exercise Assessment: Discharge     6 Minute Walk Test   Pre   Pre Exercise HR: 68                  Pre Exercise BP: 140/80    Peak  Peak HR: 82                 Peak BP: 160/72  Peak feet: 720  Peak O2 SAT: 98  Peak RPE: 14  Peak MPH: 1.36    Symptoms:  Peak Symptoms: 6/10 left sided ache side of left breast    5 mins. Post  5 Min Post HR: 66  5 Min Post BP: 138/78                         Exercise   Education Goals: All goals in this section met  Education Goals Met: Patient can state cardiac s/s and appropriate emergency response.;Has system for taking medication.;Medication review.                        Goals Met  Initial ADL's goals met: Goals not met as did not return after first session.  Initial Leisure goals met: Goals not met as did not return after first session.  Intial Work goals met: Goals not met as did not return after initial session.  Initial Progression: Patient seen for initial session only.    Current Exercise (mins/week): 1      Education Material : Educational videos;Provide written material;Individual education and counseling              Exercise Follow-up/Discharge  Follow up/Discharge: Patient did not return after initial session.  Had a staged PCI scheduled for 9/13/2018 at [facility].  Home program not sent as seen for only 1 session and was not exercising prior to this session.         NUTRITION  Nutrition Assessment: Discharge    Nutrition Risk Factors:  Nutrition Risk Factors: Diabetes;Dyslipidemia;Overweight  HbA1c: 12.3  Monitors blood sugar at home: No (as previously stated)  Cholesterol: 181  LDL: 125  HDL: 34  Triglycerides: 108    Nutrition Plan  Interventions    Other Nutrition Intervention: Diet Class;Therapist/Pt Discussion;Educational Videos;Provide with Written Material;Referral to  "DM education        Goals Met  Nutrition Goals Met: Patient knows appropriate portion size    Height, Weight, and  BMI  Weight: 163 lb 12.8 oz (74.3 kg)  Height: 5' 4\" (1.626 m)  BMI: 28.1    Nutrition Follow-up  Follow-up/Discharge: Goals not met as did not return after initial session.     Other Risk Factors  Other Risk Factor Assessment: Discharge    HTN Risk Factor: Hypertension    Pre Exercise BP: 140/80  Post Exercise BP: 138/78    Hypertension Plan  Goals  HTN Goals: Follow low sodium diet;Take medication as prescribed;Exercises regularly    Goals Met  HTN Goals Met: Take medication as prescribed    HTN Interventions  HTN Interventions: Diet consult;Therapist/patient discussion;Provide written material;Offer educational videos    HTN Education Completed  HTN Education Completed: Low sodium diet;Medication review;Risk factor overview    Tobacco Risk Factor: NA        Risk Factor Follow-up   Follow-up/Discharge: Goals not met as did not return after initial session.       PSYCHOSOCIAL  Psychosocial Assessment: Discharge       Psychosocial Plan    Goals Met  Goals Met: Identified Support system    Psychosocial Follow-up  Follow-up/Discharge: Goals not met as did not return after initial session.         Patient involved in Goal setting?: No (Patient never returned after initial session.)    Signature: _____________________________________________________________    Date: __________________    Time: __________________  "

## 2022-01-19 ENCOUNTER — LAB REQUISITION (OUTPATIENT)
Dept: LAB | Facility: CLINIC | Age: 72
End: 2022-01-19

## 2022-01-19 ENCOUNTER — LAB REQUISITION (OUTPATIENT)
Dept: LAB | Facility: CLINIC | Age: 72
End: 2022-01-19
Payer: COMMERCIAL

## 2022-01-19 DIAGNOSIS — R35.0 FREQUENCY OF MICTURITION: ICD-10-CM

## 2022-01-19 DIAGNOSIS — I10 ESSENTIAL (PRIMARY) HYPERTENSION: ICD-10-CM

## 2022-01-19 LAB
ANION GAP SERPL CALCULATED.3IONS-SCNC: 12 MMOL/L (ref 5–18)
BUN SERPL-MCNC: 24 MG/DL (ref 8–28)
CALCIUM SERPL-MCNC: 9.1 MG/DL (ref 8.5–10.5)
CHLORIDE BLD-SCNC: 104 MMOL/L (ref 98–107)
CO2 SERPL-SCNC: 21 MMOL/L (ref 22–31)
CREAT SERPL-MCNC: 1.16 MG/DL (ref 0.6–1.1)
GFR SERPL CREATININE-BSD FRML MDRD: 50 ML/MIN/1.73M2
GLUCOSE BLD-MCNC: 336 MG/DL (ref 70–125)
POTASSIUM BLD-SCNC: 4.5 MMOL/L (ref 3.5–5)
SODIUM SERPL-SCNC: 137 MMOL/L (ref 136–145)

## 2022-01-19 PROCEDURE — 87086 URINE CULTURE/COLONY COUNT: CPT | Mod: ORL | Performed by: FAMILY MEDICINE

## 2022-01-19 PROCEDURE — 80048 BASIC METABOLIC PNL TOTAL CA: CPT | Mod: ORL | Performed by: FAMILY MEDICINE

## 2022-01-19 PROCEDURE — 82306 VITAMIN D 25 HYDROXY: CPT | Mod: ORL | Performed by: FAMILY MEDICINE

## 2022-01-20 LAB — DEPRECATED CALCIDIOL+CALCIFEROL SERPL-MC: 16 UG/L (ref 30–80)

## 2022-01-21 LAB — BACTERIA UR CULT: ABNORMAL

## 2022-02-23 ENCOUNTER — LAB REQUISITION (OUTPATIENT)
Dept: LAB | Facility: CLINIC | Age: 72
End: 2022-02-23
Payer: COMMERCIAL

## 2022-02-23 DIAGNOSIS — B97.7 PAPILLOMAVIRUS AS THE CAUSE OF DISEASES CLASSIFIED ELSEWHERE: ICD-10-CM

## 2022-02-23 PROCEDURE — G0123 SCREEN CERV/VAG THIN LAYER: HCPCS | Mod: ORL | Performed by: FAMILY MEDICINE

## 2022-02-23 PROCEDURE — 87624 HPV HI-RISK TYP POOLED RSLT: CPT | Mod: ORL | Performed by: FAMILY MEDICINE

## 2022-02-25 LAB
HUMAN PAPILLOMA VIRUS 16 DNA: NEGATIVE
HUMAN PAPILLOMA VIRUS 18 DNA: NEGATIVE
HUMAN PAPILLOMA VIRUS FINAL DIAGNOSIS: ABNORMAL
HUMAN PAPILLOMA VIRUS OTHER HR: POSITIVE

## 2022-03-02 LAB
BKR LAB AP GYN ADEQUACY: NORMAL
BKR LAB AP GYN INTERPRETATION: NORMAL
BKR LAB AP HPV REFLEX: NORMAL
BKR LAB AP LMP: NORMAL
BKR LAB AP PREVIOUS ABNORMAL: NORMAL
PATH REPORT.COMMENTS IMP SPEC: NORMAL
PATH REPORT.COMMENTS IMP SPEC: NORMAL
PATH REPORT.RELEVANT HX SPEC: NORMAL

## 2022-04-06 ENCOUNTER — LAB REQUISITION (OUTPATIENT)
Dept: LAB | Facility: CLINIC | Age: 72
End: 2022-04-06
Payer: COMMERCIAL

## 2022-04-06 DIAGNOSIS — I10 ESSENTIAL (PRIMARY) HYPERTENSION: ICD-10-CM

## 2022-04-06 LAB
ANION GAP SERPL CALCULATED.3IONS-SCNC: 8 MMOL/L (ref 5–18)
BUN SERPL-MCNC: 32 MG/DL (ref 8–28)
CALCIUM SERPL-MCNC: 9.7 MG/DL (ref 8.5–10.5)
CHLORIDE BLD-SCNC: 102 MMOL/L (ref 98–107)
CO2 SERPL-SCNC: 27 MMOL/L (ref 22–31)
CREAT SERPL-MCNC: 1.38 MG/DL (ref 0.6–1.1)
GFR SERPL CREATININE-BSD FRML MDRD: 41 ML/MIN/1.73M2
GLUCOSE BLD-MCNC: 337 MG/DL (ref 70–125)
POTASSIUM BLD-SCNC: 5.1 MMOL/L (ref 3.5–5)
SODIUM SERPL-SCNC: 137 MMOL/L (ref 136–145)

## 2022-04-06 PROCEDURE — 80048 BASIC METABOLIC PNL TOTAL CA: CPT | Mod: ORL | Performed by: FAMILY MEDICINE

## 2022-08-12 ENCOUNTER — LAB REQUISITION (OUTPATIENT)
Dept: LAB | Facility: CLINIC | Age: 72
End: 2022-08-12
Payer: COMMERCIAL

## 2022-08-12 DIAGNOSIS — E55.9 VITAMIN D DEFICIENCY, UNSPECIFIED: ICD-10-CM

## 2022-08-12 DIAGNOSIS — I25.10 ATHEROSCLEROTIC HEART DISEASE OF NATIVE CORONARY ARTERY WITHOUT ANGINA PECTORIS: ICD-10-CM

## 2022-08-12 DIAGNOSIS — I10 ESSENTIAL (PRIMARY) HYPERTENSION: ICD-10-CM

## 2022-08-12 LAB
ANION GAP SERPL CALCULATED.3IONS-SCNC: 12 MMOL/L (ref 7–15)
BUN SERPL-MCNC: 31.8 MG/DL (ref 8–23)
CALCIUM SERPL-MCNC: 9.4 MG/DL (ref 8.8–10.2)
CHLORIDE SERPL-SCNC: 102 MMOL/L (ref 98–107)
CHOLEST SERPL-MCNC: 147 MG/DL
CREAT SERPL-MCNC: 1.14 MG/DL (ref 0.51–0.95)
DEPRECATED HCO3 PLAS-SCNC: 24 MMOL/L (ref 22–29)
GFR SERPL CREATININE-BSD FRML MDRD: 51 ML/MIN/1.73M2
GLUCOSE SERPL-MCNC: 365 MG/DL (ref 70–99)
HDLC SERPL-MCNC: 43 MG/DL
LDLC SERPL CALC-MCNC: 72 MG/DL
NONHDLC SERPL-MCNC: 104 MG/DL
POTASSIUM SERPL-SCNC: 5 MMOL/L (ref 3.4–5.3)
SODIUM SERPL-SCNC: 138 MMOL/L (ref 136–145)
TRIGL SERPL-MCNC: 160 MG/DL

## 2022-08-12 PROCEDURE — 80048 BASIC METABOLIC PNL TOTAL CA: CPT | Mod: ORL | Performed by: FAMILY MEDICINE

## 2022-08-12 PROCEDURE — 82306 VITAMIN D 25 HYDROXY: CPT | Mod: ORL | Performed by: FAMILY MEDICINE

## 2022-08-12 PROCEDURE — 80061 LIPID PANEL: CPT | Mod: ORL | Performed by: FAMILY MEDICINE

## 2022-08-13 LAB — DEPRECATED CALCIDIOL+CALCIFEROL SERPL-MC: 25 UG/L (ref 20–75)

## 2022-09-08 ENCOUNTER — APPOINTMENT (OUTPATIENT)
Dept: CT IMAGING | Facility: HOSPITAL | Age: 72
DRG: 300 | End: 2022-09-08
Attending: EMERGENCY MEDICINE
Payer: COMMERCIAL

## 2022-09-08 ENCOUNTER — HOSPITAL ENCOUNTER (INPATIENT)
Facility: HOSPITAL | Age: 72
LOS: 9 days | Discharge: HOME | DRG: 300 | End: 2022-09-17
Attending: EMERGENCY MEDICINE | Admitting: INTERNAL MEDICINE
Payer: COMMERCIAL

## 2022-09-08 DIAGNOSIS — N17.9 ACUTE RENAL FAILURE, UNSPECIFIED ACUTE RENAL FAILURE TYPE (H): ICD-10-CM

## 2022-09-08 DIAGNOSIS — R10.13 ABDOMINAL PAIN, EPIGASTRIC: Primary | ICD-10-CM
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
## 2022-09-08 DIAGNOSIS — R19.7 DIARRHEA, UNSPECIFIED TYPE: ICD-10-CM

## 2022-09-08 DIAGNOSIS — I10 PRIMARY HYPERTENSION: ICD-10-CM

## 2022-09-08 DIAGNOSIS — R07.9 CHEST PAIN, UNSPECIFIED TYPE: ICD-10-CM

## 2022-09-08 DIAGNOSIS — E11.9 TYPE 2 DIABETES MELLITUS WITHOUT COMPLICATION, WITH LONG-TERM CURRENT USE OF INSULIN (H): ICD-10-CM

## 2022-09-08 DIAGNOSIS — S35.229A: ICD-10-CM

## 2022-09-08 DIAGNOSIS — Z79.4 TYPE 2 DIABETES MELLITUS WITHOUT COMPLICATION, WITH LONG-TERM CURRENT USE OF INSULIN (H): ICD-10-CM

## 2022-09-08 LAB
ALBUMIN SERPL-MCNC: 3.8 G/DL (ref 3.5–5)
ALBUMIN UR-MCNC: 70 MG/DL
ALP SERPL-CCNC: 60 U/L (ref 45–120)
ALT SERPL W P-5'-P-CCNC: 14 U/L (ref 0–45)
ANION GAP SERPL CALCULATED.3IONS-SCNC: 10 MMOL/L (ref 5–18)
APPEARANCE UR: CLEAR
AST SERPL W P-5'-P-CCNC: 13 U/L (ref 0–40)
BASOPHILS # BLD AUTO: 0.1 10E3/UL (ref 0–0.2)
BASOPHILS NFR BLD AUTO: 1 %
BILIRUB SERPL-MCNC: 0.7 MG/DL (ref 0–1)
BILIRUB UR QL STRIP: NEGATIVE
BUN SERPL-MCNC: 40 MG/DL (ref 8–28)
CALCIUM SERPL-MCNC: 9 MG/DL (ref 8.5–10.5)
CHLORIDE BLD-SCNC: 103 MMOL/L (ref 98–107)
CO2 SERPL-SCNC: 24 MMOL/L (ref 22–31)
COLOR UR AUTO: ABNORMAL
CREAT SERPL-MCNC: 2 MG/DL (ref 0.6–1.1)
EOSINOPHIL # BLD AUTO: 0.1 10E3/UL (ref 0–0.7)
EOSINOPHIL NFR BLD AUTO: 2 %
ERYTHROCYTE [DISTWIDTH] IN BLOOD BY AUTOMATED COUNT: 12 % (ref 10–15)
GFR SERPL CREATININE-BSD FRML MDRD: 26 ML/MIN/1.73M2
GLUCOSE BLD-MCNC: 258 MG/DL (ref 70–125)
GLUCOSE UR STRIP-MCNC: 70 MG/DL
HCT VFR BLD AUTO: 37.1 % (ref 35–47)
HGB BLD-MCNC: 11.9 G/DL (ref 11.7–15.7)
HGB UR QL STRIP: ABNORMAL
HYALINE CASTS: 3 /LPF
IMM GRANULOCYTES # BLD: 0 10E3/UL
IMM GRANULOCYTES NFR BLD: 0 %
KETONES UR STRIP-MCNC: NEGATIVE MG/DL
LACTATE SERPL-SCNC: 1.3 MMOL/L (ref 0.7–2)
LEUKOCYTE ESTERASE UR QL STRIP: NEGATIVE
LIPASE SERPL-CCNC: <9 U/L (ref 0–52)
LYMPHOCYTES # BLD AUTO: 1.5 10E3/UL (ref 0.8–5.3)
LYMPHOCYTES NFR BLD AUTO: 23 %
MAGNESIUM SERPL-MCNC: 1.9 MG/DL (ref 1.8–2.6)
MCH RBC QN AUTO: 30.1 PG (ref 26.5–33)
MCHC RBC AUTO-ENTMCNC: 32.1 G/DL (ref 31.5–36.5)
MCV RBC AUTO: 94 FL (ref 78–100)
MONOCYTES # BLD AUTO: 0.4 10E3/UL (ref 0–1.3)
MONOCYTES NFR BLD AUTO: 7 %
NEUTROPHILS # BLD AUTO: 4.6 10E3/UL (ref 1.6–8.3)
NEUTROPHILS NFR BLD AUTO: 67 %
NITRATE UR QL: NEGATIVE
NRBC # BLD AUTO: 0 10E3/UL
NRBC BLD AUTO-RTO: 0 /100
PH UR STRIP: 5.5 [PH] (ref 5–7)
PLATELET # BLD AUTO: 200 10E3/UL (ref 150–450)
POTASSIUM BLD-SCNC: 4.2 MMOL/L (ref 3.5–5)
PROT SERPL-MCNC: 7.2 G/DL (ref 6–8)
RADIOLOGIST FLAGS: ABNORMAL
RBC # BLD AUTO: 3.96 10E6/UL (ref 3.8–5.2)
RBC URINE: 1 /HPF
SARS-COV-2 RNA RESP QL NAA+PROBE: NEGATIVE
SODIUM SERPL-SCNC: 137 MMOL/L (ref 136–145)
SP GR UR STRIP: 1.04 (ref 1–1.03)
SQUAMOUS EPITHELIAL: 1 /HPF
TROPONIN I SERPL-MCNC: 0.01 NG/ML (ref 0–0.29)
TROPONIN I SERPL-MCNC: 0.02 NG/ML (ref 0–0.29)
UROBILINOGEN UR STRIP-MCNC: <2 MG/DL
WBC # BLD AUTO: 6.7 10E3/UL (ref 4–11)
WBC URINE: 5 /HPF

## 2022-09-08 PROCEDURE — 93005 ELECTROCARDIOGRAM TRACING: CPT | Performed by: EMERGENCY MEDICINE

## 2022-09-08 PROCEDURE — U0005 INFEC AGEN DETEC AMPLI PROBE: HCPCS | Performed by: EMERGENCY MEDICINE

## 2022-09-08 PROCEDURE — 250N000011 HC RX IP 250 OP 636: Performed by: EMERGENCY MEDICINE

## 2022-09-08 PROCEDURE — 99223 1ST HOSP IP/OBS HIGH 75: CPT | Mod: GC | Performed by: INTERNAL MEDICINE

## 2022-09-08 PROCEDURE — 96361 HYDRATE IV INFUSION ADD-ON: CPT

## 2022-09-08 PROCEDURE — 36415 COLL VENOUS BLD VENIPUNCTURE: CPT | Performed by: EMERGENCY MEDICINE

## 2022-09-08 PROCEDURE — 72128 CT CHEST SPINE W/O DYE: CPT

## 2022-09-08 PROCEDURE — 81003 URINALYSIS AUTO W/O SCOPE: CPT | Performed by: EMERGENCY MEDICINE

## 2022-09-08 PROCEDURE — 84484 ASSAY OF TROPONIN QUANT: CPT | Performed by: EMERGENCY MEDICINE

## 2022-09-08 PROCEDURE — 99285 EMERGENCY DEPT VISIT HI MDM: CPT | Mod: 25

## 2022-09-08 PROCEDURE — 258N000003 HC RX IP 258 OP 636: Performed by: EMERGENCY MEDICINE

## 2022-09-08 PROCEDURE — 99207 PR NOT IN PERSON INPATIENT CONSULT STATISTICAL MARKER: CPT | Performed by: SURGERY

## 2022-09-08 PROCEDURE — 85025 COMPLETE CBC W/AUTO DIFF WBC: CPT | Performed by: EMERGENCY MEDICINE

## 2022-09-08 PROCEDURE — 80053 COMPREHEN METABOLIC PANEL: CPT | Performed by: EMERGENCY MEDICINE

## 2022-09-08 PROCEDURE — 83735 ASSAY OF MAGNESIUM: CPT | Performed by: EMERGENCY MEDICINE

## 2022-09-08 PROCEDURE — 83605 ASSAY OF LACTIC ACID: CPT | Performed by: EMERGENCY MEDICINE

## 2022-09-08 PROCEDURE — 74174 CTA ABD&PLVS W/CONTRAST: CPT

## 2022-09-08 PROCEDURE — C9803 HOPD COVID-19 SPEC COLLECT: HCPCS

## 2022-09-08 PROCEDURE — 72125 CT NECK SPINE W/O DYE: CPT

## 2022-09-08 PROCEDURE — 70450 CT HEAD/BRAIN W/O DYE: CPT

## 2022-09-08 PROCEDURE — 96374 THER/PROPH/DIAG INJ IV PUSH: CPT | Mod: 59

## 2022-09-08 PROCEDURE — 83690 ASSAY OF LIPASE: CPT | Performed by: EMERGENCY MEDICINE

## 2022-09-08 PROCEDURE — 210N000001 HC R&B IMCU HEART CARE

## 2022-09-08 PROCEDURE — 250N000013 HC RX MED GY IP 250 OP 250 PS 637: Performed by: EMERGENCY MEDICINE

## 2022-09-08 RX ORDER — ASPIRIN 81 MG/1
81 TABLET ORAL DAILY
COMMUNITY

## 2022-09-08 RX ORDER — METOPROLOL SUCCINATE 100 MG/1
200 TABLET, EXTENDED RELEASE ORAL DAILY
Status: DISCONTINUED | OUTPATIENT
Start: 2022-09-09 | End: 2022-09-17 | Stop reason: HOSPADM

## 2022-09-08 RX ORDER — NITROGLYCERIN 0.4 MG/1
0.4 TABLET SUBLINGUAL EVERY 5 MIN PRN
Status: DISCONTINUED | OUTPATIENT
Start: 2022-09-08 | End: 2022-09-17 | Stop reason: HOSPADM

## 2022-09-08 RX ORDER — ONDANSETRON 4 MG/1
4 TABLET, ORALLY DISINTEGRATING ORAL EVERY 6 HOURS PRN
Status: DISCONTINUED | OUTPATIENT
Start: 2022-09-08 | End: 2022-09-17 | Stop reason: HOSPADM

## 2022-09-08 RX ORDER — METFORMIN HCL 500 MG
500 TABLET, EXTENDED RELEASE 24 HR ORAL
Status: DISCONTINUED | OUTPATIENT
Start: 2022-09-09 | End: 2022-09-09 | Stop reason: DRUGHIGH

## 2022-09-08 RX ORDER — LIDOCAINE 40 MG/G
CREAM TOPICAL
Status: DISCONTINUED | OUTPATIENT
Start: 2022-09-08 | End: 2022-09-17 | Stop reason: HOSPADM

## 2022-09-08 RX ORDER — ASPIRIN 81 MG/1
81 TABLET ORAL DAILY
Status: DISCONTINUED | OUTPATIENT
Start: 2022-09-09 | End: 2022-09-14

## 2022-09-08 RX ORDER — LISINOPRIL 20 MG/1
40 TABLET ORAL DAILY
Status: DISCONTINUED | OUTPATIENT
Start: 2022-09-09 | End: 2022-09-17 | Stop reason: HOSPADM

## 2022-09-08 RX ORDER — ACETAMINOPHEN 500 MG
500-1000 TABLET ORAL EVERY 6 HOURS PRN
Status: DISCONTINUED | OUTPATIENT
Start: 2022-09-08 | End: 2022-09-13

## 2022-09-08 RX ORDER — GLIPIZIDE 10 MG/1
10 TABLET ORAL
Status: DISCONTINUED | OUTPATIENT
Start: 2022-09-09 | End: 2022-09-09

## 2022-09-08 RX ORDER — POLYETHYLENE GLYCOL 3350 17 G/17G
17 POWDER, FOR SOLUTION ORAL DAILY
Status: DISCONTINUED | OUTPATIENT
Start: 2022-09-09 | End: 2022-09-17

## 2022-09-08 RX ORDER — ONDANSETRON 2 MG/ML
4 INJECTION INTRAMUSCULAR; INTRAVENOUS EVERY 6 HOURS PRN
Status: DISCONTINUED | OUTPATIENT
Start: 2022-09-08 | End: 2022-09-17 | Stop reason: HOSPADM

## 2022-09-08 RX ORDER — CHOLECALCIFEROL (VITAMIN D3) 50 MCG
1 TABLET ORAL DAILY
COMMUNITY

## 2022-09-08 RX ORDER — NICOTINE POLACRILEX 4 MG
15-30 LOZENGE BUCCAL
Status: DISCONTINUED | OUTPATIENT
Start: 2022-09-08 | End: 2022-09-17 | Stop reason: HOSPADM

## 2022-09-08 RX ORDER — ASPIRIN 81 MG/1
81 TABLET ORAL ONCE
Status: COMPLETED | OUTPATIENT
Start: 2022-09-08 | End: 2022-09-08

## 2022-09-08 RX ORDER — HYDRALAZINE HYDROCHLORIDE 20 MG/ML
10 INJECTION INTRAMUSCULAR; INTRAVENOUS ONCE
Status: COMPLETED | OUTPATIENT
Start: 2022-09-08 | End: 2022-09-08

## 2022-09-08 RX ORDER — NITROGLYCERIN 0.4 MG/1
0.4 TABLET SUBLINGUAL EVERY 5 MIN PRN
Status: DISCONTINUED | OUTPATIENT
Start: 2022-09-08 | End: 2022-09-09

## 2022-09-08 RX ORDER — ONDANSETRON 4 MG/1
4 TABLET, ORALLY DISINTEGRATING ORAL ONCE
Status: COMPLETED | OUTPATIENT
Start: 2022-09-08 | End: 2022-09-08

## 2022-09-08 RX ORDER — ALENDRONATE SODIUM 70 MG/1
70 TABLET ORAL
COMMUNITY

## 2022-09-08 RX ORDER — ATORVASTATIN CALCIUM 10 MG/1
10 TABLET, FILM COATED ORAL EVERY EVENING
Status: DISCONTINUED | OUTPATIENT
Start: 2022-09-08 | End: 2022-09-08

## 2022-09-08 RX ORDER — PANTOPRAZOLE SODIUM 20 MG/1
20 TABLET, DELAYED RELEASE ORAL DAILY
Status: DISCONTINUED | OUTPATIENT
Start: 2022-09-09 | End: 2022-09-13

## 2022-09-08 RX ORDER — GLIPIZIDE 10 MG/1
10 TABLET ORAL
COMMUNITY

## 2022-09-08 RX ORDER — IPRATROPIUM BROMIDE AND ALBUTEROL SULFATE 2.5; .5 MG/3ML; MG/3ML
1 SOLUTION RESPIRATORY (INHALATION) EVERY 6 HOURS PRN
Status: DISCONTINUED | OUTPATIENT
Start: 2022-09-08 | End: 2022-09-09

## 2022-09-08 RX ORDER — ROSUVASTATIN CALCIUM 40 MG/1
40 TABLET, COATED ORAL DAILY
COMMUNITY

## 2022-09-08 RX ORDER — IOPAMIDOL 755 MG/ML
75 INJECTION, SOLUTION INTRAVASCULAR ONCE
Status: COMPLETED | OUTPATIENT
Start: 2022-09-08 | End: 2022-09-08

## 2022-09-08 RX ORDER — DEXTROSE MONOHYDRATE 25 G/50ML
25-50 INJECTION, SOLUTION INTRAVENOUS
Status: DISCONTINUED | OUTPATIENT
Start: 2022-09-08 | End: 2022-09-17 | Stop reason: HOSPADM

## 2022-09-08 RX ORDER — METOPROLOL SUCCINATE 200 MG/1
200 TABLET, EXTENDED RELEASE ORAL DAILY
COMMUNITY

## 2022-09-08 RX ORDER — IPRATROPIUM BROMIDE AND ALBUTEROL SULFATE 2.5; .5 MG/3ML; MG/3ML
3 SOLUTION RESPIRATORY (INHALATION) EVERY 4 HOURS PRN
Status: DISCONTINUED | OUTPATIENT
Start: 2022-09-08 | End: 2022-09-17 | Stop reason: HOSPADM

## 2022-09-08 RX ORDER — ISOSORBIDE MONONITRATE 30 MG/1
60 TABLET, EXTENDED RELEASE ORAL DAILY
Status: DISCONTINUED | OUTPATIENT
Start: 2022-09-09 | End: 2022-09-17 | Stop reason: HOSPADM

## 2022-09-08 RX ORDER — ROSUVASTATIN CALCIUM 40 MG/1
40 TABLET, COATED ORAL DAILY
Status: DISCONTINUED | OUTPATIENT
Start: 2022-09-09 | End: 2022-09-17 | Stop reason: HOSPADM

## 2022-09-08 RX ORDER — IPRATROPIUM BROMIDE AND ALBUTEROL SULFATE 2.5; .5 MG/3ML; MG/3ML
1 SOLUTION RESPIRATORY (INHALATION) EVERY 6 HOURS PRN
COMMUNITY

## 2022-09-08 RX ORDER — NITROGLYCERIN 0.4 MG/1
0.4 TABLET SUBLINGUAL EVERY 5 MIN PRN
COMMUNITY

## 2022-09-08 RX ORDER — ISOSORBIDE MONONITRATE 60 MG/1
60 TABLET, EXTENDED RELEASE ORAL DAILY
COMMUNITY

## 2022-09-08 RX ORDER — SENNOSIDES 8.6 MG
650-1300 CAPSULE ORAL EVERY 8 HOURS PRN
Status: DISCONTINUED | OUTPATIENT
Start: 2022-09-08 | End: 2022-09-08 | Stop reason: CLARIF

## 2022-09-08 RX ORDER — LISINOPRIL 40 MG/1
40 TABLET ORAL DAILY
Status: ON HOLD | COMMUNITY
End: 2022-09-16

## 2022-09-08 RX ADMIN — IOPAMIDOL 75 ML: 755 INJECTION, SOLUTION INTRAVENOUS at 18:26

## 2022-09-08 RX ADMIN — SODIUM CHLORIDE 1000 ML: 9 INJECTION, SOLUTION INTRAVENOUS at 19:00

## 2022-09-08 RX ADMIN — Medication 81 MG: at 20:48

## 2022-09-08 RX ADMIN — HYDRALAZINE HYDROCHLORIDE 10 MG: 20 INJECTION, SOLUTION INTRAMUSCULAR; INTRAVENOUS at 22:08

## 2022-09-08 RX ADMIN — ONDANSETRON 4 MG: 4 TABLET, ORALLY DISINTEGRATING ORAL at 16:22

## 2022-09-08 ASSESSMENT — ACTIVITIES OF DAILY LIVING (ADL)
ADLS_ACUITY_SCORE: 35

## 2022-09-08 ASSESSMENT — ENCOUNTER SYMPTOMS
SHORTNESS OF BREATH: 1
CONSTIPATION: 0
BLOOD IN STOOL: 0
HEADACHES: 0
BRUISES/BLEEDS EASILY: 1
DIARRHEA: 1
FEVER: 1
CONFUSION: 0
DYSURIA: 0
NAUSEA: 1
VOMITING: 0
ABDOMINAL PAIN: 1

## 2022-09-08 NOTE — ED PROVIDER NOTES
EMERGENCY DEPARTMENT ENCOUNTER      NAME: Bettina Guerrero  AGE: 72 year old female  YOB: 1950  MRN: [id number]  EVALUATION DATE & TIME: 9/8/2022  3:22 PM    PCP: System, Provider Not In    ED PROVIDER: Josemanuel Eduardo MD        Chief Complaint   Patient presents with     Diarrhea         FINAL IMPRESSION:  1. Diarrhea, unspecified type    2. Unspecified injury of superior mesenteric vein, initial encounter    3. Acute renal failure, unspecified acute renal failure type (H)    4. Fall, initial encounter    5. Chest pain, unspecified type          ED COURSE & MEDICAL DECISION MAKING:    Pertinent Labs & Imaging studies reviewed. (See chart for details)  72 year old female presents to the Emergency Department for evaluation of diarrhea, fall, left-sided chest pain, lower abdominal pain, feeling off    Work-up was initiated by provider in triage.  CT imaging labs and EKG were ordered    EKG x2 without evidence of ACS.  Troponin x2 without evidence ACS.  Given nitroglycerin.     3:30 PM I met with the patient, obtained history, performed an initial exam, and discussed options and plan for diagnostics and treatment here in the ED. PPE worn including N95 mask, surgical gloves, face shield.  7:38 PM Radiology called with abnormal finding on CTA.  7:45 PM I reevaluated the patient.  7:49 PM I spoke with Dr. Quinn, vascular surgeon.  8:12 PM I spoke with the hospitalist Dr. Dorado who accepts the patient.      ED Course as of 09/08/22 2042   Th Sep 08, 2022   1648 Patient presents with a chief complaint of back pain, abdominal pain, diarrhea, chest pain, states he fainted on the toilet at 2:30 in the morning.  Developed chest pain this afternoon   1648 Broad differential including dehydration, anemia, ACS, aortic dissection, pulm emboli, pneumonia, diverticulitis, cystitis, stroke, intracranial hemorrhage, but not limited to   1649 Work-up is initiated in triage included CTA chest and pelvis, labs, EKG   1649 On  "exam she has some thoracic spine tenderness as well as some lower abdominal tenderness.  She has no wheezing or crackles   1649 EKG shows T wave inversion in aVL   1649 Has active chest pain therefore nitroglycerin ordered.  We will hold off on aspirin until CT head is completed to make sure is no intracranial hemorrhage   1649 Urea Nitrogen(!): 40   1649 Creatinine(!): 2.00  Patient does show a prerenal type renal failure picture as well as elevated glucose on labs   1650 IV fluids ordered   1650 WBC: 6.7   1650 Hemoglobin: 11.9   1650 Platelet Count: 200   1713 Lactic Acid: 1.3   1713 Troponin I: 0.01  ACS unlikely.   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  Spoke with vascular recommends aspirin in additon to the plavix, continue statin, blood pressure less than 160, and n.p.o.       At the conclusion of the encounter I discussed the results of all of the tests and the disposition. The questions were answered. The patient or family acknowledged understanding and was agreeable with the care plan.         MEDICATIONS GIVEN IN THE EMERGENCY:  Medications   nitroGLYcerin (NITROSTAT) sublingual tablet 0.4 mg (has no administration in time range)   aspirin EC tablet 81 mg (has no administration in time range)   ondansetron (ZOFRAN ODT) ODT tab 4 mg (4 mg Oral Given 9/8/22 1622)   0.9% sodium chloride BOLUS (1,000 mLs Intravenous New Bag 9/8/22 1900)   iopamidol (ISOVUE-370) solution 75 mL (75 mLs Intravenous Given 9/8/22 1826)       NEW PRESCRIPTIONS STARTED AT TODAY'S ER VISIT  New Prescriptions    No medications on file          =================================================================    HPI    Triage note  \"  Diarrhea x 2 days; pt passed out on toilet in the middle of the night. She feels faint and dizzy today. She endorses sob over the past few days and has been using her inhaler more than usual.     Triage Assessment     Row Name 09/08/22 6913       " "Triage Assessment (Adult)    Airway WDL WDL       Respiratory WDL    Respiratory WDL WDL       Skin Circulation/Temperature WDL    Skin Circulation/Temperature WDL WDL       Cardiac WDL    Cardiac WDL WDL       Peripheral/Neurovascular WDL    Peripheral Neurovascular WDL WDL       Cognitive/Neuro/Behavioral WDL    Cognitive/Neuro/Behavioral WDL WDL              \"      Patient information was obtained from: Patient    Use of : N/A        Bettina Guerrero is a 72 year old female with a pertinent history of diabetes mellitus type 2, hypertension, asthma, hyperlipidemia, CHF, CAD, STEMI, stroke, s/p cholecystectomy, s/p coronary stent placement (x3) who presents to this ED by walk in with her granddaughter for evaluation of chest pain, diarrhea, multiple complaints. Patient endorses ongoing left sided chest pain and feeling \"like there is fluid in there\" for the past year. However, patient reports left sided anterior chest pain for the past 12 hours which she describes as aching. Her pain is not provoked with tenderness to palpation or     Yesterday, patient reports having \"really bad\" diarrhea. This morning at 2:30 AM, patient went to have a bathroom to have a bowel movement and states she syncopized for an unknown amount of time. However, patient reports she later went to her room and saw it was 3:30 AM. Patient has had a total of 4-5 episodes of diarrhea today. She describes her stools as \"estrellita\" colored with some \"phlegm\" in it. No recent antibiotic use or recent travel. She endorses associated mid suprapubic abdominal pain.    Patient currently endorses bilateral posterior shoulder pain, a subjective fever, nausea, nasal congestion with lots of phlegm, feels off balance. Patient was able to ambulate into the ED with a cane which she uses at baseline.    She also reports current shortness of breath and has been needing to use her PRN inhaler more than normal recently.    Denies dysuria or other urinary " "symptoms, vomiting. No recent falls. No other reported complaints at this time.      REVIEW OF SYSTEMS   Review of Systems   Constitutional: Positive for fever (subjective).   HENT: Positive for congestion.    Respiratory: Positive for shortness of breath.    Cardiovascular: Positive for chest pain (left sided).   Gastrointestinal: Positive for abdominal pain (mid suprapubic pain), diarrhea and nausea. Negative for blood in stool, constipation and vomiting.        Positive for \"phlegm\" in stool.   Genitourinary: Negative.  Negative for dysuria.   Musculoskeletal: Negative for gait problem (ambulatory).   Skin: Negative for rash.   Neurological: Negative for headaches.        Positive for feeling off balance.   Hematological: Bruises/bleeds easily.   Psychiatric/Behavioral: Negative for confusion.   All other systems reviewed and are negative.    PAST MEDICAL HISTORY:  Past Medical History:   Diagnosis Date     Asthma in adult      Diabetes mellitus (H)      Hypertension        PAST SURGICAL HISTORY:  Past Surgical History:   Procedure Laterality Date     CHOLECYSTECTOMY       COLONOSCOPY N/A 5/11/2016    Procedure: COLONOSCOPY;  Surgeon: Ady Sandoval MD;  Location:  GI     LAPAROSCOPIC TUBAL LIGATION       LUMPECTOMY BREAST Right 6/22/2016    Procedure: LUMPECTOMY BREAST;  Surgeon: Shameka Nino MD;  Location:  OR           CURRENT MEDICATIONS:    Acetaminophen (ARTHRITIS PAIN RELIEF PO)  albuterol (PROAIR HFA, PROVENTIL HFA, VENTOLIN HFA) 108 (90 BASE) MCG/ACT inhaler  atorvastatin (LIPITOR) 40 MG tablet  blood glucose monitoring (NO BRAND SPECIFIED) meter device kit  clopidogrel (PLAVIX) 75 MG tablet  COMFORT LANCETS MISC  furosemide (LASIX) 20 MG tablet  glipiZIDE (GLUCOTROL XL) 5 MG 24 hr tablet  lisinopril (PRINIVIL/ZESTRIL) 2.5 MG tablet  loratadine (CLARITIN) 10 MG tablet  metFORMIN (GLUCOPHAGE-XR) 500 MG 24 hr tablet  metoprolol succinate (TOPROL-XL) 25 MG 24 hr tablet  naproxen (NAPROSYN) 500 " MG tablet  polyethylene glycol (MIRALAX) powder  traMADol (ULTRAM) 50 MG tablet        ALLERGIES:  Allergies   Allergen Reactions     Seasonal Allergies      Congesion, pain in sinues, lots of sputum     Oxycodone-Acetaminophen        FAMILY HISTORY:  Family History   Problem Relation Age of Onset     Esophageal Cancer Mother      Lung Cancer Father      Liver Cancer Father      Lymphoma Daughter      Bone Cancer Maternal Aunt      Leukemia Maternal Aunt        SOCIAL HISTORY:   Social History     Socioeconomic History     Marital status:    Tobacco Use     Smoking status: Never Smoker     Smokeless tobacco: Never Used   Substance and Sexual Activity     Alcohol use: No     Alcohol/week: 0.0 standard drinks     Drug use: No       VITALS:  /65   Pulse 61   Temp 97.6  F (36.4  C) (Oral)   Resp 20   Wt 77.1 kg (170 lb)   SpO2 96%   BMI 30.11 kg/m      PHYSICAL EXAM      Vitals: /65   Pulse 61   Temp 97.6  F (36.4  C) (Oral)   Resp 20   Wt 77.1 kg (170 lb)   SpO2 96%   BMI 30.11 kg/m    General: Appears in no acute distress, awake, alert, interactive, well appearing.  Eyes: Conjunctivae non-injected. Sclera anicteric.  HENT: Atraumatic.  Neck: Supple.  Respiratory/Chest: Respiration unlabored.  Cardiovascular: Regular rate and rhythm.  Abdomen: non distended. Low abdominal tenderness, no guarding.  Musculoskeletal: Normal extremities. No edema or erythema. Thoracic spine tenderness.  Skin: Normal color. No rash or diaphoresis.  Neurologic: Face symmetric, moves all extremities spontaneously. Speech clear.  Psychiatric: Oriented to person, place, and time. Affect appropriate.       LAB:  All pertinent labs reviewed and interpreted.  Results for orders placed or performed during the hospital encounter of 09/08/22   CTA Chest Abdomen Pelvis w Contrast   Result Value Ref Range    Radiologist flags (AA)      Abnormal proximal SMA which could represent an ulcerative atheromatous plaque, small  area of thrombus, or less likely localized dissection.    Impression    IMPRESSION:  1.  There is an abnormal low density filling defects seen in the proximal aspect of the SMA just distal to the origin and to the left of midline measuring approximately 1.0 cm x 0.5 cm x 0.3 cm and findings could represent a partially ulcerated   atheromatous plaque, a localized area of thrombus, or less likely a localized dissection. The SMA is otherwise normal in appearance to include the distal branches.    2.  No evidence for bowel ischemia.    3.  Approximately 75% stenosis origin of the left renal artery.    4.  2.9 x 2.6 cm cyst right ovary, please refer to below reference for possible follow-up.    REFERENCE:  Management of Incidental Adnexal Findings on CT and MRI: A White Paper of the ACR Incidental Findings Committee. J Am Halie Radiol 2020; 17(2):248-254.    Postmenopausal or equal to or >50 years if status unknown:    Equal to or <3 cm: No further imaging.  >3 cm on CT: Ultrasound.  Equal to or <5 cm on MRI: No follow-up if fully characterized.  >3 cm on MRI: Ultrasound in 6-12 months if not fully characterized.  >5 cm on MRI: Ultrasound in 6-12 months even if fully characterized.      [Critical Result: Abnormal proximal SMA which could represent an ulcerative atheromatous plaque, small area of thrombus, or less likely localized dissection.]    Finding was identified on 9/8/2022 7:15PM.     Dr. Eduardo was contacted by me on 9/8/2022 7:25 PM and verbalized understanding of the critical result.        Head CT w/o contrast    Impression    IMPRESSION:  1.  No acute intracranial abnormalities identified.   CT Thoracic Spine w/o Contrast    Impression    IMPRESSION:  1.  No fracture or posttraumatic subluxation.  2.  No high-grade spinal canal or neural foraminal stenosis.     Cervical spine CT w/o contrast    Impression    IMPRESSION:  1.  No fracture or posttraumatic subluxation.  2.  No high-grade spinal canal or neural  foraminal stenosis.   Comprehensive metabolic panel   Result Value Ref Range    Sodium 137 136 - 145 mmol/L    Potassium 4.2 3.5 - 5.0 mmol/L    Chloride 103 98 - 107 mmol/L    Carbon Dioxide (CO2) 24 22 - 31 mmol/L    Anion Gap 10 5 - 18 mmol/L    Urea Nitrogen 40 (H) 8 - 28 mg/dL    Creatinine 2.00 (H) 0.60 - 1.10 mg/dL    Calcium 9.0 8.5 - 10.5 mg/dL    Glucose 258 (H) 70 - 125 mg/dL    Alkaline Phosphatase 60 45 - 120 U/L    AST 13 0 - 40 U/L    ALT 14 0 - 45 U/L    Protein Total 7.2 6.0 - 8.0 g/dL    Albumin 3.8 3.5 - 5.0 g/dL    Bilirubin Total 0.7 0.0 - 1.0 mg/dL    GFR Estimate 26 (L) >60 mL/min/1.73m2   Result Value Ref Range    Lipase <9 0 - 52 U/L   Lactic acid whole blood   Result Value Ref Range    Lactic Acid 1.3 0.7 - 2.0 mmol/L   Result Value Ref Range    Troponin I 0.01 0.00 - 0.29 ng/mL   CBC with platelets and differential   Result Value Ref Range    WBC Count 6.7 4.0 - 11.0 10e3/uL    RBC Count 3.96 3.80 - 5.20 10e6/uL    Hemoglobin 11.9 11.7 - 15.7 g/dL    Hematocrit 37.1 35.0 - 47.0 %    MCV 94 78 - 100 fL    MCH 30.1 26.5 - 33.0 pg    MCHC 32.1 31.5 - 36.5 g/dL    RDW 12.0 10.0 - 15.0 %    Platelet Count 200 150 - 450 10e3/uL    % Neutrophils 67 %    % Lymphocytes 23 %    % Monocytes 7 %    % Eosinophils 2 %    % Basophils 1 %    % Immature Granulocytes 0 %    NRBCs per 100 WBC 0 <1 /100    Absolute Neutrophils 4.6 1.6 - 8.3 10e3/uL    Absolute Lymphocytes 1.5 0.8 - 5.3 10e3/uL    Absolute Monocytes 0.4 0.0 - 1.3 10e3/uL    Absolute Eosinophils 0.1 0.0 - 0.7 10e3/uL    Absolute Basophils 0.1 0.0 - 0.2 10e3/uL    Absolute Immature Granulocytes 0.0 <=0.4 10e3/uL    Absolute NRBCs 0.0 10e3/uL   Result Value Ref Range    Magnesium 1.9 1.8 - 2.6 mg/dL   Troponin I (now)   Result Value Ref Range    Troponin I 0.02 0.00 - 0.29 ng/mL       RADIOLOGY:  Reviewed all pertinent imaging. Please see official radiology report.  CTA Chest Abdomen Pelvis w Contrast   Final Result   Abnormal   IMPRESSION:   1.   There is an abnormal low density filling defects seen in the proximal aspect of the SMA just distal to the origin and to the left of midline measuring approximately 1.0 cm x 0.5 cm x 0.3 cm and findings could represent a partially ulcerated    atheromatous plaque, a localized area of thrombus, or less likely a localized dissection. The SMA is otherwise normal in appearance to include the distal branches.      2.  No evidence for bowel ischemia.      3.  Approximately 75% stenosis origin of the left renal artery.      4.  2.9 x 2.6 cm cyst right ovary, please refer to below reference for possible follow-up.      REFERENCE:   Management of Incidental Adnexal Findings on CT and MRI: A White Paper of the ACR Incidental Findings Committee. J Am Halie Radiol 2020; 17(2):248-254.      Postmenopausal or equal to or >50 years if status unknown:      Equal to or <3 cm: No further imaging.   >3 cm on CT: Ultrasound.   Equal to or <5 cm on MRI: No follow-up if fully characterized.   >3 cm on MRI: Ultrasound in 6-12 months if not fully characterized.   >5 cm on MRI: Ultrasound in 6-12 months even if fully characterized.         [Critical Result: Abnormal proximal SMA which could represent an ulcerative atheromatous plaque, small area of thrombus, or less likely localized dissection.]      Finding was identified on 9/8/2022 7:15PM.       Dr. Eduardo was contacted by me on 9/8/2022 7:25 PM and verbalized understanding of the critical result.             CT Thoracic Spine w/o Contrast   Final Result   IMPRESSION:   1.  No fracture or posttraumatic subluxation.   2.  No high-grade spinal canal or neural foraminal stenosis.         Cervical spine CT w/o contrast   Final Result   IMPRESSION:   1.  No fracture or posttraumatic subluxation.   2.  No high-grade spinal canal or neural foraminal stenosis.      Head CT w/o contrast   Final Result   IMPRESSION:   1.  No acute intracranial abnormalities identified.          EKG:    Performed at:  14:58    Impression: Sinus rhythm. Prolonged QT    Rate: 61 bpm  Rhythm: Sinus  Axis: -8  AZ Interval: 194 ms  QRS Interval: 106 ms  QTc Interval: 493 ms  ST Changes: None  Comparison: When compared with EKG of 12-MAR-2013, Criteria for inferior infarct are no longer present. T wave amplitude has increased in anterior leads. T wave inversion now evident in lateral leads    I have independently reviewed and interpreted the EKG(s) documented above.    PROCEDURES:   none      I, Bud Francisco, am serving as a scribe to document services personally performed by Fam Eduardo MD based on my observation and the provider's statements to me. I, Dr. Fam Eduardo, attest that Bud Francisco is acting in a scribe capacity, has observed my performance of the services and has documented them in accordance with my direction.    Fam Eduardo MD  Emergency Medicine  [facility] EMERGENCY DEPARTMENT  [address] [place] [id number]  [phone]     Fam Eduardo MD  09/08/22 4714

## 2022-09-08 NOTE — ED NOTES
ED Provider In Triage Note  [facility]  Encounter Date: Sep 8, 2022    Chief Complaint   Patient presents with     Diarrhea       Brief HPI:   Bettina Guerrero is a 72 year old female presenting to the Emergency Department with a chief complaint of syncope. Patient has had diarrhea since yesterday. Last night, was on the toilet and passed out on the toilet. Denies any falls or trauma. Associated with abdominal pain and nausea, shortness of breath and pain between her shoulder blades.     Brief Physical Exam:  Pulse 61   Temp 97.6  F (36.4  C) (Oral)   Resp 20   Wt 77.1 kg (170 lb)   SpO2 96%   BMI 30.11 kg/m    General: Non-toxic appearing  HEENT: Atraumatic  Resp: No respiratory distress  Abdomen: Non-peritoneal  Neuro: Alert, oriented, answers questions appropriately  Psych: Behavior appropriate      Plan Initiated in Triage:  Orders Placed This Encounter     CT Abdomen Pelvis w Contrast     Comprehensive metabolic panel     Lipase     Lactic acid whole blood     UA with Microscopic reflex to Culture     Troponin I     ondansetron (ZOFRAN ODT) ODT tab 4 mg           PIT Dispo:   Return to lobby while awaiting workup and ED bed availability. Concern for electrolyte abnormality, abnormal rhythm.     NIK PHIPPS MD on 9/8/2022 at 2:50 PM    Patient was evaluated by the Physician in Triage due to a limitation of available rooms in the Emergency Department. A plan of care was discussed based on the information obtained on the initial evaluation and patient was consuled to return back to the Emergency Department lobby after this initial evalutaiton until results were obtained or a room became available in the Emergency Department. Patient was counseled not to leave prior to receiving the results of their workup.     NIK PHIPPS MD  [facility] EMERGENCY DEPARTMENT  [address] [place] [id number]  [phone]       Nik Phipps,  MD  09/08/22 1459

## 2022-09-08 NOTE — ED TRIAGE NOTES
Diarrhea x 2 days; pt passed out on toilet in the middle of the night. She feels faint and dizzy today. She endorses sob over the past few days and has been using her inhaler more than usual.     Triage Assessment     Row Name 09/08/22 8084       Triage Assessment (Adult)    Airway WDL WDL       Respiratory WDL    Respiratory WDL WDL       Skin Circulation/Temperature WDL    Skin Circulation/Temperature WDL WDL       Cardiac WDL    Cardiac WDL WDL       Peripheral/Neurovascular WDL    Peripheral Neurovascular WDL WDL       Cognitive/Neuro/Behavioral WDL    Cognitive/Neuro/Behavioral WDL WDL

## 2022-09-09 LAB
ALBUMIN SERPL-MCNC: 3.2 G/DL (ref 3.5–5)
ALP SERPL-CCNC: 52 U/L (ref 45–120)
ALT SERPL W P-5'-P-CCNC: 10 U/L (ref 0–45)
ANION GAP SERPL CALCULATED.3IONS-SCNC: 6 MMOL/L (ref 5–18)
AST SERPL W P-5'-P-CCNC: 12 U/L (ref 0–40)
BASOPHILS # BLD AUTO: 0 10E3/UL (ref 0–0.2)
BASOPHILS NFR BLD AUTO: 1 %
BILIRUB SERPL-MCNC: 0.5 MG/DL (ref 0–1)
BNP SERPL-MCNC: 322 PG/ML (ref 0–123)
BUN SERPL-MCNC: 31 MG/DL (ref 8–28)
CALCIUM SERPL-MCNC: 8.2 MG/DL (ref 8.5–10.5)
CHLORIDE BLD-SCNC: 108 MMOL/L (ref 98–107)
CHOLEST SERPL-MCNC: 121 MG/DL
CO2 SERPL-SCNC: 24 MMOL/L (ref 22–31)
CREAT SERPL-MCNC: 1.25 MG/DL (ref 0.6–1.1)
EOSINOPHIL # BLD AUTO: 0.2 10E3/UL (ref 0–0.7)
EOSINOPHIL NFR BLD AUTO: 3 %
ERYTHROCYTE [DISTWIDTH] IN BLOOD BY AUTOMATED COUNT: 12.2 % (ref 10–15)
GFR SERPL CREATININE-BSD FRML MDRD: 46 ML/MIN/1.73M2
GLUCOSE BLD-MCNC: 133 MG/DL (ref 70–125)
GLUCOSE BLDC GLUCOMTR-MCNC: 134 MG/DL (ref 70–99)
GLUCOSE BLDC GLUCOMTR-MCNC: 139 MG/DL (ref 70–99)
GLUCOSE BLDC GLUCOMTR-MCNC: 187 MG/DL (ref 70–99)
GLUCOSE BLDC GLUCOMTR-MCNC: 205 MG/DL (ref 70–99)
GLUCOSE BLDC GLUCOMTR-MCNC: 245 MG/DL (ref 70–99)
HBA1C MFR BLD: 10 %
HCT VFR BLD AUTO: 34.4 % (ref 35–47)
HDLC SERPL-MCNC: 32 MG/DL
HGB BLD-MCNC: 11.1 G/DL (ref 11.7–15.7)
IMM GRANULOCYTES # BLD: 0 10E3/UL
IMM GRANULOCYTES NFR BLD: 0 %
LACTATE SERPL-SCNC: 0.6 MMOL/L (ref 0.7–2)
LDLC SERPL CALC-MCNC: 66 MG/DL
LYMPHOCYTES # BLD AUTO: 2 10E3/UL (ref 0.8–5.3)
LYMPHOCYTES NFR BLD AUTO: 41 %
MAGNESIUM SERPL-MCNC: 1.8 MG/DL (ref 1.8–2.6)
MCH RBC QN AUTO: 29.9 PG (ref 26.5–33)
MCHC RBC AUTO-ENTMCNC: 32.3 G/DL (ref 31.5–36.5)
MCV RBC AUTO: 93 FL (ref 78–100)
MONOCYTES # BLD AUTO: 0.4 10E3/UL (ref 0–1.3)
MONOCYTES NFR BLD AUTO: 8 %
NEUTROPHILS # BLD AUTO: 2.3 10E3/UL (ref 1.6–8.3)
NEUTROPHILS NFR BLD AUTO: 47 %
NRBC # BLD AUTO: 0 10E3/UL
NRBC BLD AUTO-RTO: 0 /100
PLATELET # BLD AUTO: 184 10E3/UL (ref 150–450)
POTASSIUM BLD-SCNC: 3.8 MMOL/L (ref 3.5–5)
PROT SERPL-MCNC: 6 G/DL (ref 6–8)
RBC # BLD AUTO: 3.71 10E6/UL (ref 3.8–5.2)
SODIUM SERPL-SCNC: 138 MMOL/L (ref 136–145)
TRIGL SERPL-MCNC: 116 MG/DL
TROPONIN I SERPL-MCNC: 0.01 NG/ML (ref 0–0.29)
TROPONIN I SERPL-MCNC: 0.03 NG/ML (ref 0–0.29)
WBC # BLD AUTO: 4.8 10E3/UL (ref 4–11)

## 2022-09-09 PROCEDURE — 99233 SBSQ HOSP IP/OBS HIGH 50: CPT | Performed by: INTERNAL MEDICINE

## 2022-09-09 PROCEDURE — 83735 ASSAY OF MAGNESIUM: CPT | Performed by: INTERNAL MEDICINE

## 2022-09-09 PROCEDURE — 84484 ASSAY OF TROPONIN QUANT: CPT | Performed by: INTERNAL MEDICINE

## 2022-09-09 PROCEDURE — 85025 COMPLETE CBC W/AUTO DIFF WBC: CPT | Performed by: INTERNAL MEDICINE

## 2022-09-09 PROCEDURE — 83605 ASSAY OF LACTIC ACID: CPT | Performed by: INTERNAL MEDICINE

## 2022-09-09 PROCEDURE — 96375 TX/PRO/DX INJ NEW DRUG ADDON: CPT

## 2022-09-09 PROCEDURE — 250N000013 HC RX MED GY IP 250 OP 250 PS 637: Performed by: INTERNAL MEDICINE

## 2022-09-09 PROCEDURE — 80053 COMPREHEN METABOLIC PANEL: CPT | Performed by: INTERNAL MEDICINE

## 2022-09-09 PROCEDURE — 210N000001 HC R&B IMCU HEART CARE

## 2022-09-09 PROCEDURE — 80061 LIPID PANEL: CPT | Performed by: INTERNAL MEDICINE

## 2022-09-09 PROCEDURE — 250N000011 HC RX IP 250 OP 636: Performed by: INTERNAL MEDICINE

## 2022-09-09 PROCEDURE — 36415 COLL VENOUS BLD VENIPUNCTURE: CPT | Performed by: INTERNAL MEDICINE

## 2022-09-09 PROCEDURE — 83880 ASSAY OF NATRIURETIC PEPTIDE: CPT | Performed by: INTERNAL MEDICINE

## 2022-09-09 PROCEDURE — 250N000012 HC RX MED GY IP 250 OP 636 PS 637: Performed by: INTERNAL MEDICINE

## 2022-09-09 PROCEDURE — 83036 HEMOGLOBIN GLYCOSYLATED A1C: CPT | Performed by: INTERNAL MEDICINE

## 2022-09-09 RX ORDER — LABETALOL HYDROCHLORIDE 5 MG/ML
10 INJECTION, SOLUTION INTRAVENOUS EVERY 6 HOURS PRN
Status: DISCONTINUED | OUTPATIENT
Start: 2022-09-09 | End: 2022-09-17 | Stop reason: HOSPADM

## 2022-09-09 RX ORDER — CLOPIDOGREL BISULFATE 75 MG/1
75 TABLET ORAL DAILY
Status: DISCONTINUED | OUTPATIENT
Start: 2022-09-09 | End: 2022-09-14

## 2022-09-09 RX ADMIN — METOPROLOL SUCCINATE 200 MG: 100 TABLET, EXTENDED RELEASE ORAL at 08:53

## 2022-09-09 RX ADMIN — LISINOPRIL 40 MG: 20 TABLET ORAL at 08:53

## 2022-09-09 RX ADMIN — ROSUVASTATIN CALCIUM 40 MG: 40 TABLET, FILM COATED ORAL at 08:56

## 2022-09-09 RX ADMIN — CLOPIDOGREL BISULFATE 75 MG: 75 TABLET ORAL at 12:14

## 2022-09-09 RX ADMIN — ACETAMINOPHEN 1000 MG: 500 TABLET ORAL at 23:19

## 2022-09-09 RX ADMIN — Medication 81 MG: at 08:52

## 2022-09-09 RX ADMIN — ACETAMINOPHEN 1000 MG: 500 TABLET ORAL at 08:52

## 2022-09-09 RX ADMIN — INSULIN ASPART 1 UNITS: 100 INJECTION, SOLUTION INTRAVENOUS; SUBCUTANEOUS at 18:13

## 2022-09-09 RX ADMIN — CLOPIDOGREL BISULFATE 75 MG: 75 TABLET ORAL at 03:48

## 2022-09-09 RX ADMIN — POLYETHYLENE GLYCOL 3350 17 G: 17 POWDER, FOR SOLUTION ORAL at 08:55

## 2022-09-09 RX ADMIN — ACETAMINOPHEN 1000 MG: 500 TABLET ORAL at 00:26

## 2022-09-09 RX ADMIN — LABETALOL HYDROCHLORIDE 10 MG: 5 INJECTION, SOLUTION INTRAVENOUS at 14:39

## 2022-09-09 RX ADMIN — PANTOPRAZOLE SODIUM 20 MG: 20 TABLET, DELAYED RELEASE ORAL at 08:53

## 2022-09-09 RX ADMIN — ISOSORBIDE MONONITRATE 60 MG: 60 TABLET, EXTENDED RELEASE ORAL at 08:56

## 2022-09-09 RX ADMIN — INSULIN ASPART 1 UNITS: 100 INJECTION, SOLUTION INTRAVENOUS; SUBCUTANEOUS at 12:15

## 2022-09-09 ASSESSMENT — ACTIVITIES OF DAILY LIVING (ADL)
ADLS_ACUITY_SCORE: 38
ADLS_ACUITY_SCORE: 35
ADLS_ACUITY_SCORE: 38

## 2022-09-09 NOTE — PROGRESS NOTES
BRIEF VASCULAR SURGERY PROGRESS NOTE    Vascular stopped by to see this morning but patient wanted to rest. Will try to see again later today. From vascular standpoint would recommend blood pressure control, goal SBP <120 for acute SMA dissection. Would like to re-scan with repeat CT in 72 hours if kidney function improved. Full consult to follow.    Jackie Centeno PA-C  VASCULAR SURGERY

## 2022-09-09 NOTE — PLAN OF CARE
Problem: Pain Acute  Goal: Acceptable Pain Control and Functional Ability  Outcome: Ongoing, Progressing  Intervention: Develop Pain Management Plan  Recent Flowsheet Documentation  Taken 9/9/2022 0026 by Lola Mitchell RN  Pain Management Interventions: medication (see MAR)   Goal Outcome Evaluation:  Bettina asked for something for her upper/left sided abdominal pain of 6. She has tylenol 1000mg prn at 0020.  She said it helped a lot but didn't take the pain away entirely;pain down from a 6 to a 4.

## 2022-09-09 NOTE — PHARMACY-ADMISSION MEDICATION HISTORY
Pharmacy Note - Admission Medication History    Pertinent Provider Information: none     ______________________________________________________________________    Prior To Admission (PTA) med list completed and updated in EMR.       PTA Med List   Medication Sig Note Last Dose     Acetaminophen (ARTHRITIS PAIN RELIEF PO) Take 650-1,300 mg by mouth every 8 hours as needed Takes 2 tablets daily in am.Christine Escalante LPN 3:00 PM on 6/8/2018  Past Week     albuterol (PROAIR HFA, PROVENTIL HFA, VENTOLIN HFA) 108 (90 BASE) MCG/ACT inhaler Inhale 2 puffs into the lungs every 6 hours as needed for shortness of breath / dyspnea or wheezing (Patient taking differently: Inhale 2 puffs into the lungs every 4 hours as needed for shortness of breath / dyspnea or wheezing)  9/8/2022 at am     alendronate (FOSAMAX) 70 MG tablet Take 70 mg by mouth every 7 days Qweek on sunday 9/4/2022 at am     aspirin 81 MG EC tablet Take 81 mg by mouth daily 9/8/2022: 9/8/22 run out yesterday- needs to get new bottle 9/7/2022 at am     benzocaine-menthol (CEPACOL) 15-3.6 MG lozenge Place 1 lozenge inside cheek every 2 hours as needed for moderate pain  not recently     glipiZIDE (GLUCOTROL) 10 MG tablet Take 10 mg by mouth 2 times daily (before meals)  9/8/2022 at am     ipratropium - albuterol 0.5 mg/2.5 mg/3 mL (DUONEB) 0.5-2.5 (3) MG/3ML neb solution Take 1 vial by nebulization every 6 hours as needed for shortness of breath / dyspnea or wheezing  9/8/2022 at am     isosorbide mononitrate (IMDUR) 60 MG 24 hr tablet Take 60 mg by mouth daily  9/8/2022 at am     lisinopril (ZESTRIL) 40 MG tablet Take 40 mg by mouth daily  9/8/2022 at am     loratadine (CLARITIN) 10 MG tablet Take 10 mg by mouth daily as needed for allergies  not recently     metFORMIN (GLUCOPHAGE-XR) 500 MG 24 hr tablet Take 500 mg by mouth daily (with breakfast)  9/8/2022 at am     metoprolol succinate ER (TOPROL XL) 200 MG 24 hr tablet Take 200 mg by mouth daily  9/8/2022 at  am     nitroGLYcerin (NITROSTAT) 0.4 MG sublingual tablet Place 0.4 mg under the tongue every 5 minutes as needed for chest pain For chest pain place 1 tablet under the tongue every 5 minutes for 3 doses. If symptoms persist 5 minutes after 1st dose call 911.  9/6/2022     omeprazole (PRILOSEC) 20 MG DR capsule Take 20 mg by mouth daily  9/8/2022 at am     rosuvastatin (CRESTOR) 40 MG tablet Take 40 mg by mouth daily  9/8/2022 at am     vitamin D3 (CHOLECALCIFEROL) 50 mcg (2000 units) tablet Take 1 tablet by mouth daily  9/8/2022 at am       Information source(s): Patient, Clinic records and [facility]  Method of interview communication: in-person    Summary of Changes to PTA Med List  New: n/a  Discontinued: n/a  Changed: n/a    Patient was asked about OTC/herbal products specifically.  PTA med list reflects this.    In the past week, patient estimated taking medication this percent of the time:  greater than 90%.    Allergies were reviewed, assessed, and updated with the patient.      Medications available for use during hospital stay: has her albuterol inhaler and ntg sl.     The information provided in this note is only as accurate as the sources available at the time of the update(s).    Thank you for the opportunity to participate in the care of this patient.    Edgard Castro [facility]  9/8/2022 10:16 PM

## 2022-09-09 NOTE — H&P
"[facility]    History and Physical - Hospitalist Service       Date of Admission:  9/8/2022    Assessment & Plan      Bettina Guerrero is a 72 year old female with PMH of NIDDM, HTN, COPD, HLD, multivessel CAD and ischemic cardiomyopathy with EF of 40%, CKD 3, admitted on 9/8/2022.    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  No evidence of bowel ischemia.  LFTs, lactate, troponin X3 normal.  CTA chest/abdomen negative for bowel ischemia, showed 75% stenosis of proximal left renal artery, showed short segment of SMA stenosis, partially ulcerated atheromatous plaque proximal SMA.  Patient denies intermittent postprandial abdominal pain/abdominal angina.  Vascular surgery was contacted by ED provider, believde the irregularity in the SMA may be related to subacute arterial dissection and recommend blood pressure control to goals of SBP < 130 mmHg, HR < 70, adding aspirin to patient's PTA regimen of Plavix. (I do not see Plavix listed as patient outpatient indication)  -Admit to cardiac telemetry  -S/p baby aspirin given in ED, continue, add 75 mg of Plavix  -BP control as recommended by vascular surgery  -Continue PTA Crestor 40 mg, am lipids    Essential hypertension.  SBP goal is less than 70, as above.  -Continue PTA lisinopril, Toprol-XL    Multivessel CAD ischemic cardiomyopathy, EF 40%.  Chest pain admission.  Troponin negative X3.  NSR on EKG.  -Telemetry  -Continue PT Imdur, aspirin, lisinopril, Toprol-XL.    Non-insulin-dependent diabetes mellitus.  PTA on metformin and glipizide.  A1c 12.4 in June 2018.  Normal recent illness.  GFR 26, thus contraindication for metformin.  Continue glipizide.  SSI NovoLog.  Reportedly she supposed to be on 10 units of Lantus, but states this was not \"[facility]\".   and bedtime, will hold off on starting Lantus till tomorrow.    GERD-on Prilosec.    COPD, not in " "exacerbation.  Continue home inhalers.       Diet: Clear Liquid Diet    DVT Prophylaxis: Pneumatic Compression Devices  Muniz Catheter: Not present  Central Lines: None  Cardiac Monitoring: ACTIVE order. Indication: Chest pain/ ACS rule out (24 hours)  Code Status: Full Code      Clinically Significant Risk Factors Present on Admission   { TIP  This section helps capture the illness of the patient on admission.     - Review diagnoses highlighted in blue; right click, edit & delete if not appropriate   - Optional smartlists should be completed unless they are not applicable   - If blank, no additional diagnoses identified            # Acute Kidney Injury, unspecified: based on a >150% or 0.3 mg/dL increase in creatinine on admission compared to past 90 day average, will monitor renal function    # Hypertension: home medication list includes antihypertensive(s)    # Obesity: Estimated body mass index is 30.11 kg/m  as calculated from the following:    Height as of this encounter: 1.6 m (5' 3\").    Weight as of this encounter: 77.1 kg (170 lb).        Disposition Plan   { TIP  Expected discharge date has passed or is blank! Click here to update expected discharge date and refresh note (tipsheet)      The patient's care was discussed with the Bedside Nurse and Patient.    Ramya Resendiz MD  Hospitalist Service  [facility]  Securely message with the [facility] Web Console (learn more here)  Text page via [employer] Paging/Directory   _____________________________________________________________________    Chief Complaint   Syncope, diarrhea, abdominal pain, shortness of breath, pain between shoulder blades.    History is obtained from the patient, electronic health record and emergency department physician    History of Present Illness   Bettina Guerrero is a 72 year old female with PMH of NIDDM, HTN, COPD, HLD, multivessel CAD and ischemic cardiomyopathy with EF of 40%, CKD 3, presented to ED with " above-mentioned complaints.  She has been having diarrhea for 2 days.  Prior to that severely constipated.  States normally having bowel movement once a months.  Last night passed out while sitting on the toilet.  Patient did not sustain any falls or trauma.  CTA chest/abdomen negative for bowel ischemia, showed 75% stenosis of proximal left renal artery, showed short segment of SMA stenosis, partially ulcerated atheromatous plaque proximal SMA.  Patient denies intermittent postprandial abdominal pain/abdominal angina.  Normal lactate at 1.3.  Afebrile.  Troponin negative X3.  Vascular surgery was contacted by ED provider, recommended adding aspirin to patient's PTA regimen of Plavix.  Patient denies chest pain, cardiac palpitations, headache, focal weakness, dysuria.  Abdominal pain subsided and she states feeling hungry.  No unintentional weight loss.    Review of Systems    The 10 point Review of Systems is negative other than noted in the HPI or here.     Past Medical History    I have reviewed this patient's medical history and updated it with pertinent information if needed.   Past Medical History:   Diagnosis Date     Asthma in adult      Diabetes mellitus (H)      Hypertension      Past Surgical History   I have reviewed this patient's surgical history and updated it with pertinent information if needed.  Past Surgical History:   Procedure Laterality Date     CHOLECYSTECTOMY       COLONOSCOPY N/A 5/11/2016    Procedure: COLONOSCOPY;  Surgeon: Ady Sandoval MD;  Location:  GI     LAPAROSCOPIC TUBAL LIGATION       LUMPECTOMY BREAST Right 6/22/2016    Procedure: LUMPECTOMY BREAST;  Surgeon: Shameka Nino MD;  Location:  OR     Social History   I have reviewed this patient's social history and updated it with pertinent information if needed.  Social History     Tobacco Use     Smoking status: Never Smoker     Smokeless tobacco: Never Used   Substance Use Topics     Alcohol use: No     Alcohol/week:  0.0 standard drinks     Drug use: No     Family History   I have reviewed this patient's family history and updated it with pertinent information if needed.  Family History   Problem Relation Age of Onset     Esophageal Cancer Mother      Lung Cancer Father      Liver Cancer Father      Lymphoma Daughter      Bone Cancer Maternal Aunt      Leukemia Maternal Aunt      Prior to Admission Medications   Prior to Admission Medications   Prescriptions Last Dose Informant Patient Reported? Taking?   Acetaminophen (ARTHRITIS PAIN RELIEF PO) Past Week  Yes Yes   Sig: Take 650-1,300 mg by mouth every 8 hours as needed Takes 2 tablets daily in am.Christine Escalante LPN 3:00 PM on 2018   COMFORT LANCETS MISC   No No   Si each 3 times daily   albuterol (PROAIR HFA, PROVENTIL HFA, VENTOLIN HFA) 108 (90 BASE) MCG/ACT inhaler 2022 at am  No Yes   Sig: Inhale 2 puffs into the lungs every 6 hours as needed for shortness of breath / dyspnea or wheezing   Patient taking differently: Inhale 2 puffs into the lungs every 4 hours as needed for shortness of breath / dyspnea or wheezing   alendronate (FOSAMAX) 70 MG tablet 2022 at am  Yes Yes   Sig: Take 70 mg by mouth every 7 days Qweek on    aspirin 81 MG EC tablet 2022 at am  Yes Yes   Sig: Take 81 mg by mouth daily   benzocaine-menthol (CEPACOL) 15-3.6 MG lozenge not recently  Yes Yes   Sig: Place 1 lozenge inside cheek every 2 hours as needed for moderate pain   blood glucose monitoring (NO BRAND SPECIFIED) meter device kit   No No   Sig: Use to test blood sugar 3 times daily or as directed.   glipiZIDE (GLUCOTROL) 10 MG tablet 2022 at am  Yes Yes   Sig: Take 10 mg by mouth 2 times daily (before meals)   ipratropium - albuterol 0.5 mg/2.5 mg/3 mL (DUONEB) 0.5-2.5 (3) MG/3ML neb solution 2022 at am  Yes Yes   Sig: Take 1 vial by nebulization every 6 hours as needed for shortness of breath / dyspnea or wheezing   isosorbide mononitrate (IMDUR) 60 MG 24 hr  tablet 9/8/2022 at am  Yes Yes   Sig: Take 60 mg by mouth daily   lisinopril (ZESTRIL) 40 MG tablet 9/8/2022 at am  Yes Yes   Sig: Take 40 mg by mouth daily   loratadine (CLARITIN) 10 MG tablet not recently  Yes Yes   Sig: Take 10 mg by mouth daily as needed for allergies   metFORMIN (GLUCOPHAGE-XR) 500 MG 24 hr tablet 9/8/2022 at am  Yes Yes   Sig: Take 500 mg by mouth daily (with breakfast)   metoprolol succinate ER (TOPROL XL) 200 MG 24 hr tablet 9/8/2022 at am  Yes Yes   Sig: Take 200 mg by mouth daily   nitroGLYcerin (NITROSTAT) 0.4 MG sublingual tablet 9/6/2022  Yes Yes   Sig: Place 0.4 mg under the tongue every 5 minutes as needed for chest pain For chest pain place 1 tablet under the tongue every 5 minutes for 3 doses. If symptoms persist 5 minutes after 1st dose call 911.   omeprazole (PRILOSEC) 20 MG DR capsule 9/8/2022 at am  Yes Yes   Sig: Take 20 mg by mouth daily   rosuvastatin (CRESTOR) 40 MG tablet 9/8/2022 at am  Yes Yes   Sig: Take 40 mg by mouth daily   vitamin D3 (CHOLECALCIFEROL) 50 mcg (2000 units) tablet 9/8/2022 at am  Yes Yes   Sig: Take 1 tablet by mouth daily      Facility-Administered Medications: None     Allergies   Allergies   Allergen Reactions     Seasonal Allergies      Congesion, pain in sinues, lots of sputum     Oxycodone-Acetaminophen Itching     Tramadol Diarrhea       Physical Exam   Vital Signs: Temp: 97.6  F (36.4  C) Temp src: Oral BP: (!) 158/70 Pulse: 71   Resp: 18 SpO2: 97 % O2 Device: None (Room air)    Weight: 170 lbs 0 oz    General: Alert and oriented x 3. Not in obvious distress.  HEENT: NC, AT. Neck- supple, No JVP elevation, lymphadenopathy or thyromegaly. Trachea-central.  Chest: Clear to auscultation bilaterally.  Heart: S1S2 regular. No M/R/G.  Abdomen: Soft. NT, ND. No organomegaly. Bowel sounds- active.  Back: No spine tenderness. No CVA tenderness.  Extremities: No leg swelling. Peripheral pulses 2+ bilaterally.  Neuro: Cranial nerves 1-12 grossly normal. No  focal neurological deficit    Data   Data reviewed today: I reviewed all medications, new labs and imaging results over the last 24 hours. I personally reviewed    Recent Labs   Lab 09/09/22  0022 09/08/22  1609   WBC  --  6.7   HGB  --  11.9   MCV  --  94   PLT  --  200   NA  --  137   POTASSIUM  --  4.2   CHLORIDE  --  103   CO2  --  24   BUN  --  40*   CR  --  2.00*   ANIONGAP  --  10   DEB  --  9.0   * 258*   ALBUMIN  --  3.8   PROTTOTAL  --  7.2   BILITOTAL  --  0.7   ALKPHOS  --  60   ALT  --  14   AST  --  13   LIPASE  --  <9     6.7    \    11.9    /    200   N 67    L N/A    137    103    40 (H) /   ------------------------------------ 139 (H)   ALT 14   AST 13   AP 60   ALB 3.8   Ca 9.0  4.2    24    2.00 (H) \    % RETIC N/A    LDH N/A  Troponin N/A    BNP N/A    CK N/A  INR N/A   PTT N/A    D-dimer N/A    Fibrinogen N/A    Antithrombin N/A  Ferritin N/A  CRP N/A    IL-6 N/A  Recent Results (from the past 24 hour(s))   Head CT w/o contrast    Narrative    EXAM: CT HEAD W/O CONTRAST  LOCATION: [facility]  DATE/TIME: 9/8/2022 6:49 PM    INDICATION: fall, dizzy  COMPARISON: MRI brain dated 10/11/2018.  TECHNIQUE: Routine CT Head without IV contrast. Multiplanar reformats. Dose reduction techniques were used.    FINDINGS:  INTRACRANIAL CONTENTS: No intracranial hemorrhage, extraaxial collection, or mass effect.  No CT evidence of acute infarct. Small chronic left parietal infarct. Chronic right lateral basal ganglia lacunar infarct. Mild presumed chronic small vessel   ischemic changes. Mild generalized volume loss. No hydrocephalus.     VISUALIZED ORBITS/SINUSES/MASTOIDS: No intraorbital abnormality. No paranasal sinus mucosal disease. No middle ear or mastoid effusion.    BONES/SOFT TISSUES: No acute abnormality.      Impression    IMPRESSION:  1.  No acute intracranial abnormalities identified.   Cervical spine CT w/o contrast    Narrative    EXAM: CT CERVICAL SPINE W/O  CONTRAST  LOCATION: [facility]  DATE/TIME: 9/8/2022 6:49 PM    INDICATION: fall, neck pain  COMPARISON: None.  TECHNIQUE: Routine CT Cervical Spine without IV contrast. Multiplanar reformats. Dose reduction techniques were used.    FINDINGS:  VERTEBRA: Mild reversal of the normal cervical lordosis. Otherwise unremarkable vertebral body heights and alignment. No fracture or posttraumatic subluxation.     CANAL/FORAMINA: No canal or neural foraminal stenosis.    PARASPINAL: No extraspinal abnormality.      Impression    IMPRESSION:  1.  No fracture or posttraumatic subluxation.  2.  No high-grade spinal canal or neural foraminal stenosis.   CT Thoracic Spine w/o Contrast    Narrative    EXAM: CT THORACIC SPINE W/O CONTRAST  LOCATION: [facility]  DATE/TIME: 9/8/2022 6:50 PM    INDICATION: fall  COMPARISON: None.  TECHNIQUE: Routine CT Thoracic Spine without IV contrast. Multiplanar reformats. Dose reduction techniques were used.     FINDINGS:  VERTEBRA: Normal vertebral body heights and alignment. No fracture or posttraumatic subluxation.     CANAL/FORAMINA: No high-grade spinal canal or neural foraminal stenosis.    PARASPINAL: No extraspinal abnormality.      Impression    IMPRESSION:  1.  No fracture or posttraumatic subluxation.  2.  No high-grade spinal canal or neural foraminal stenosis.     CTA Chest Abdomen Pelvis w Contrast   Result Value    Radiologist flags (AA)     Abnormal proximal SMA which could represent an ulcerative atheromatous plaque, small area of thrombus, or less likely localized dissection.    Narrative    EXAM: CTA CHEST ABDOMEN PELVIS W CONTRAST  LOCATION: [facility]  DATE/TIME: 9/8/2022 6:50 PM    INDICATION: Shortness of breath, abdominal pain, shoulder blade pain.  COMPARISON: None.  TECHNIQUE: CT angiogram chest abdomen pelvis during arterial phase of injection of IV contrast. 2D and 3D MIP reconstructions were  performed by the CT technologist. Dose reduction techniques were used.   CONTRAST: 75 mL Isovue 370    FINDINGS:   CT ANGIOGRAM CHEST, ABDOMEN, AND PELVIS: There is an abnormal low density filling defect seen in the proximal aspect of the SMA just distal to the origin and to the left of midline measuring approximately 1.0 cm x 0.5 cm x 0.3 cm and findings could   represent a partially ulcerated atheromatous plaque, a localized area of thrombus, or less likely a localized dissection. The SMA is otherwise normal in appearance to include the distal branches. There is mild scattered vascular calcification seen most   marked in the iliac arteries and the abdominal aorta. There is approximately 75% stenosis seen involving the origin of the left renal artery; however, no significant parenchymal atrophy is seen. There is good flow extending into both groins. The thoracic   aorta, abdominal aorta, great vessels arising from the abdominal aorta, and the iliac arteries are otherwise normal.    LUNGS AND PLEURA: Mild mosaic perfusion which is nonspecific, but most typical for small airway inflammation with underlying air trapping. Tiny, benign lymph node associated with the minor fissure. Minimal dependent atelectasis.    MEDIASTINUM/AXILLAE: Normal.    CORONARY ARTERY CALCIFICATION: There appears to be a stent seen in the LAD with moderate to marked calcification of the coronary arteries.    HEPATOBILIARY: The gallbladder surgically absent. Normal variant reservoir effect on the common bile duct. The liver is normal.    PANCREAS: Normal.    SPLEEN: Normal.    ADRENAL GLANDS: There is an indeterminant 2.6 cm x 2.2 cm lesion in the right adrenal gland. The left adrenal gland is normal.    KIDNEYS/BLADDER: Minute benign cyst in the right kidney and no follow-up is recommended. Presumed mild iatrogenic gas within the lumen of the bladder. The kidneys, ureters, and bladder are otherwise normal.    BOWEL: Mild findings of  diverticulosis with no evidence for diverticulitis. The bowel is otherwise normal.    LYMPH NODES: Normal.    PELVIC ORGANS: Simple 2.9 cm x 2.6 cm cyst right ovary. The pelvic organs are otherwise normal.    MUSCULOSKELETAL: Moderate to advanced multilevel degenerative changes of the spine most pronounced in the lumbar facet joints.      Impression    IMPRESSION:  1.  There is an abnormal low density filling defects seen in the proximal aspect of the SMA just distal to the origin and to the left of midline measuring approximately 1.0 cm x 0.5 cm x 0.3 cm and findings could represent a partially ulcerated   atheromatous plaque, a localized area of thrombus, or less likely a localized dissection. The SMA is otherwise normal in appearance to include the distal branches.    2.  No evidence for bowel ischemia.    3.  Approximately 75% stenosis origin of the left renal artery.    4.  2.9 x 2.6 cm cyst right ovary, please refer to below reference for possible follow-up.    REFERENCE:  Management of Incidental Adnexal Findings on CT and MRI: A White Paper of the ACR Incidental Findings Committee. J Am Halie Radiol 2020; 17(2):248-254.    Postmenopausal or equal to or >50 years if status unknown:    Equal to or <3 cm: No further imaging.  >3 cm on CT: Ultrasound.  Equal to or <5 cm on MRI: No follow-up if fully characterized.  >3 cm on MRI: Ultrasound in 6-12 months if not fully characterized.  >5 cm on MRI: Ultrasound in 6-12 months even if fully characterized.      [Critical Result: Abnormal proximal SMA which could represent an ulcerative atheromatous plaque, small area of thrombus, or less likely localized dissection.]    Finding was identified on 9/8/2022 7:15PM.     Dr. Eduardo was contacted by me on 9/8/2022 7:25 PM and verbalized understanding of the critical result.

## 2022-09-09 NOTE — UTILIZATION REVIEW
Admission Status; Secondary Review Determination       Under the authority of the Utilization Management Committee, the utilization review process indicated a secondary review on the above patient. The review outcome is based on review of the medical records, discussions with staff, and applying clinical experience noted on the date of the review.     (x) Inpatient Status Appropriate - This patient's medical care is consistent with medical management for inpatient care and reasonable inpatient medical practice.     RATIONALE FOR DETERMINATION     Ms. Guerrero is a 71 yo female with PMH of DM, cAD and cM who presents to the ED with abd pain and acute renal failure. CTA with evidence of 75 % of proximal left renal artery and suggestive findings of acute SMA dissection.  Vascular surgery consult requested; recommending close SBP monitoring with goal <120 and repeat CT scan in 72 hours if renal function stabilizes.  She remains on clear liquid diet only and is requiring ongoing intpatient treatment and monitoring.    At the time of admission with the information available to the attending physician more than 2 nights Hospital complex care was anticipated, based on patient risk of adverse outcome if treated as outpatient and complex care required. Inpatient admission is appropriate based on the Medicare guidelines.       The information on this document is developed by the utilization review team in order for the business office to ensure compliance. This only denotes the appropriateness of proper admission status and does not reflect the quality of care rendered.   The definitions of Inpatient Status and Observation Status used in making the determination above are those provided in the CMS Coverage Manual, Chapter 1 and Chapter 6, section 70.4.         Sincerely,     Kathy Cuellar, DO  Utilization Review  Physician Advisor  [facility].

## 2022-09-09 NOTE — PROGRESS NOTES
[facility]    Medicine Progress Note - Hospitalist Service    Date of Admission:  9/8/2022    Assessment & Plan        Bettina Guerrero is a 72 year old female with history DM, HTN, COPD, HLD, multivessel CAD and ischemic cardiomyopathy with EF of 40%, CKD 3, admitted on 9/8/2022.     Abdominal pain  Subacute arterial dissection of the SMA.  CTA showed 75% stenosis of proximal left renal artery, short segment of SMA stenosis, partially ulcerated atheromatous plaque proximal SMA and no evidence of bowel ischemia. As per vascular surgeon, findings are likely secondary to subacute arterial dissection.     Clear liquid diet as tolerated  Received IV fluid overnight   Keep SBP < 120 and HR < 70  Continue Aspirin and plavix  Continue rosuvastatin 40 mg daily    Repeat CT scan in the next 72 hours as per vascular surgery service   Vascular surgery follow up- appreciate assistance     Essential hypertension  Keep SBP < 130 per vascular surgeon   Continue PTA lisinopril and Toprol-XL  Prn labetalol IV        Multivessel CAD ischemic cardiomyopathy, EF 40%.   Chest pain   Troponin negative X3.  NSR on EKG.  Telemetry  Continue PT Imdur, aspirin, lisinopril, Toprol-XL.     Non-insulin-dependent diabetes mellitus.    A1c 12.4 in June 2018; follow up repeat A1c  Hold PTA metformin and glipizide.   Insulin sliding scale   Insulin carb coverage 1:15   Consider adding lantus if glucose is elevated    RENEA/Stage III CKD   Creatinine 2.0 on 9/8/22 compared to 1.14 on 8/12/22  Possibly prerenal given relative hypotension vs cardiorenal process given ischemic cadriomyopathy  He received IV NS 1L bolus in the ER.   Monitor BMP  Avoid nephrotoxins      GERD  Continue pantoprazole      COPD  Not in exacerbation  Continue home inhalers.       Diet: Clear Liquid Diet    DVT Prophylaxis: Pneumatic Compression Devices  Muniz Catheter: Not present  Central Lines: None  Cardiac Monitoring: ACTIVE order. Indication:  "Chest pain/ ACS rule out (24 hours)  Code Status: Full Code      Disposition Plan      Expected Discharge Date: 09/12/2022, 12:00 PM              The patient's care was discussed with the Patient and bedside nurse.    Vishal Mancia MD  Hospitalist Service  [facility]  Securely message with the Vocera Web Console (learn more here)  Text page via [employer] Paging/Directory         Clinically Significant Risk Factors Present on Admission             # Hypoalbuminemia: Albumin = 3.2 g/dL (Ref range: 3.5 - 5.0 g/dL) on admission, will monitor as appropriate     # Hypertension: home medication list includes antihypertensive(s)    # DMII: A1C = 10.0 % (Ref range: <=5.6 %) within past 3 months  # Obesity: Estimated body mass index is 30.11 kg/m  as calculated from the following:    Height as of this encounter: 1.6 m (5' 3\").    Weight as of this encounter: 77.1 kg (170 lb).        ______________________________________________________________________    Interval History   No new complaints. Abdominal pain has subsided. He denies nausea. He states he is hungry and requested to eat. He denies melena or hematochezia. Started using cane for ambulation following MI x2  and CVA x1    Data reviewed today: I reviewed all medications, new labs and imaging results over the last 24 hours. I personally reviewed no images or EKG's today.    Physical Exam   Vital Signs: Temp: 97.9  F (36.6  C) Temp src: Oral BP: 108/54 Pulse: 68   Resp: 20 SpO2: 94 % O2 Device: None (Room air)    Weight: 170 lbs 0 oz    General appearance: Awake, Alert, Cooperative, not in any obvious distress and appears stated age   HEENT: Normocephalic, atraumatic, conjunctiva clear without icterus and ears without discharge  Lungs: Clear to auscultation bilaterally, no wheezing, good air exchange, normal work of breathing  Cardiovascular: Regular Rate and Rythm, normal apical impulse, normal S1 and S2, no lower extremity edema " bilaterally  Abdomen: Soft,vague epigastric tenderness, non-distended, active bowel sounds  Skin: Skin color, texture normal and bruising or bleeding. No rashes or lesions over face, neck, arms and legs, turgor normal.  Musculoskeletal: No bony deformities or joint tenderness. Normal ROM upon flexion & extension.   Neurologic: Alert & Oriented X 3, Facial symmetry preserved and upper extremities. Power 3/5 in both lower extremities.   Psychiatric: Calm, normal eye contact and normal affect      Data   Recent Results (from the past 24 hour(s))   Head CT w/o contrast    Narrative    EXAM: CT HEAD W/O CONTRAST  LOCATION: [facility]  DATE/TIME: 9/8/2022 6:49 PM    INDICATION: fall, dizzy  COMPARISON: MRI brain dated 10/11/2018.  TECHNIQUE: Routine CT Head without IV contrast. Multiplanar reformats. Dose reduction techniques were used.    FINDINGS:  INTRACRANIAL CONTENTS: No intracranial hemorrhage, extraaxial collection, or mass effect.  No CT evidence of acute infarct. Small chronic left parietal infarct. Chronic right lateral basal ganglia lacunar infarct. Mild presumed chronic small vessel   ischemic changes. Mild generalized volume loss. No hydrocephalus.     VISUALIZED ORBITS/SINUSES/MASTOIDS: No intraorbital abnormality. No paranasal sinus mucosal disease. No middle ear or mastoid effusion.    BONES/SOFT TISSUES: No acute abnormality.      Impression    IMPRESSION:  1.  No acute intracranial abnormalities identified.   Cervical spine CT w/o contrast    Narrative    EXAM: CT CERVICAL SPINE W/O CONTRAST  LOCATION: [facility]  DATE/TIME: 9/8/2022 6:49 PM    INDICATION: fall, neck pain  COMPARISON: None.  TECHNIQUE: Routine CT Cervical Spine without IV contrast. Multiplanar reformats. Dose reduction techniques were used.    FINDINGS:  VERTEBRA: Mild reversal of the normal cervical lordosis. Otherwise unremarkable vertebral body heights and alignment. No fracture or  posttraumatic subluxation.     CANAL/FORAMINA: No canal or neural foraminal stenosis.    PARASPINAL: No extraspinal abnormality.      Impression    IMPRESSION:  1.  No fracture or posttraumatic subluxation.  2.  No high-grade spinal canal or neural foraminal stenosis.   CT Thoracic Spine w/o Contrast    Narrative    EXAM: CT THORACIC SPINE W/O CONTRAST  LOCATION: [facility]  DATE/TIME: 9/8/2022 6:50 PM    INDICATION: fall  COMPARISON: None.  TECHNIQUE: Routine CT Thoracic Spine without IV contrast. Multiplanar reformats. Dose reduction techniques were used.     FINDINGS:  VERTEBRA: Normal vertebral body heights and alignment. No fracture or posttraumatic subluxation.     CANAL/FORAMINA: No high-grade spinal canal or neural foraminal stenosis.    PARASPINAL: No extraspinal abnormality.      Impression    IMPRESSION:  1.  No fracture or posttraumatic subluxation.  2.  No high-grade spinal canal or neural foraminal stenosis.     CTA Chest Abdomen Pelvis w Contrast   Result Value    Radiologist flags (AA)     Abnormal proximal SMA which could represent an ulcerative atheromatous plaque, small area of thrombus, or less likely localized dissection.    Narrative    EXAM: CTA CHEST ABDOMEN PELVIS W CONTRAST  LOCATION: [facility]  DATE/TIME: 9/8/2022 6:50 PM    INDICATION: Shortness of breath, abdominal pain, shoulder blade pain.  COMPARISON: None.  TECHNIQUE: CT angiogram chest abdomen pelvis during arterial phase of injection of IV contrast. 2D and 3D MIP reconstructions were performed by the CT technologist. Dose reduction techniques were used.   CONTRAST: 75 mL Isovue 370    FINDINGS:   CT ANGIOGRAM CHEST, ABDOMEN, AND PELVIS: There is an abnormal low density filling defect seen in the proximal aspect of the SMA just distal to the origin and to the left of midline measuring approximately 1.0 cm x 0.5 cm x 0.3 cm and findings could   represent a partially ulcerated  atheromatous plaque, a localized area of thrombus, or less likely a localized dissection. The SMA is otherwise normal in appearance to include the distal branches. There is mild scattered vascular calcification seen most   marked in the iliac arteries and the abdominal aorta. There is approximately 75% stenosis seen involving the origin of the left renal artery; however, no significant parenchymal atrophy is seen. There is good flow extending into both groins. The thoracic   aorta, abdominal aorta, great vessels arising from the abdominal aorta, and the iliac arteries are otherwise normal.    LUNGS AND PLEURA: Mild mosaic perfusion which is nonspecific, but most typical for small airway inflammation with underlying air trapping. Tiny, benign lymph node associated with the minor fissure. Minimal dependent atelectasis.    MEDIASTINUM/AXILLAE: Normal.    CORONARY ARTERY CALCIFICATION: There appears to be a stent seen in the LAD with moderate to marked calcification of the coronary arteries.    HEPATOBILIARY: The gallbladder surgically absent. Normal variant reservoir effect on the common bile duct. The liver is normal.    PANCREAS: Normal.    SPLEEN: Normal.    ADRENAL GLANDS: There is an indeterminant 2.6 cm x 2.2 cm lesion in the right adrenal gland. The left adrenal gland is normal.    KIDNEYS/BLADDER: Minute benign cyst in the right kidney and no follow-up is recommended. Presumed mild iatrogenic gas within the lumen of the bladder. The kidneys, ureters, and bladder are otherwise normal.    BOWEL: Mild findings of diverticulosis with no evidence for diverticulitis. The bowel is otherwise normal.    LYMPH NODES: Normal.    PELVIC ORGANS: Simple 2.9 cm x 2.6 cm cyst right ovary. The pelvic organs are otherwise normal.    MUSCULOSKELETAL: Moderate to advanced multilevel degenerative changes of the spine most pronounced in the lumbar facet joints.      Impression    IMPRESSION:  1.  There is an abnormal low density  filling defects seen in the proximal aspect of the SMA just distal to the origin and to the left of midline measuring approximately 1.0 cm x 0.5 cm x 0.3 cm and findings could represent a partially ulcerated   atheromatous plaque, a localized area of thrombus, or less likely a localized dissection. The SMA is otherwise normal in appearance to include the distal branches.    2.  No evidence for bowel ischemia.    3.  Approximately 75% stenosis origin of the left renal artery.    4.  2.9 x 2.6 cm cyst right ovary, please refer to below reference for possible follow-up.    REFERENCE:  Management of Incidental Adnexal Findings on CT and MRI: A White Paper of the ACR Incidental Findings Committee. J Am Halie Radiol 2020; 17(2):248-254.    Postmenopausal or equal to or >50 years if status unknown:    Equal to or <3 cm: No further imaging.  >3 cm on CT: Ultrasound.  Equal to or <5 cm on MRI: No follow-up if fully characterized.  >3 cm on MRI: Ultrasound in 6-12 months if not fully characterized.  >5 cm on MRI: Ultrasound in 6-12 months even if fully characterized.      [Critical Result: Abnormal proximal SMA which could represent an ulcerative atheromatous plaque, small area of thrombus, or less likely localized dissection.]    Finding was identified on 9/8/2022 7:15PM.     Dr. Eduardo was contacted by me on 9/8/2022 7:25 PM and verbalized understanding of the critical result.

## 2022-09-09 NOTE — CONSULTS
Brief Vascular Surgery Note    Imaging reviewed. Given the history and the imaging findings, I believe the irregularity in the SMA may be related to subacute arterial dissection.   Recommend blood pressure control to goals of SBP < 130 mmHg, HR < 70.   Add aspirin for DAPT (on plavix prior to admission) and statin.   Keep Ms. Guerrero NPO overnight with IVF.   We will evaluate in the morning; please call if there are any changes or concerns overnight.     Pastora Quinn MD  Vascular Surgery  Cell [phone]

## 2022-09-09 NOTE — ED NOTES
Had been taking blood pressure every 15 minutes, did not cross over and EDT accidentally discharged monitor and cannot retrieve. Pt's B/P in 150's/80's, HR 60-70's, O2 sats 94-96% RA

## 2022-09-09 NOTE — DISCHARGE INSTRUCTIONS
DIABETES REMINDERS:  1) Check your blood sugar 1 time per day. Check first thing in am and occasionally two hours after meals.  Goals are in #2  Always bring your blood sugar log and meter to your diabetes-related appointments.  2) Your blood sugar goals:  80-130mg/dL before eating  and  mg/dL 2 hours after eating (or per your doctor).  3) Always be prepared to treat a low blood sugar should it happen. Keep a sugar-containing beverage or food nearby.  4) When to call your clinic:   Blood sugar over 400 mg/dL.   If you have 2 to 3 low blood sugars (under 70mg/dL) in a row,   Low reading the same time of day several days in a row,  Blood sugars elevated and you can not get them down with your usual diabetes regimen,  You are ill and can't keep blood sugars controlled.   5) When to call 911:  If your blood glucose does not get better with treatment, or if you/someone else is unable to give you treatment.  6) Talk to your primary care doctor about an appointment with a Certified Diabetes Educator to assist you with your BG management.  7) Follow insulin regimen on discharge orders until able to see provider where doses may be adjusted based on blood sugar patterns.

## 2022-09-09 NOTE — CONSULTS
DIABETES CARE    Situation:  Consulted by Provider for Diabetes Education.  72year old female with type 2 Diabetes. Patient was admitted for Abdominal pain,likely due to SMA stenosis/partial ulcerated atheromatous plaque/subacute arterial dissection     Background:  Related Co-morbidities include:HTN, COPD, HLD, CAD CKD3  PCP: Angie Newsome [place]  Social:Lives with spouse    Diabetes History:   History of type 2 managed with orals. Last A1C she reports was 8. Today is 10.    Meds for BG Management PTA:  Glipizide 10 mg twice daily  Metformin 500 mg once daily, reports more causes diarrhea      Current Inpatient Meds for BG Management:  Novolog low correction 1 drops 100mg/dl  Novolog meals 1:15    Labs:  Hemoglobin A1C: 10.0%  Hgb: 11.1g/dL  SCr: 1.25 mg/dL   GFR: 46 mL/min/1.73m^2    Blood Glucose POC:    Latest Reference Range & Units 08/22/19 12:06 05/04/20 11:13 06/14/21 13:28 01/19/22 14:01 04/06/22 10:48 09/08/22 16:09 09/09/22 00:22 09/09/22 08:05 09/09/22 08:20   Glucose 70 - 125 mg/dL 154 (H) 300 (H) 238 (H) 336 (H) 337 (H) 258 (H)  133 (H)    GLUCOSE BY METER POCT 70 - 99 mg/dL       139 (H)  134 (H)     Diet Order: Clear liquid diet   Intake: Good   Weight: 77.1 kg    BMI: 30.11 kg/m^2    DM EDUCATION/COUNSELING:  Barriers to Learning and/or DM Self-Management: None known  Previous DM Education: Some   Current education and/or visit with patient and/or caregiver:  Educated/reviewed diabetes basics: pathophysiology, hyperglycemia, long term complications, treatment.  Explained normal/goals of blood sugar control, A1C, when to call provider.  Educated/reviewed use of home glucose meter and patient has been using and just reordered strips etc. .  Educated on normal pancreas function, how oral medications work,.  TCarbohydrates were briefly discussed and their effect on BG. Reinforced healthy eating.     Written handouts given on all education provided.     Assessment:  She had a recent increase  "in blood sugars.  With a small amount of insulin sugars recovered. She usually wakes up with blood glucose of 130. Told her to make some 2 hour after meal readings to see how she is doing then. If always elevated, contact MD or OP diabetes educator.       Recommendations:  1. Would increase oral medication. Add januvia or jardiance to help with blood sugar control and follow up with MD for other options if needed.     Refer to \"Guidelines for Insulin Initiation and Care in Hospitalized Adults\"  link in Diabetes Management Order set for dosing guidelines.  Hospital goals for blood glucose levels are < 180 mg/dL for improved health outcomes.        DISCHARGE NEEDS:  Jardiance or Januvia as possible additional oral medications.    Thank you,   Audelia Wood RN, [facility]/ Pager [phone]    "

## 2022-09-09 NOTE — PROGRESS NOTES
NUTRITION EDUCATION      REASON FOR ASSESSMENT:  Consult to educate pt on diabetic diet    RD will see pt when she is transferred to floor care

## 2022-09-09 NOTE — ED PROVIDER NOTES
EMERGENCY DEPARTMENT ENCOUNTER      NAME: Bettina Guerrero  AGE: 72 year old female  YOB: 1950  MRN: [id number]  EVALUATION DATE & TIME: 9/8/2022  3:22 PM    PCP: System, Provider Not In    ED PROVIDER: Josemanuel Eduardo MD        Chief Complaint   Patient presents with     Diarrhea         FINAL IMPRESSION:  1. Diarrhea, unspecified type    2. Acute renal failure, unspecified acute renal failure type (H)    3. Fall, initial encounter    4. Chest pain, unspecified type    5. Superior mesenteric artery (trunk) injury, initial encounter          ED COURSE & MEDICAL DECISION MAKING:    Pertinent Labs & Imaging studies reviewed. (See chart for details)  72 year old female presents to the Emergency Department for evaluation of diarrhea, fall, left-sided chest pain, lower abdominal pain, feeling off    Work-up was initiated by provider in triage.  CT imaging labs and EKG were ordered    EKG x2 without evidence of ACS.  Troponin x2 without evidence ACS.  Given nitroglycerin.     3:30 PM I met with the patient, obtained history, performed an initial exam, and discussed options and plan for diagnostics and treatment here in the ED. PPE worn including N95 mask, surgical gloves, face shield.  7:38 PM Radiology called with abnormal finding on CTA.  7:45 PM I reevaluated the patient.  7:49 PM I spoke with Dr. Quinn, vascular surgeon.  8:12 PM I spoke with the hospitalist Dr. Dorado who accepts the patient.      ED Course as of 09/08/22 2057   Thu Sep 08, 2022   1648 Patient presents with a chief complaint of back pain, abdominal pain, diarrhea, chest pain, states he fainted on the toilet at 2:30 in the morning.  Developed chest pain this afternoon   1648 Broad differential including dehydration, anemia, ACS, aortic dissection, pulm emboli, pneumonia, diverticulitis, cystitis, stroke, intracranial hemorrhage, but not limited to   1649 Work-up is initiated in triage included CTA chest and pelvis, labs, EKG   1649 On exam  she has some thoracic spine tenderness as well as some lower abdominal tenderness.  She has no wheezing or crackles   1649 EKG shows T wave inversion in aVL   1649 Has active chest pain therefore nitroglycerin ordered.  We will hold off on aspirin until CT head is completed to make sure is no intracranial hemorrhage   1649 Urea Nitrogen(!): 40   1649 Creatinine(!): 2.00  Patient does show a prerenal type renal failure picture as well as elevated glucose on labs   1650 IV fluids ordered   1650 WBC: 6.7   1650 Hemoglobin: 11.9   1650 Platelet Count: 200   1713 Lactic Acid: 1.3   1713 Troponin I: 0.01  ACS unlikely.   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  Spoke with vascular recommends aspirin in additon to the plavix, continue statin, blood pressure less than 160, and n.p.o.   2041 Vascular surgery called back after review the imaging and they do feel it is a small focal dissection.  They recommend n.p.o. tonight, call the vascular team if abdominal pain gets worse or if patient develops worsening symptoms.  Blood pressure less than 160       At the conclusion of the encounter I discussed the results of all of the tests and the disposition. The questions were answered. The patient or family acknowledged understanding and was agreeable with the care plan.         MEDICATIONS GIVEN IN THE EMERGENCY:  Medications   nitroGLYcerin (NITROSTAT) sublingual tablet 0.4 mg (has no administration in time range)   ondansetron (ZOFRAN ODT) ODT tab 4 mg (4 mg Oral Given 9/8/22 1622)   0.9% sodium chloride BOLUS (1,000 mLs Intravenous New Bag 9/8/22 1900)   iopamidol (ISOVUE-370) solution 75 mL (75 mLs Intravenous Given 9/8/22 1826)   aspirin EC tablet 81 mg (81 mg Oral Given 9/8/22 2048)       NEW PRESCRIPTIONS STARTED AT TODAY'S ER VISIT  New Prescriptions    No medications on file          =================================================================    HPI    Triage  "note  \"  Diarrhea x 2 days; pt passed out on toilet in the middle of the night. She feels faint and dizzy today. She endorses sob over the past few days and has been using her inhaler more than usual.     Triage Assessment     Row Name 09/08/22 2957       Triage Assessment (Adult)    Airway WDL WDL       Respiratory WDL    Respiratory WDL WDL       Skin Circulation/Temperature WDL    Skin Circulation/Temperature WDL WDL       Cardiac WDL    Cardiac WDL WDL       Peripheral/Neurovascular WDL    Peripheral Neurovascular WDL WDL       Cognitive/Neuro/Behavioral WDL    Cognitive/Neuro/Behavioral WDL WDL              \"      Patient information was obtained from: Patient    Use of : N/A        Bettina Guerrero is a 72 year old female with a pertinent history of diabetes mellitus type 2, hypertension, asthma, hyperlipidemia, CHF, CAD, STEMI, stroke, s/p cholecystectomy, s/p coronary stent placement (x3) who presents to this ED by walk in with her granddaughter for evaluation of chest pain, diarrhea, multiple complaints. Patient endorses ongoing left sided chest pain and feeling \"like there is fluid in there\" for the past year. However, patient reports left sided anterior chest pain for the past 12 hours which she describes as aching. Her pain is not provoked with tenderness to palpation or     Yesterday, patient reports having \"really bad\" diarrhea. This morning at 2:30 AM, patient went to have a bathroom to have a bowel movement and states she syncopized for an unknown amount of time. However, patient reports she later went to her room and saw it was 3:30 AM. Patient has had a total of 4-5 episodes of diarrhea today. She describes her stools as \"estrellita\" colored with some \"phlegm\" in it. No recent antibiotic use or recent travel. She endorses associated mid suprapubic abdominal pain.    Patient currently endorses bilateral posterior shoulder pain, a subjective fever, nausea, nasal congestion with lots of " "phlegm, feels off balance. Patient was able to ambulate into the ED with a cane which she uses at baseline.    She also reports current shortness of breath and has been needing to use her PRN inhaler more than normal recently.    Denies dysuria or other urinary symptoms, vomiting. No recent falls. No other reported complaints at this time.      REVIEW OF SYSTEMS   Review of Systems   Constitutional: Positive for fever (subjective).   HENT: Positive for congestion.    Respiratory: Positive for shortness of breath.    Cardiovascular: Positive for chest pain (left sided).   Gastrointestinal: Positive for abdominal pain (mid suprapubic pain), diarrhea and nausea. Negative for blood in stool, constipation and vomiting.        Positive for \"phlegm\" in stool.   Genitourinary: Negative.  Negative for dysuria.   Musculoskeletal: Negative for gait problem (ambulatory).   Skin: Negative for rash.   Neurological: Negative for headaches.        Positive for feeling off balance.   Hematological: Bruises/bleeds easily.   Psychiatric/Behavioral: Negative for confusion.   All other systems reviewed and are negative.    PAST MEDICAL HISTORY:  Past Medical History:   Diagnosis Date     Asthma in adult      Diabetes mellitus (H)      Hypertension        PAST SURGICAL HISTORY:  Past Surgical History:   Procedure Laterality Date     CHOLECYSTECTOMY       COLONOSCOPY N/A 5/11/2016    Procedure: COLONOSCOPY;  Surgeon: Ady Sandoval MD;  Location:  GI     LAPAROSCOPIC TUBAL LIGATION       LUMPECTOMY BREAST Right 6/22/2016    Procedure: LUMPECTOMY BREAST;  Surgeon: Shameka Nino MD;  Location:  OR           CURRENT MEDICATIONS:    Acetaminophen (ARTHRITIS PAIN RELIEF PO)  albuterol (PROAIR HFA, PROVENTIL HFA, VENTOLIN HFA) 108 (90 BASE) MCG/ACT inhaler  atorvastatin (LIPITOR) 40 MG tablet  blood glucose monitoring (NO BRAND SPECIFIED) meter device kit  clopidogrel (PLAVIX) 75 MG tablet  COMFORT LANCETS MISC  furosemide (LASIX) " 20 MG tablet  glipiZIDE (GLUCOTROL XL) 5 MG 24 hr tablet  lisinopril (PRINIVIL/ZESTRIL) 2.5 MG tablet  loratadine (CLARITIN) 10 MG tablet  metFORMIN (GLUCOPHAGE-XR) 500 MG 24 hr tablet  metoprolol succinate (TOPROL-XL) 25 MG 24 hr tablet  naproxen (NAPROSYN) 500 MG tablet  polyethylene glycol (MIRALAX) powder  traMADol (ULTRAM) 50 MG tablet        ALLERGIES:  Allergies   Allergen Reactions     Seasonal Allergies      Congesion, pain in sinues, lots of sputum     Oxycodone-Acetaminophen        FAMILY HISTORY:  Family History   Problem Relation Age of Onset     Esophageal Cancer Mother      Lung Cancer Father      Liver Cancer Father      Lymphoma Daughter      Bone Cancer Maternal Aunt      Leukemia Maternal Aunt        SOCIAL HISTORY:   Social History     Socioeconomic History     Marital status:    Tobacco Use     Smoking status: Never Smoker     Smokeless tobacco: Never Used   Substance and Sexual Activity     Alcohol use: No     Alcohol/week: 0.0 standard drinks     Drug use: No       VITALS:  /65   Pulse 61   Temp 97.6  F (36.4  C) (Oral)   Resp 20   Wt 77.1 kg (170 lb)   SpO2 96%   BMI 30.11 kg/m      PHYSICAL EXAM      Vitals: /65   Pulse 61   Temp 97.6  F (36.4  C) (Oral)   Resp 20   Wt 77.1 kg (170 lb)   SpO2 96%   BMI 30.11 kg/m    General: Appears in no acute distress, awake, alert, interactive, well appearing.  Eyes: Conjunctivae non-injected. Sclera anicteric.  HENT: Atraumatic.  Neck: Supple.  Respiratory/Chest: Respiration unlabored.  Cardiovascular: Regular rate and rhythm.  Abdomen: non distended. Low abdominal tenderness, no guarding.  Musculoskeletal: Normal extremities. No edema or erythema. Thoracic spine tenderness.  Skin: Normal color. No rash or diaphoresis.  Neurologic: Face symmetric, moves all extremities spontaneously. Speech clear.  Psychiatric: Oriented to person, place, and time. Affect appropriate.       LAB:  All pertinent labs reviewed and  interpreted.  Results for orders placed or performed during the hospital encounter of 09/08/22   CTA Chest Abdomen Pelvis w Contrast   Result Value Ref Range    Radiologist flags (AA)      Abnormal proximal SMA which could represent an ulcerative atheromatous plaque, small area of thrombus, or less likely localized dissection.    Impression    IMPRESSION:  1.  There is an abnormal low density filling defects seen in the proximal aspect of the SMA just distal to the origin and to the left of midline measuring approximately 1.0 cm x 0.5 cm x 0.3 cm and findings could represent a partially ulcerated   atheromatous plaque, a localized area of thrombus, or less likely a localized dissection. The SMA is otherwise normal in appearance to include the distal branches.    2.  No evidence for bowel ischemia.    3.  Approximately 75% stenosis origin of the left renal artery.    4.  2.9 x 2.6 cm cyst right ovary, please refer to below reference for possible follow-up.    REFERENCE:  Management of Incidental Adnexal Findings on CT and MRI: A White Paper of the ACR Incidental Findings Committee. J Am Halie Radiol 2020; 17(2):248-254.    Postmenopausal or equal to or >50 years if status unknown:    Equal to or <3 cm: No further imaging.  >3 cm on CT: Ultrasound.  Equal to or <5 cm on MRI: No follow-up if fully characterized.  >3 cm on MRI: Ultrasound in 6-12 months if not fully characterized.  >5 cm on MRI: Ultrasound in 6-12 months even if fully characterized.      [Critical Result: Abnormal proximal SMA which could represent an ulcerative atheromatous plaque, small area of thrombus, or less likely localized dissection.]    Finding was identified on 9/8/2022 7:15PM.     Dr. Eduardo was contacted by me on 9/8/2022 7:25 PM and verbalized understanding of the critical result.        Head CT w/o contrast    Impression    IMPRESSION:  1.  No acute intracranial abnormalities identified.   CT Thoracic Spine w/o Contrast    Impression     IMPRESSION:  1.  No fracture or posttraumatic subluxation.  2.  No high-grade spinal canal or neural foraminal stenosis.     Cervical spine CT w/o contrast    Impression    IMPRESSION:  1.  No fracture or posttraumatic subluxation.  2.  No high-grade spinal canal or neural foraminal stenosis.   Comprehensive metabolic panel   Result Value Ref Range    Sodium 137 136 - 145 mmol/L    Potassium 4.2 3.5 - 5.0 mmol/L    Chloride 103 98 - 107 mmol/L    Carbon Dioxide (CO2) 24 22 - 31 mmol/L    Anion Gap 10 5 - 18 mmol/L    Urea Nitrogen 40 (H) 8 - 28 mg/dL    Creatinine 2.00 (H) 0.60 - 1.10 mg/dL    Calcium 9.0 8.5 - 10.5 mg/dL    Glucose 258 (H) 70 - 125 mg/dL    Alkaline Phosphatase 60 45 - 120 U/L    AST 13 0 - 40 U/L    ALT 14 0 - 45 U/L    Protein Total 7.2 6.0 - 8.0 g/dL    Albumin 3.8 3.5 - 5.0 g/dL    Bilirubin Total 0.7 0.0 - 1.0 mg/dL    GFR Estimate 26 (L) >60 mL/min/1.73m2   Result Value Ref Range    Lipase <9 0 - 52 U/L   Lactic acid whole blood   Result Value Ref Range    Lactic Acid 1.3 0.7 - 2.0 mmol/L   Result Value Ref Range    Troponin I 0.01 0.00 - 0.29 ng/mL   CBC with platelets and differential   Result Value Ref Range    WBC Count 6.7 4.0 - 11.0 10e3/uL    RBC Count 3.96 3.80 - 5.20 10e6/uL    Hemoglobin 11.9 11.7 - 15.7 g/dL    Hematocrit 37.1 35.0 - 47.0 %    MCV 94 78 - 100 fL    MCH 30.1 26.5 - 33.0 pg    MCHC 32.1 31.5 - 36.5 g/dL    RDW 12.0 10.0 - 15.0 %    Platelet Count 200 150 - 450 10e3/uL    % Neutrophils 67 %    % Lymphocytes 23 %    % Monocytes 7 %    % Eosinophils 2 %    % Basophils 1 %    % Immature Granulocytes 0 %    NRBCs per 100 WBC 0 <1 /100    Absolute Neutrophils 4.6 1.6 - 8.3 10e3/uL    Absolute Lymphocytes 1.5 0.8 - 5.3 10e3/uL    Absolute Monocytes 0.4 0.0 - 1.3 10e3/uL    Absolute Eosinophils 0.1 0.0 - 0.7 10e3/uL    Absolute Basophils 0.1 0.0 - 0.2 10e3/uL    Absolute Immature Granulocytes 0.0 <=0.4 10e3/uL    Absolute NRBCs 0.0 10e3/uL   Result Value Ref Range     Magnesium 1.9 1.8 - 2.6 mg/dL   Troponin I (now)   Result Value Ref Range    Troponin I 0.02 0.00 - 0.29 ng/mL       RADIOLOGY:  Reviewed all pertinent imaging. Please see official radiology report.  CTA Chest Abdomen Pelvis w Contrast   Final Result   Abnormal   IMPRESSION:   1.  There is an abnormal low density filling defects seen in the proximal aspect of the SMA just distal to the origin and to the left of midline measuring approximately 1.0 cm x 0.5 cm x 0.3 cm and findings could represent a partially ulcerated    atheromatous plaque, a localized area of thrombus, or less likely a localized dissection. The SMA is otherwise normal in appearance to include the distal branches.      2.  No evidence for bowel ischemia.      3.  Approximately 75% stenosis origin of the left renal artery.      4.  2.9 x 2.6 cm cyst right ovary, please refer to below reference for possible follow-up.      REFERENCE:   Management of Incidental Adnexal Findings on CT and MRI: A White Paper of the ACR Incidental Findings Committee. J Am Halie Radiol 2020; 17(2):248-254.      Postmenopausal or equal to or >50 years if status unknown:      Equal to or <3 cm: No further imaging.   >3 cm on CT: Ultrasound.   Equal to or <5 cm on MRI: No follow-up if fully characterized.   >3 cm on MRI: Ultrasound in 6-12 months if not fully characterized.   >5 cm on MRI: Ultrasound in 6-12 months even if fully characterized.         [Critical Result: Abnormal proximal SMA which could represent an ulcerative atheromatous plaque, small area of thrombus, or less likely localized dissection.]      Finding was identified on 9/8/2022 7:15PM.       Dr. Eduardo was contacted by me on 9/8/2022 7:25 PM and verbalized understanding of the critical result.             CT Thoracic Spine w/o Contrast   Final Result   IMPRESSION:   1.  No fracture or posttraumatic subluxation.   2.  No high-grade spinal canal or neural foraminal stenosis.         Cervical spine CT w/o  contrast   Final Result   IMPRESSION:   1.  No fracture or posttraumatic subluxation.   2.  No high-grade spinal canal or neural foraminal stenosis.      Head CT w/o contrast   Final Result   IMPRESSION:   1.  No acute intracranial abnormalities identified.          EKG:    Performed at: 14:58    Impression: Sinus rhythm. Prolonged QT    Rate: 61 bpm  Rhythm: Sinus  Axis: -8  LA Interval: 194 ms  QRS Interval: 106 ms  QTc Interval: 493 ms  ST Changes: None  Comparison: When compared with EKG of 12-MAR-2013, Criteria for inferior infarct are no longer present. T wave amplitude has increased in anterior leads. T wave inversion now evident in lateral leads    I have independently reviewed and interpreted the EKG(s) documented above.    PROCEDURES:   none      I, Bud Francisco, am serving as a scribe to document services personally performed by Fam Eduardo MD based on my observation and the provider's statements to me. I, Dr. Fam Eduardo, attest that Bud Francisco is acting in a scribe capacity, has observed my performance of the services and has documented them in accordance with my direction.    Fam Eduardo MD  Emergency Medicine  [facility] EMERGENCY DEPARTMENT  [address] [place] [id number]  [phone]     Fam Eduardo MD  09/08/22 2042       Fam Eduardo MD  09/08/22 2054       Fam Eduardo MD  09/08/22 2057

## 2022-09-10 LAB
ANION GAP SERPL CALCULATED.3IONS-SCNC: 7 MMOL/L (ref 5–18)
ATRIAL RATE - MUSE: 61 BPM
BUN SERPL-MCNC: 22 MG/DL (ref 8–28)
CALCIUM SERPL-MCNC: 8.7 MG/DL (ref 8.5–10.5)
CHLORIDE BLD-SCNC: 108 MMOL/L (ref 98–107)
CO2 SERPL-SCNC: 23 MMOL/L (ref 22–31)
CREAT SERPL-MCNC: 1.14 MG/DL (ref 0.6–1.1)
DIASTOLIC BLOOD PRESSURE - MUSE: NORMAL MMHG
ERYTHROCYTE [DISTWIDTH] IN BLOOD BY AUTOMATED COUNT: 12.2 % (ref 10–15)
GFR SERPL CREATININE-BSD FRML MDRD: 51 ML/MIN/1.73M2
GLUCOSE BLD-MCNC: 172 MG/DL (ref 70–125)
GLUCOSE BLDC GLUCOMTR-MCNC: 147 MG/DL (ref 70–99)
GLUCOSE BLDC GLUCOMTR-MCNC: 173 MG/DL (ref 70–99)
GLUCOSE BLDC GLUCOMTR-MCNC: 249 MG/DL (ref 70–99)
GLUCOSE BLDC GLUCOMTR-MCNC: 276 MG/DL (ref 70–99)
HCT VFR BLD AUTO: 34.3 % (ref 35–47)
HGB BLD-MCNC: 11.1 G/DL (ref 11.7–15.7)
INTERPRETATION ECG - MUSE: NORMAL
MCH RBC QN AUTO: 30 PG (ref 26.5–33)
MCHC RBC AUTO-ENTMCNC: 32.4 G/DL (ref 31.5–36.5)
MCV RBC AUTO: 93 FL (ref 78–100)
P AXIS - MUSE: 25 DEGREES
PLATELET # BLD AUTO: 176 10E3/UL (ref 150–450)
POTASSIUM BLD-SCNC: 4.2 MMOL/L (ref 3.5–5)
PR INTERVAL - MUSE: 194 MS
QRS DURATION - MUSE: 106 MS
QT - MUSE: 490 MS
QTC - MUSE: 493 MS
R AXIS - MUSE: -8 DEGREES
RBC # BLD AUTO: 3.7 10E6/UL (ref 3.8–5.2)
SODIUM SERPL-SCNC: 138 MMOL/L (ref 136–145)
SYSTOLIC BLOOD PRESSURE - MUSE: NORMAL MMHG
T AXIS - MUSE: 88 DEGREES
VENTRICULAR RATE- MUSE: 61 BPM
WBC # BLD AUTO: 4.6 10E3/UL (ref 4–11)

## 2022-09-10 PROCEDURE — 250N000012 HC RX MED GY IP 250 OP 636 PS 637: Performed by: INTERNAL MEDICINE

## 2022-09-10 PROCEDURE — 250N000011 HC RX IP 250 OP 636: Performed by: INTERNAL MEDICINE

## 2022-09-10 PROCEDURE — 85027 COMPLETE CBC AUTOMATED: CPT | Performed by: STUDENT IN AN ORGANIZED HEALTH CARE EDUCATION/TRAINING PROGRAM

## 2022-09-10 PROCEDURE — 36415 COLL VENOUS BLD VENIPUNCTURE: CPT | Performed by: STUDENT IN AN ORGANIZED HEALTH CARE EDUCATION/TRAINING PROGRAM

## 2022-09-10 PROCEDURE — 250N000013 HC RX MED GY IP 250 OP 250 PS 637: Performed by: INTERNAL MEDICINE

## 2022-09-10 PROCEDURE — 99232 SBSQ HOSP IP/OBS MODERATE 35: CPT | Performed by: INTERNAL MEDICINE

## 2022-09-10 PROCEDURE — 80048 BASIC METABOLIC PNL TOTAL CA: CPT | Performed by: STUDENT IN AN ORGANIZED HEALTH CARE EDUCATION/TRAINING PROGRAM

## 2022-09-10 PROCEDURE — 210N000001 HC R&B IMCU HEART CARE

## 2022-09-10 PROCEDURE — 96375 TX/PRO/DX INJ NEW DRUG ADDON: CPT

## 2022-09-10 RX ORDER — HYDRALAZINE HYDROCHLORIDE 20 MG/ML
10 INJECTION INTRAMUSCULAR; INTRAVENOUS EVERY 6 HOURS PRN
Status: DISCONTINUED | OUTPATIENT
Start: 2022-09-10 | End: 2022-09-17 | Stop reason: HOSPADM

## 2022-09-10 RX ADMIN — INSULIN ASPART 2 UNITS: 100 INJECTION, SOLUTION INTRAVENOUS; SUBCUTANEOUS at 12:08

## 2022-09-10 RX ADMIN — INSULIN GLARGINE 10 UNITS: 100 INJECTION, SOLUTION SUBCUTANEOUS at 22:43

## 2022-09-10 RX ADMIN — ROSUVASTATIN CALCIUM 40 MG: 40 TABLET, FILM COATED ORAL at 09:01

## 2022-09-10 RX ADMIN — HYDRALAZINE HYDROCHLORIDE 10 MG: 20 INJECTION, SOLUTION INTRAMUSCULAR; INTRAVENOUS at 11:46

## 2022-09-10 RX ADMIN — INSULIN ASPART 2 UNITS: 100 INJECTION, SOLUTION INTRAVENOUS; SUBCUTANEOUS at 12:09

## 2022-09-10 RX ADMIN — ACETAMINOPHEN 1000 MG: 500 TABLET ORAL at 21:24

## 2022-09-10 RX ADMIN — LISINOPRIL 40 MG: 20 TABLET ORAL at 08:58

## 2022-09-10 RX ADMIN — CLOPIDOGREL BISULFATE 75 MG: 75 TABLET ORAL at 11:57

## 2022-09-10 RX ADMIN — ACETAMINOPHEN 1000 MG: 500 TABLET ORAL at 13:13

## 2022-09-10 RX ADMIN — INSULIN ASPART 1 UNITS: 100 INJECTION, SOLUTION INTRAVENOUS; SUBCUTANEOUS at 09:38

## 2022-09-10 RX ADMIN — LABETALOL HYDROCHLORIDE 10 MG: 5 INJECTION, SOLUTION INTRAVENOUS at 13:47

## 2022-09-10 RX ADMIN — ISOSORBIDE MONONITRATE 60 MG: 60 TABLET, EXTENDED RELEASE ORAL at 09:01

## 2022-09-10 RX ADMIN — METOPROLOL SUCCINATE 200 MG: 100 TABLET, EXTENDED RELEASE ORAL at 09:00

## 2022-09-10 RX ADMIN — HYDRALAZINE HYDROCHLORIDE 10 MG: 20 INJECTION, SOLUTION INTRAMUSCULAR; INTRAVENOUS at 17:59

## 2022-09-10 RX ADMIN — INSULIN ASPART 2 UNITS: 100 INJECTION, SOLUTION INTRAVENOUS; SUBCUTANEOUS at 16:52

## 2022-09-10 RX ADMIN — PANTOPRAZOLE SODIUM 20 MG: 20 TABLET, DELAYED RELEASE ORAL at 08:58

## 2022-09-10 RX ADMIN — Medication 81 MG: at 08:59

## 2022-09-10 ASSESSMENT — ACTIVITIES OF DAILY LIVING (ADL)
ADLS_ACUITY_SCORE: 24
WALKING_OR_CLIMBING_STAIRS: AMBULATION DIFFICULTY, REQUIRES EQUIPMENT
ADLS_ACUITY_SCORE: 38
DEPENDENT_IADLS:: INDEPENDENT
EQUIPMENT_CURRENTLY_USED_AT_HOME: OTHER (SEE COMMENTS)
TOILETING_ISSUES: NO
ADLS_ACUITY_SCORE: 38
ADLS_ACUITY_SCORE: 24
FALL_HISTORY_WITHIN_LAST_SIX_MONTHS: YES
WEAR_GLASSES_OR_BLIND: YES
CONCENTRATING,_REMEMBERING_OR_MAKING_DECISIONS_DIFFICULTY: NO
TRANSFERRING: 0-->INDEPENDENT
CHANGE_IN_FUNCTIONAL_STATUS_SINCE_ONSET_OF_CURRENT_ILLNESS/INJURY: NO
DRESSING/BATHING_DIFFICULTY: NO
DOING_ERRANDS_INDEPENDENTLY_DIFFICULTY: NO
ADLS_ACUITY_SCORE: 34
WALKING_OR_CLIMBING_STAIRS_DIFFICULTY: YES
ADLS_ACUITY_SCORE: 24
ADLS_ACUITY_SCORE: 24
ADLS_ACUITY_SCORE: 38
TRANSFERRING: 0-->INDEPENDENT
DIFFICULTY_EATING/SWALLOWING: NO
ADLS_ACUITY_SCORE: 38
NUMBER_OF_TIMES_PATIENT_HAS_FALLEN_WITHIN_LAST_SIX_MONTHS: 1
ADLS_ACUITY_SCORE: 36
ADLS_ACUITY_SCORE: 24
ADLS_ACUITY_SCORE: 38

## 2022-09-10 NOTE — PLAN OF CARE
Problem: Pain Acute  Goal: Acceptable Pain Control and Functional Ability  Intervention: Develop Pain Management Plan  Recent Flowsheet Documentation  Taken 9/10/2022 1300 by Zarina Avendaño, RN  Pain Management Interventions: medication (see MAR)  Intervention: Prevent or Manage Pain  Recent Flowsheet Documentation  Taken 9/10/2022 1316 by Zarina Avendaño, RN  Medication Review/Management: medications reviewed     Problem: Cardiac Output Decreased (Pulmonary Hypertension)  Goal: Effective Cardiac Output  Outcome: Ongoing, Progressing   Goal Outcome Evaluation:    Admit from ER at 1330.  C/o lower quadrant pain 5/10.  Gave tylenol.  /72 gave labetalol IV at 1400. SBA with cane to bathroom.  A & O x3.  Room air.  /64 at 1445.

## 2022-09-10 NOTE — CONSULTS
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 Low likelihood that her abdominal pain that has been present for last 4 years is related to short segment non flow limiting dissection of SMA    Continue ASA, plavix and statin     Downtrending Cr. 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    Patient reports epigastric pain radiating to back and around her costal margins. Unrelated to food intake., Reports pain has been present for last 4 years (2018) . Workup included normal lactate and WBC. CTA shows abnormal low density filling defects seen in the proximal aspect of the SMA just distal to the origin likely a short segment dissection. No evidence of bowel ischemia. Patient has been on ASA and plavix and statin .     Past Medical History:  Past Medical History:   Diagnosis Date     Asthma in adult      Diabetes mellitus (H)      Hypertension        Past Surgical History:  Past Surgical History:   Procedure Laterality Date     CHOLECYSTECTOMY       COLONOSCOPY N/A 5/11/2016     Procedure: COLONOSCOPY;  Surgeon: Ady Sandoval MD;  Location:  GI     LAPAROSCOPIC TUBAL LIGATION       LUMPECTOMY BREAST Right 6/22/2016    Procedure: LUMPECTOMY BREAST;  Surgeon: Shameka Nino MD;  Location: [facility]       Allergies:     Allergies   Allergen Reactions     Seasonal Allergies      Congesion, pain in sinues, lots of sputum     Oxycodone-Acetaminophen Itching     Tramadol Diarrhea       Medications:  Bupivacaine 0.5%  iopamidol (ISOVUE-M 200) solution 20 mL  lidocaine (PF) (XYLOCAINE) 1 % injection 30 mL  methylPREDNISolone acetate (DEPO-MEDROL) injection 80 mg    Acetaminophen (ARTHRITIS PAIN RELIEF PO), Take 650-1,300 mg by mouth every 8 hours as needed Takes 2 tablets daily in am.Christine Escalante LPN 3:00 PM on 6/8/2018  albuterol (PROAIR HFA, PROVENTIL HFA, VENTOLIN HFA) 108 (90 BASE) MCG/ACT inhaler, Inhale 2 puffs into the lungs every 6 hours as needed for shortness of breath / dyspnea or wheezing (Patient taking differently: Inhale 2 puffs into the lungs every 4 hours as needed for shortness of breath / dyspnea or wheezing)  alendronate (FOSAMAX) 70 MG tablet, Take 70 mg by mouth every 7 days Qweek on sunday  aspirin 81 MG EC tablet, Take 81 mg by mouth daily  benzocaine-menthol (CEPACOL) 15-3.6 MG lozenge, Place 1 lozenge inside cheek every 2 hours as needed for moderate pain  glipiZIDE (GLUCOTROL) 10 MG tablet, Take 10 mg by mouth 2 times daily (before meals)  ipratropium - albuterol 0.5 mg/2.5 mg/3 mL (DUONEB) 0.5-2.5 (3) MG/3ML neb solution, Take 1 vial by nebulization every 6 hours as needed for shortness of breath / dyspnea or wheezing  isosorbide mononitrate (IMDUR) 60 MG 24 hr tablet, Take 60 mg by mouth daily  lisinopril (ZESTRIL) 40 MG tablet, Take 40 mg by mouth daily  loratadine (CLARITIN) 10 MG tablet, Take 10 mg by mouth daily as needed for allergies  metFORMIN (GLUCOPHAGE-XR) 500 MG 24 hr tablet, Take 500 mg by mouth daily (with breakfast)  metoprolol succinate ER (TOPROL  XL) 200 MG 24 hr tablet, Take 200 mg by mouth daily  nitroGLYcerin (NITROSTAT) 0.4 MG sublingual tablet, Place 0.4 mg under the tongue every 5 minutes as needed for chest pain For chest pain place 1 tablet under the tongue every 5 minutes for 3 doses. If symptoms persist 5 minutes after 1st dose call 911.  omeprazole (PRILOSEC) 20 MG DR capsule, Take 20 mg by mouth daily  rosuvastatin (CRESTOR) 40 MG tablet, Take 40 mg by mouth daily  vitamin D3 (CHOLECALCIFEROL) 50 mcg (2000 units) tablet, Take 1 tablet by mouth daily  blood glucose monitoring (NO BRAND SPECIFIED) meter device kit, Use to test blood sugar 3 times daily or as directed.  COMFORT LANCETS MISC, 1 each 3 times daily        Social History:  Social History     Socioeconomic History     Marital status:      Spouse name: Not on file     Number of children: Not on file     Years of education: Not on file     Highest education level: Not on file   Occupational History     Not on file   Tobacco Use     Smoking status: Never Smoker     Smokeless tobacco: Never Used   Substance and Sexual Activity     Alcohol use: No     Alcohol/week: 0.0 standard drinks     Drug use: No     Sexual activity: Not on file   Other Topics Concern     Not on file   Social History Narrative     Not on file     Social Determinants of Health     Financial Resource Strain: Not on file   Food Insecurity: Not on file   Transportation Needs: Not on file   Physical Activity: Not on file   Stress: Not on file   Social Connections: Not on file   Intimate Partner Violence: Not on file   Housing Stability: Not on file       Family History:  Family History   Problem Relation Age of Onset     Esophageal Cancer Mother      Lung Cancer Father      Liver Cancer Father      Lymphoma Daughter      Bone Cancer Maternal Aunt      Leukemia Maternal Aunt        ROS:    The remainder of the complete ROS was negative unless noted in the HPI.    Exam:  BP (!) 144/66 (BP Location: Left arm)   Pulse 58   " Temp 97.9  F (36.6  C) (Oral)   Resp 24   Ht 1.6 m (5' 3\")   Wt 77.1 kg (170 lb)   SpO2 97%   BMI 30.11 kg/m    General: Alert and oriented with appropriate responses to questions, in NAD.  HEENT: NC/AT, sclera anicteric, PERRL, EOMI, OP clear with MMM  Neck: Supple, no JVD or cervical LAD, full aROM.  Resp: clear to auscultation bilaterally, no crackles or wheezes  Cardiac: regular rate and rhythm, no murmur.  Abdomen: Soft, tender around epigastrium  nondistended. No rebound or guarding.  Extremities: bilateral  LE edema, 5/5 strength, warm well perfused extremities   Skin: Warm and dry, no jaundice or rash  Neuro: Cn II-XII intact, moves all extremities equally    Labs:  Most Recent CBC:   Recent Labs   Lab Test 09/09/22  0805   WBC 4.8   RBC 3.71*   HGB 11.1*   HCT 34.4*   MCV 93   MCH 29.9   MCHC 32.3   RDW 12.2        Most Recent BMP:   Recent Labs   Lab Test 09/09/22  2129 09/09/22  0820 09/09/22  0805   NA  --   --  138   POTASSIUM  --   --  3.8   CHLORIDE  --   --  108*   CO2  --   --  24   BUN  --   --  31*   CR  --   --  1.25*   *   < > 133*   MAG  --   --  1.8    < > = values in this interval not displayed.       Imaging:  No results found for this or any previous visit (from the past 24 hour(s)).    Assessment/ Plan: See above.     Krista Duffy MD     Fellow  Pager: [phone]    Pt reviewed with Dr. Merritt.     "

## 2022-09-10 NOTE — CONSULTS
Care Management Initial Consult    General Information  Assessment completed with: Patient, Bettina  Type of CM/SW Visit: Initial Assessment    Primary Care Provider verified and updated as needed: Yes   Readmission within the last 30 days:        Reason for Consult: discharge planning  Advance Care Planning:            Communication Assessment  Patient's communication style: spoken language (English or Bilingual)             Cognitive  Cognitive/Neuro/Behavioral: WDL                      Living Environment:   People in home: spouse     Current living Arrangements: apartment      Able to return to prior arrangements:         Family/Social Support:  Care provided by: self  Provides care for: no one  Marital Status:             Description of Support System:           Current Resources:   Patient receiving home care services:       Community Resources:    Equipment currently used at home:    Supplies currently used at home:      Employment/Financial:  Employment Status: retired        Financial Concerns:             Lifestyle & Psychosocial Needs:  Social Determinants of Health     Tobacco Use: Low Risk      Smoking Tobacco Use: Never Smoker     Smokeless Tobacco Use: Never Used   Alcohol Use: Not on file   Financial Resource Strain: Not on file   Food Insecurity: Not on file   Transportation Needs: Not on file   Physical Activity: Not on file   Stress: Not on file   Social Connections: Not on file   Intimate Partner Violence: Not on file   Depression: Not on file   Housing Stability: Not on file       Functional Status:  Prior to admission patient needed assistance:   Dependent ADLs:: Independent, Ambulation-cane  Dependent IADLs:: Independent       Mental Health Status:          Chemical Dependency Status:                Values/Beliefs:  Spiritual, Cultural Beliefs, Spiritism Practices, Values that affect care:                 Additional Information:  Met with patient at bedside. Patient is largely  independent at home with spouse. Utilizes a cane with ambulation. Patient lives in a apartment.  Goal will be discharge home, family to transport at time of discharge. Patient stated she has her covid vaccines x4, per epic nothing documented, copy obtained and placed on paper chart.  CV surgery to see patient. Patient provided RNCM an update on her current symptoms.  Review of chart, uncertain if patient is a poor historian, doesn't recall meeting with CV surgery team, per chart notes are in and patient was reviewed.     CM to follow care progression and aide in discharge planning as needed.     Patricia Garcias RN

## 2022-09-10 NOTE — ED NOTES
"[facility] ED Handoff Report    ED Chief Complaint: syncope, chest tightness    ED Diagnosis:  (R19.7) Diarrhea, unspecified type  Comment: n/a  Plan: n/a    (N17.9) Acute renal failure, unspecified acute renal failure type (H)  Comment: n/a  Plan: n/a    (W19.XXXA) Fall, initial encounter  Comment: n/a  Plan: n/a    (R07.9) Chest pain, unspecified type  Comment: n/a  Plan: n/a    (S35.229A) Superior mesenteric artery (trunk) injury, initial encounter  Comment: n/a  Plan: n/a       PMH:    Past Medical History:   Diagnosis Date     Asthma in adult      Diabetes mellitus (H)      Hypertension         Code Status:  Full Code     Falls Risk: Yes Band: Applied    Current Living Situation/Residence: lives with a significant other     Elimination Status: Continent: Yes     Activity Level: SBA w/ walker    Patients Preferred Language:  English     Needed: No    Vital Signs:  BP (!) 181/79   Pulse 58   Temp 97.8  F (36.6  C) (Oral)   Resp 23   Ht 1.6 m (5' 3\")   Wt 77.1 kg (170 lb)   SpO2 96%   BMI 30.11 kg/m       Cardiac Rhythm: SR, SB    Pain Score: 0/10    Is the Patient Confused:  No    Last Food or Drink: 09/10/22 at 0900    Focused Assessment:  Pt is awake and alert. Per last shift uses cane for ambulation, standby. Lungs clear. Pt has intermittent pain to right lower, anterior leg(pinpoint pain, no diffuse, not calf). Pt has intermittent \"squeezing\" around chest. Pt is sr,sb with elevated BP's at times (receiving med at this time). Pt is diabetic (2 units of insulin this am with breakfast which is a liquid diet). Lunch not here yet.  Lungs clear, plus one edema to legs. Good pulses to extremities. PT is AAO x 3. Pt's CT yesterday showed renal artery and mesenteric artery blockage.. Abdomen soft, non-distended. No diarrhea, no c/o pain.    Tests Performed: Done: Labs and Imaging    Treatments Provided:  meds    Family Dynamics/Concerns: No    Family Updated On Visitor Policy: Yes    Plan of " Care Communicated to Family: Yes    Who Was Updated about Plan of Care:     Belongings Checklist Done and Signed by Patient: Yes    Medications sent with patient: yes, insulin    Covid: asymptomatic , negative    Additional Information: n/a    RN: Lelia Tony 9/10/2022 11:44 AM

## 2022-09-10 NOTE — PROGRESS NOTES
NUTRITION EDUCATION      REASON FOR Consult:  Consult to educate pt on diabetic diet    Met pt.  Pt reports pain in her side and squeezing pain around her chest that comes and goes.  Pt experienced some of this pain during our visit.  RN present at the end of our visit.      Plan:  Diet education planned tomorrow as pt experiencing pain today.

## 2022-09-10 NOTE — PLAN OF CARE
Problem: Plan of Care   Goal: Readiness for Transition of Care  Outcome: Ongoing, Progressing     Problem: Pain Acute  Goal: Acceptable Pain Control and Functional Ability  Outcome: Ongoing, Progressing  Intervention: Develop Pain Management Plan  Intervention: Prevent or Manage Pain     Pt reports constant upper abdominal pain, radiating to left flank.  States it feels sore and squeezing with intermittent spasms by her left ribs.  Rates pain 6/10, improved after prn tylenol.  Tolerating clear liquids.  Up to bathroom via SBA with cane, voiding.  Tele NSR/ sinus chiki when sleeping.

## 2022-09-11 LAB
ANION GAP SERPL CALCULATED.3IONS-SCNC: 6 MMOL/L (ref 5–18)
BUN SERPL-MCNC: 14 MG/DL (ref 8–28)
CALCIUM SERPL-MCNC: 8.8 MG/DL (ref 8.5–10.5)
CHLORIDE BLD-SCNC: 106 MMOL/L (ref 98–107)
CO2 SERPL-SCNC: 22 MMOL/L (ref 22–31)
CREAT SERPL-MCNC: 1.14 MG/DL (ref 0.6–1.1)
GFR SERPL CREATININE-BSD FRML MDRD: 51 ML/MIN/1.73M2
GLUCOSE BLD-MCNC: 277 MG/DL (ref 70–125)
GLUCOSE BLDC GLUCOMTR-MCNC: 142 MG/DL (ref 70–99)
GLUCOSE BLDC GLUCOMTR-MCNC: 143 MG/DL (ref 70–99)
GLUCOSE BLDC GLUCOMTR-MCNC: 199 MG/DL (ref 70–99)
GLUCOSE BLDC GLUCOMTR-MCNC: 255 MG/DL (ref 70–99)
HOLD SPECIMEN: NORMAL
HOLD SPECIMEN: NORMAL
POTASSIUM BLD-SCNC: 5 MMOL/L (ref 3.5–5)
SODIUM SERPL-SCNC: 134 MMOL/L (ref 136–145)

## 2022-09-11 PROCEDURE — 36415 COLL VENOUS BLD VENIPUNCTURE: CPT | Performed by: INTERNAL MEDICINE

## 2022-09-11 PROCEDURE — 210N000001 HC R&B IMCU HEART CARE

## 2022-09-11 PROCEDURE — 250N000011 HC RX IP 250 OP 636: Performed by: INTERNAL MEDICINE

## 2022-09-11 PROCEDURE — 250N000013 HC RX MED GY IP 250 OP 250 PS 637: Performed by: INTERNAL MEDICINE

## 2022-09-11 PROCEDURE — 99221 1ST HOSP IP/OBS SF/LOW 40: CPT | Mod: FS | Performed by: SURGERY

## 2022-09-11 PROCEDURE — 80048 BASIC METABOLIC PNL TOTAL CA: CPT | Performed by: INTERNAL MEDICINE

## 2022-09-11 PROCEDURE — 99232 SBSQ HOSP IP/OBS MODERATE 35: CPT | Performed by: INTERNAL MEDICINE

## 2022-09-11 RX ORDER — AMLODIPINE BESYLATE 5 MG/1
5 TABLET ORAL EVERY EVENING
Status: DISCONTINUED | OUTPATIENT
Start: 2022-09-11 | End: 2022-09-16

## 2022-09-11 RX ADMIN — PANTOPRAZOLE SODIUM 20 MG: 20 TABLET, DELAYED RELEASE ORAL at 09:30

## 2022-09-11 RX ADMIN — Medication 81 MG: at 09:30

## 2022-09-11 RX ADMIN — HYDRALAZINE HYDROCHLORIDE 10 MG: 20 INJECTION, SOLUTION INTRAMUSCULAR; INTRAVENOUS at 15:40

## 2022-09-11 RX ADMIN — CLOPIDOGREL BISULFATE 75 MG: 75 TABLET ORAL at 10:22

## 2022-09-11 RX ADMIN — INSULIN GLARGINE 10 UNITS: 100 INJECTION, SOLUTION SUBCUTANEOUS at 21:42

## 2022-09-11 RX ADMIN — LISINOPRIL 40 MG: 20 TABLET ORAL at 09:46

## 2022-09-11 RX ADMIN — INSULIN ASPART 1 UNITS: 100 INJECTION, SOLUTION INTRAVENOUS; SUBCUTANEOUS at 17:22

## 2022-09-11 RX ADMIN — HYDRALAZINE HYDROCHLORIDE 10 MG: 20 INJECTION, SOLUTION INTRAMUSCULAR; INTRAVENOUS at 00:07

## 2022-09-11 RX ADMIN — ROSUVASTATIN CALCIUM 40 MG: 40 TABLET, FILM COATED ORAL at 09:29

## 2022-09-11 RX ADMIN — HYDRALAZINE HYDROCHLORIDE 10 MG: 20 INJECTION, SOLUTION INTRAMUSCULAR; INTRAVENOUS at 07:04

## 2022-09-11 RX ADMIN — ISOSORBIDE MONONITRATE 60 MG: 60 TABLET, EXTENDED RELEASE ORAL at 10:22

## 2022-09-11 RX ADMIN — METOPROLOL SUCCINATE 200 MG: 100 TABLET, EXTENDED RELEASE ORAL at 09:47

## 2022-09-11 RX ADMIN — HYDRALAZINE HYDROCHLORIDE 10 MG: 20 INJECTION, SOLUTION INTRAMUSCULAR; INTRAVENOUS at 22:27

## 2022-09-11 ASSESSMENT — ACTIVITIES OF DAILY LIVING (ADL)
ADLS_ACUITY_SCORE: 26
ADLS_ACUITY_SCORE: 24
ADLS_ACUITY_SCORE: 26
ADLS_ACUITY_SCORE: 24
ADLS_ACUITY_SCORE: 26

## 2022-09-11 NOTE — PROGRESS NOTES
Care Management Follow Up    Length of Stay (days): 3    Expected Discharge Date: 09/12/2022     Concerns to be Addressed:     Vascular following-monitor labs (creatinine trending down), plan on repeat CTA in next 24 hours.   Patient plan of care discussed at interdisciplinary rounds: Yes    Anticipated Discharge Disposition:  Home     Anticipated Discharge Services:  None anticipated   Anticipated Discharge DME:  None anticipated     Patient/family educated on Medicare website which has current facility and service quality ratings:  N/A  Education Provided on the Discharge Plan:  Per team  Patient/Family in Agreement with the Plan:  Yes    Referrals Placed by CM/SW:  None at this time   Private pay costs discussed: Not applicable    Additional Information:  Chart Reviewed. From home with spouse in an apartment; mostly independent at baseline. Has cane available for use. Goal to return home at discharge. Family to transport. Care manager to follow.       Dayanna Arevalo RN

## 2022-09-11 NOTE — PROGRESS NOTES
[facility]    Medicine Progress Note - Hospitalist Service    Date of Admission:  9/8/2022    Assessment & Plan          Bettina Guerrero is a 72 year old female with history DM, HTN, COPD, HLD, multivessel CAD and ischemic cardiomyopathy with EF of 40%, CKD 3, admitted on 9/8/2022 with abdominal pain and found to have subacute arterial dissection of SMA.   Pain (which she states was new compared to other pains she has had in the past) is slowly improving so will advance diet - discussed with vascular.  Repeat CTA 9/12.       Abdominal pain  Subacute arterial dissection of the SMA.  CTA showed 75% stenosis of proximal left renal artery, short segment of SMA stenosis, partially ulcerated atheromatous plaque proximal SMA and no evidence of bowel ischemia. As per vascular surgeon, findings are likely secondary to subacute arterial dissection.   Advance diet as tolerated  Keep SBP < 130 and HR < 70  Continue Aspirin and plavix  Continue rosuvastatin 40 mg daily    Repeat CT scan 9/12/22 per vascular surgery service      Essential hypertension  Keep SBP < 130 per vascular surgeon   Continue PTA lisinopril and Toprol-XL  Add norvasc to get BP below goal  Prn labetalol IV        Multivessel CAD ischemic cardiomyopathy, EF 40%.   Chest pain   Troponin negative X3.  NSR on EKG.  Continue PT Imdur, aspirin, lisinopril, Toprol-XL.     Non-insulin-dependent diabetes mellitus.    A1c 12.4 in June 2018; repeat A1c on admit was 10.0  Hold PTA metformin and glipizide.   Insulin sliding scale   Has been running high, so added lantus - will increase to 16 units hs  Insulin carb coverage 1:15      RENEA/Stage III CKD   Improved at 1.14 down from Creatinine 2.0 on 9/8/22 compared to 1.14 on 8/12/22  Possibly prerenal given relative hypotension vs cardiorenal process given ischemic cadriomyopathy  He received IV NS 1L bolus in the ER.   Monitor BMP  Avoid nephrotoxins      GERD  Continue pantoprazole  "     COPD  Not in exacerbation  Continue home inhalers.    Hyponatremia  Mild at 134 - follow         Diet: Clear Liquid Diet    DVT Prophylaxis: Pneumatic Compression Devices  Muniz Catheter: Not present  Central Lines: None  Cardiac Monitoring: None  Code Status: Full Code      Disposition Plan      Expected Discharge Date: 09/12/2022, 12:00 PM    Destination: home;home with family          The patient's care was discussed with the Bedside Nurse and Patient.    José Antonio Chavez MD  Hospitalist Service  [facility]  Securely message with the Vocera Web Console (learn more here)  Text page via "[employer] Paging/Directory         Clinically Significant Risk Factors Present on Admission               # Obesity: Estimated body mass index is 30.57 kg/m  as calculated from the following:    Height as of this encounter: 1.6 m (5' 3\").    Weight as of this encounter: 78.3 kg (172 lb 9.6 oz).        ______________________________________________________________________    Interval History   Still having some abdominal pain but down to 5/10 (was 8/10).  She states this upper abdominal pain is totally new for her.  She has had other pains in lower chest and other parts of her abdomen which have occurred on and off over several years.  Clear liquids do bother her some.    Data reviewed today: I reviewed all medications, new labs and imaging results over the last 24 hours.   Physical Exam   Vital Signs: Temp: 97.8  F (36.6  C) Temp src: Oral BP: 98/49 Pulse: 72   Resp: 20 SpO2: 96 % O2 Device: None (Room air)    Weight: 172 lbs 9.6 oz  General Appearance: Older F in NAD  Respiratory: occas rhonchi  Cardiovascular: RRR S1S2, no edema  GI: +BS, soft, tender more on right but somewhat throughout.  No rebound or guarding  Skin: no rashes or lesions on exposed areas  Neuro: Alert, generally nonfocal on motor and sensory testing      Data   Recent Labs   Lab 09/10/22  2020 09/10/22  1630 09/10/22  1208 09/10/22  0855 " 09/10/22  0850 09/09/22  0820 09/09/22  0805 09/09/22  0022 09/08/22  1609   WBC  --   --   --   --  4.6  --  4.8  --  6.7   HGB  --   --   --   --  11.1*  --  11.1*  --  11.9   MCV  --   --   --   --  93  --  93  --  94   PLT  --   --   --   --  176  --  184  --  200   NA  --   --   --   --  138  --  138  --  137   POTASSIUM  --   --   --   --  4.2  --  3.8  --  4.2   CHLORIDE  --   --   --   --  108*  --  108*  --  103   CO2  --   --   --   --  23  --  24  --  24   BUN  --   --   --   --  22  --  31*  --  40*   CR  --   --   --   --  1.14*  --  1.25*  --  2.00*   ANIONGAP  --   --   --   --  7  --  6  --  10   DEB  --   --   --   --  8.7  --  8.2*  --  9.0   * 276* 249*   < > 172*   < > 133*   < > 258*   ALBUMIN  --   --   --   --   --   --  3.2*  --  3.8   PROTTOTAL  --   --   --   --   --   --  6.0  --  7.2   BILITOTAL  --   --   --   --   --   --  0.5  --  0.7   ALKPHOS  --   --   --   --   --   --  52  --  60   ALT  --   --   --   --   --   --  10  --  14   AST  --   --   --   --   --   --  12  --  13   LIPASE  --   --   --   --   --   --   --   --  <9    < > = values in this interval not displayed.     Medications       aspirin  81 mg Oral Daily     clopidogrel  75 mg Oral Daily     insulin aspart   Subcutaneous Daily with breakfast     insulin aspart   Subcutaneous Daily with lunch     insulin aspart   Subcutaneous Daily with supper     insulin aspart  1-3 Units Subcutaneous TID AC     insulin aspart  1-3 Units Subcutaneous At Bedtime     insulin glargine  10 Units Subcutaneous At Bedtime     isosorbide mononitrate  60 mg Oral Daily     lisinopril  40 mg Oral Daily     metoprolol succinate ER  200 mg Oral Daily     pantoprazole  20 mg Oral Daily     polyethylene glycol  17 g Oral Daily     rosuvastatin  40 mg Oral Daily     sodium chloride (PF)  3 mL Intracatheter Q8H

## 2022-09-11 NOTE — PROGRESS NOTES
[facility]    Medicine Progress Note - Hospitalist Service    Date of Admission:  9/8/2022    Assessment & Plan          Bettina Guerrero is a 72 year old female with history DM, HTN, COPD, HLD, multivessel CAD and ischemic cardiomyopathy with EF of 40%, CKD 3, admitted on 9/8/2022.     Abdominal pain  Subacute arterial dissection of the SMA.  CTA showed 75% stenosis of proximal left renal artery, short segment of SMA stenosis, partially ulcerated atheromatous plaque proximal SMA and no evidence of bowel ischemia. As per vascular surgeon, findings are likely secondary to subacute arterial dissection.      Clear liquid diet as tolerated  Keep SBP < 120 and HR < 70  Continue Aspirin and plavix  Continue rosuvastatin 40 mg daily    Repeat CT scan in the next 24 hours as per vascular surgery service   Vascular surgery follow up- appreciate assistance     Essential hypertension  Keep SBP < 130 per vascular surgeon   Continue PTA lisinopril and Toprol-XL  Prn labetalol IV        Multivessel CAD ischemic cardiomyopathy, EF 40%.   Chest pain   Troponin negative X3.  NSR on EKG.  Telemetry  Continue PT Imdur, aspirin, lisinopril, Toprol-XL.     Non-insulin-dependent diabetes mellitus.    A1c 12.4 in June 2018; follow up repeat A1c  Hold PTA metformin and glipizide.   Insulin sliding scale   Running high, add lantus 10 units hs  Insulin carb coverage 1:15   Consider adding lantus if glucose is elevated     RENEA/Stage III CKD   Improved at 1.14 down from Creatinine 2.0 on 9/8/22 compared to 1.14 on 8/12/22  Possibly prerenal given relative hypotension vs cardiorenal process given ischemic cadriomyopathy  He received IV NS 1L bolus in the ER.   Monitor BMP  Avoid nephrotoxins      GERD  Continue pantoprazole      COPD  Not in exacerbation  Continue home inhalers.         Diet: Clear Liquid Diet    DVT Prophylaxis: Pneumatic Compression Devices  Muniz Catheter: Not present  Central Lines: None  Cardiac  "Monitoring: ACTIVE order. Indication: Chest pain/ ACS rule out (24 hours)  Code Status: Full Code      Disposition Plan      Expected Discharge Date: 09/12/2022, 12:00 PM    Destination: home;home with family          The patient's care was discussed with the Bedside Nurse and Patient.    José Antonio Chavez MD  Hospitalist Service  [facility]  Securely message with the Vocera Web Console (learn more here)  Text page via [employer] Paging/Directory         Clinically Significant Risk Factors Present on Admission               # Obesity: Estimated body mass index is 30.11 kg/m  as calculated from the following:    Height as of this encounter: 1.6 m (5' 3\").    Weight as of this encounter: 77.1 kg (170 lb).        ______________________________________________________________________    Interval History   Still having some abdominal pain.  Clear liquids do bother her some.    Data reviewed today: I reviewed all medications, new labs and imaging results over the last 24 hours.   Physical Exam   Vital Signs: Temp: 98  F (36.7  C) Temp src: Oral BP: 115/58 Pulse: 62   Resp: 20 SpO2: 94 % O2 Device: None (Room air)    Weight: 170 lbs 0 oz  General Appearance: Older F in NAD  Respiratory: occas rhonchi  Cardiovascular: RRR S1S2, no edema  GI: +BS, soft, tender more on right but somewhat throughout.  No rebound or guarding  Skin: no rashes or lesions on exposed areas  Neuro: Alert, generally nonfocal on motor and sensory testing      Data   Recent Labs   Lab 09/10/22  1630 09/10/22  1208 09/10/22  0855 09/10/22  0850 09/09/22  0820 09/09/22  0805 09/09/22  0022 09/08/22  1609   WBC  --   --   --  4.6  --  4.8  --  6.7   HGB  --   --   --  11.1*  --  11.1*  --  11.9   MCV  --   --   --  93  --  93  --  94   PLT  --   --   --  176  --  184  --  200   NA  --   --   --  138  --  138  --  137   POTASSIUM  --   --   --  4.2  --  3.8  --  4.2   CHLORIDE  --   --   --  108*  --  108*  --  103   CO2  --   --   --  23  " --  24  --  24   BUN  --   --   --  22  --  31*  --  40*   CR  --   --   --  1.14*  --  1.25*  --  2.00*   ANIONGAP  --   --   --  7  --  6  --  10   DEB  --   --   --  8.7  --  8.2*  --  9.0   * 249* 173* 172*   < > 133*   < > 258*   ALBUMIN  --   --   --   --   --  3.2*  --  3.8   PROTTOTAL  --   --   --   --   --  6.0  --  7.2   BILITOTAL  --   --   --   --   --  0.5  --  0.7   ALKPHOS  --   --   --   --   --  52  --  60   ALT  --   --   --   --   --  10  --  14   AST  --   --   --   --   --  12  --  13   LIPASE  --   --   --   --   --   --   --  <9    < > = values in this interval not displayed.     Medications       aspirin  81 mg Oral Daily     clopidogrel  75 mg Oral Daily     insulin aspart   Subcutaneous Daily with breakfast     insulin aspart   Subcutaneous Daily with lunch     insulin aspart   Subcutaneous Daily with supper     insulin aspart  1-3 Units Subcutaneous TID AC     insulin aspart  1-3 Units Subcutaneous At Bedtime     isosorbide mononitrate  60 mg Oral Daily     lisinopril  40 mg Oral Daily     metoprolol succinate ER  200 mg Oral Daily     pantoprazole  20 mg Oral Daily     polyethylene glycol  17 g Oral Daily     rosuvastatin  40 mg Oral Daily     sodium chloride (PF)  3 mL Intracatheter Q8H

## 2022-09-11 NOTE — PLAN OF CARE
Problem: Plan of Care - These are the overarching goals to be used throughout the patient stay.    Goal: Absence of Hospital-Acquired Illness or Injury  Outcome: Ongoing, Progressing  Intervention: Identify and Manage Fall Risk  Recent Flowsheet Documentation  Taken 9/11/2022 0412 by Mulu Garcia RN  Safety Promotion/Fall Prevention:   activity supervised   clutter free environment maintained   fall prevention program maintained   nonskid shoes/slippers when out of bed  Taken 9/11/2022 0008 by Mulu Garcia RN  Safety Promotion/Fall Prevention:   activity supervised   clutter free environment maintained  Intervention: Prevent Skin Injury  Recent Flowsheet Documentation  Taken 9/11/2022 0412 by Mulu Garcia RN  Body Position: position changed independently  Taken 9/11/2022 0008 by Mulu Garcia RN  Body Position: position changed independently  Intervention: Prevent and Manage VTE (Venous Thromboembolism) Risk  Recent Flowsheet Documentation  Taken 9/11/2022 0412 by Mulu Garcia RN  Activity Management: activity adjusted per tolerance  Taken 9/11/2022 0008 by Mulu Garcia RN  Activity Management:   activity adjusted per tolerance   ambulated to bathroom     Pt restful over noc. Acceptable pain control. BP monitoring/treating per md order. Falls precautions in place.

## 2022-09-12 ENCOUNTER — APPOINTMENT (OUTPATIENT)
Dept: CT IMAGING | Facility: HOSPITAL | Age: 72
DRG: 300 | End: 2022-09-12
Attending: HOSPITALIST
Payer: COMMERCIAL

## 2022-09-12 LAB
ANION GAP SERPL CALCULATED.3IONS-SCNC: 5 MMOL/L (ref 5–18)
BUN SERPL-MCNC: 17 MG/DL (ref 8–28)
CALCIUM SERPL-MCNC: 8.3 MG/DL (ref 8.5–10.5)
CHLORIDE BLD-SCNC: 108 MMOL/L (ref 98–107)
CO2 SERPL-SCNC: 23 MMOL/L (ref 22–31)
CREAT SERPL-MCNC: 1.24 MG/DL (ref 0.6–1.1)
GFR SERPL CREATININE-BSD FRML MDRD: 46 ML/MIN/1.73M2
GLUCOSE BLD-MCNC: 125 MG/DL (ref 70–125)
GLUCOSE BLDC GLUCOMTR-MCNC: 107 MG/DL (ref 70–99)
GLUCOSE BLDC GLUCOMTR-MCNC: 155 MG/DL (ref 70–99)
GLUCOSE BLDC GLUCOMTR-MCNC: 155 MG/DL (ref 70–99)
GLUCOSE BLDC GLUCOMTR-MCNC: 164 MG/DL (ref 70–99)
POTASSIUM BLD-SCNC: 4.2 MMOL/L (ref 3.5–5)
SODIUM SERPL-SCNC: 136 MMOL/L (ref 136–145)

## 2022-09-12 PROCEDURE — 74174 CTA ABD&PLVS W/CONTRAST: CPT

## 2022-09-12 PROCEDURE — 250N000013 HC RX MED GY IP 250 OP 250 PS 637: Performed by: INTERNAL MEDICINE

## 2022-09-12 PROCEDURE — 258N000003 HC RX IP 258 OP 636: Performed by: HOSPITALIST

## 2022-09-12 PROCEDURE — 210N000001 HC R&B IMCU HEART CARE

## 2022-09-12 PROCEDURE — 250N000012 HC RX MED GY IP 250 OP 636 PS 637: Performed by: INTERNAL MEDICINE

## 2022-09-12 PROCEDURE — 82310 ASSAY OF CALCIUM: CPT | Performed by: INTERNAL MEDICINE

## 2022-09-12 PROCEDURE — 250N000011 HC RX IP 250 OP 636: Performed by: HOSPITALIST

## 2022-09-12 PROCEDURE — 36415 COLL VENOUS BLD VENIPUNCTURE: CPT | Performed by: INTERNAL MEDICINE

## 2022-09-12 PROCEDURE — 250N000011 HC RX IP 250 OP 636: Performed by: INTERNAL MEDICINE

## 2022-09-12 PROCEDURE — 99233 SBSQ HOSP IP/OBS HIGH 50: CPT | Performed by: HOSPITALIST

## 2022-09-12 RX ORDER — IOPAMIDOL 755 MG/ML
100 INJECTION, SOLUTION INTRAVASCULAR ONCE
Status: COMPLETED | OUTPATIENT
Start: 2022-09-12 | End: 2022-09-12

## 2022-09-12 RX ORDER — SODIUM CHLORIDE 9 MG/ML
INJECTION, SOLUTION INTRAVENOUS CONTINUOUS
Status: ACTIVE | OUTPATIENT
Start: 2022-09-12 | End: 2022-09-12

## 2022-09-12 RX ADMIN — Medication 81 MG: at 10:42

## 2022-09-12 RX ADMIN — ACETAMINOPHEN 1000 MG: 500 TABLET ORAL at 10:42

## 2022-09-12 RX ADMIN — LISINOPRIL 40 MG: 20 TABLET ORAL at 10:42

## 2022-09-12 RX ADMIN — INSULIN ASPART 1 UNITS: 100 INJECTION, SOLUTION INTRAVENOUS; SUBCUTANEOUS at 18:52

## 2022-09-12 RX ADMIN — ROSUVASTATIN CALCIUM 40 MG: 40 TABLET, FILM COATED ORAL at 10:42

## 2022-09-12 RX ADMIN — PANTOPRAZOLE SODIUM 20 MG: 20 TABLET, DELAYED RELEASE ORAL at 10:42

## 2022-09-12 RX ADMIN — IOPAMIDOL 100 ML: 755 INJECTION, SOLUTION INTRAVENOUS at 18:13

## 2022-09-12 RX ADMIN — METOPROLOL SUCCINATE 200 MG: 100 TABLET, EXTENDED RELEASE ORAL at 10:49

## 2022-09-12 RX ADMIN — POLYETHYLENE GLYCOL 3350 17 G: 17 POWDER, FOR SOLUTION ORAL at 10:49

## 2022-09-12 RX ADMIN — HYDRALAZINE HYDROCHLORIDE 10 MG: 20 INJECTION, SOLUTION INTRAMUSCULAR; INTRAVENOUS at 15:10

## 2022-09-12 RX ADMIN — CLOPIDOGREL BISULFATE 75 MG: 75 TABLET ORAL at 10:49

## 2022-09-12 RX ADMIN — AMLODIPINE BESYLATE 5 MG: 5 TABLET ORAL at 21:53

## 2022-09-12 RX ADMIN — AMLODIPINE BESYLATE 5 MG: 5 TABLET ORAL at 00:03

## 2022-09-12 RX ADMIN — SODIUM CHLORIDE: 9 INJECTION, SOLUTION INTRAVENOUS at 18:54

## 2022-09-12 RX ADMIN — INSULIN ASPART 1 UNITS: 100 INJECTION, SOLUTION INTRAVENOUS; SUBCUTANEOUS at 12:35

## 2022-09-12 RX ADMIN — ISOSORBIDE MONONITRATE 60 MG: 60 TABLET, EXTENDED RELEASE ORAL at 10:42

## 2022-09-12 RX ADMIN — INSULIN ASPART 4 UNITS: 100 INJECTION, SOLUTION INTRAVENOUS; SUBCUTANEOUS at 12:35

## 2022-09-12 ASSESSMENT — ACTIVITIES OF DAILY LIVING (ADL)
ADLS_ACUITY_SCORE: 26

## 2022-09-12 NOTE — PLAN OF CARE
1300 to 1930   Problem: Cardiac Output Decreased (Pulmonary Hypertension)  Goal: Effective Cardiac Output  Outcome: Ongoing, Progressing  Intervention: Optimize Cardiac Output  Recent Flowsheet Documentation  Taken 9/11/2022 1600 by Gypsy Hendrickson, RN  Head of Bed (HOB) Positioning: HOB at 20-30 degrees   Goal Outcome Evaluation:           Pt's B/P 144/67  At 1530  Hydralazine 10 mg give around 1600.  B/p 132/59  rechecked at 1530 Called Dr. Chavez about b/p over Goal SBP<130.   No new orders for B/p 132/59 at this time.

## 2022-09-12 NOTE — PLAN OF CARE
Patient reported mild abdominal discomfort, 2-3/10 tolerable. Per MD orders she did receive the other 8 Units of Lantus insulin to equal HS dose of 18 Units. Blood pressure well controlled below goal for both BP and HR. Patient calls appropriately and is able to make needs known. Stand by assist to the bathroom. Tele NSR 1AVB. Potential for discharge today.       Vitals:    09/11/22 2221 09/11/22 2300 09/11/22 2353 09/12/22 0418   BP: (!) 143/64 112/56 113/53 118/58   BP Location:   Right arm Right arm   Pulse: 67 69 68 69   Resp:   18 18   Temp:   99  F (37.2  C) 98.8  F (37.1  C)   TempSrc:   Oral Oral   SpO2:   93% 93%   Weight:    78.8 kg (173 lb 11 oz)   Height:            Problem: Plan of Care - These are the overarching goals to be used throughout the patient stay.    Goal: Optimal Comfort and Wellbeing  Outcome: Ongoing, Progressing  Intervention: Monitor Pain and Promote Comfort  Recent Flowsheet Documentation  Taken 9/12/2022 0418 by Dayanna Rojas RN  Pain Management Interventions: (Tolerable per patient) other (see comments)  Taken 9/11/2022 2353 by Dayanna Rojas RN  Pain Management Interventions: (tolerble per patient) other (see comments)     Problem: Risk for Delirium  Goal: Improved Sleep  Outcome: Ongoing, Progressing     Problem: Pain Acute  Goal: Acceptable Pain Control and Functional Ability  Outcome: Ongoing, Progressing  Intervention: Develop Pain Management Plan  Recent Flowsheet Documentation  Taken 9/12/2022 0418 by Dayanna Rojas RN  Pain Management Interventions: (Tolerable per patient) other (see comments)  Taken 9/11/2022 2353 by Dayanna Rojas RN  Pain Management Interventions: (tolerble per patient) other (see comments)  Intervention: Prevent or Manage Pain  Recent Flowsheet Documentation  Taken 9/11/2022 2355 by Dayanna Rojas RN  Medication Review/Management: medications reviewed     Problem: Cardiac Output Decreased (Pulmonary Hypertension)  Goal: Effective  Cardiac Output  Outcome: Ongoing, Progressing   Goal Outcome Evaluation:

## 2022-09-12 NOTE — PROGRESS NOTES
SPIRITUAL HEALTH SERVICES Progress Note      Saw pt Bettina WONG Cesar per admission screening.    Illness Narrative - Bettina shared about her medical condition and hospital stay, particurlarly related to her cardiac challenges. She is hopeful that things will resolve so she doesn't need surgery.     Distress - Ongoing health challenges, otherwise no other distress noted at this time.     Coping - Bettina shared about her family support; she is  and has good relational support from her children/grandchildren. Patient comes from [place] background and derives meaning, purpose, and comfort from nicole. She has not been able to go to [employer] in recent months and stated that she likes to watch mass on tv when she is not able to go. Patient welcomed prayer and expressed appreciation for the visit.     Plan - A  will continue to visit as able or per request by patient/family/staff.      Franco Eubanks MDiv, [facility]  Lead Staff , [facility]  [phone]

## 2022-09-12 NOTE — PROGRESS NOTES
[facility]    Medicine Progress Note - Hospitalist Service    Date of Admission:  9/8/2022    Assessment & Plan          Bettina Guerrero is a 72 year old female with history DM, HTN, COPD, HLD, multivessel CAD and ischemic cardiomyopathy with EF of 40%, CKD 3, admitted on 9/8/2022 with abdominal pain and found to have subacute arterial dissection of SMA.   Pain (which she states was new compared to other pains she has had in the past) is slowly improving so will advance diet - discussed with vascular.  Repeat CTA 9/12 pending.       Abdominal pain  Possibly subacute arterial dissection of the SMA.  CTA showed 75% stenosis of proximal left renal artery, short segment of SMA stenosis, partially ulcerated atheromatous plaque proximal SMA and no evidence of bowel ischemia. As per vascular surgeon, findings are likely secondary to subacute arterial dissection.   Advance diet as tolerated  Keep SBP < 130 and HR < 70  Continue Aspirin and plavix  Continue rosuvastatin 40 mg daily    Repeat CT scan 9/12/22 per vascular surgery service      R renal artery stenosis     Incidental imaging findings:  Indeterminate R adrenal nodule and ovarian cyst - outpatient f/u    Essential hypertension  Keep SBP < 130 per vascular surgeon   Continue PTA lisinopril and Toprol-XL  Add norvasc to get BP below goal  Prn labetalol IV        Multivessel CAD ischemic cardiomyopathy, EF 40%.   Chest pain   Troponin negative X3.  NSR on EKG.  Continue PT Imdur, aspirin, lisinopril (hold after contrast ensure Cr stable), Toprol-XL.     Non-insulin-dependent diabetes mellitus.    A1c 12.4 in June 2018; repeat A1c on admit was 10.0  Hold PTA metformin and glipizide. on discharge would probably add januvia or SGLT2I given poor control  Insulin sliding scale   Has been running high, so added lantus - will increase to 16 units hs  Insulin carb coverage 1:15        RENEA/Stage III CKD   Improved at 1.14 down from Creatinine 2.0 on  "9/8/22 compared to 1.14 on 8/12/22  Probably was pre-renal  Back to baseline now.       GERD  Continue pantoprazole      COPD  Not in exacerbation  Continue home inhalers.    Hyponatremia -resolved         Diet: Combination Diet Moderate Consistent Carb (60 g CHO per Meal) Diet    DVT Prophylaxis: Pneumatic Compression Devices  Muniz Catheter: Not present  Central Lines: None  Cardiac Monitoring: None  Code Status: Full Code      Disposition Plan      Expected Discharge Date: 09/12/2022,  6:00 PM    Destination: home;home with family          The patient's care was discussed with the Bedside Nurse and Patient.    Erwin Shaffer MD  Hospitalist Service  [facility]  Securely message with the Vocera Web Console (learn more here)  Text page via [employer] Paging/Directory         Clinically Significant Risk Factors Present on Admission               # Obesity: Estimated body mass index is 30.77 kg/m  as calculated from the following:    Height as of this encounter: 1.6 m (5' 3\").    Weight as of this encounter: 78.8 kg (173 lb 11 oz).        ______________________________________________________________________    Interval History     Still having some abdominal pain but slowly improving compared to on admission.  Again reports this upper abdominal pain is totally new for her since 09/08.      Data reviewed today: I reviewed all medications, new labs and imaging results over the last 24 hours.   Physical Exam   Vital Signs: Temp: 98.2  F (36.8  C) Temp src: Oral BP: 108/52 Pulse: 58   Resp: 18 SpO2: 97 % O2 Device: None (Room air)    Weight: 173 lbs 11 oz  General Appearance: Older F in NAD  Respiratory: CTA  Cardiovascular: RRR nl S1S2  GI: soft, mild epigastric TTP,   Skin: no rashes or lesions on exposed areas  Neuro: Alert, fluent speech      Data   Recent Labs   Lab 09/12/22  1658 09/12/22  1154 09/12/22  0847 09/12/22  0456 09/11/22  1206 09/11/22  1133 09/10/22  0855 09/10/22  0850 " 09/09/22  0820 09/09/22  0805 09/09/22  0022 09/08/22  1609   WBC  --   --   --   --   --   --   --  4.6  --  4.8  --  6.7   HGB  --   --   --   --   --   --   --  11.1*  --  11.1*  --  11.9   MCV  --   --   --   --   --   --   --  93  --  93  --  94   PLT  --   --   --   --   --   --   --  176  --  184  --  200   NA  --   --   --  136  --  134*  --  138  --  138  --  137   POTASSIUM  --   --   --  4.2  --  5.0  --  4.2  --  3.8  --  4.2   CHLORIDE  --   --   --  108*  --  106  --  108*  --  108*  --  103   CO2  --   --   --  23  --  22  --  23  --  24  --  24   BUN  --   --   --  17  --  14  --  22  --  31*  --  40*   CR  --   --   --  1.24*  --  1.14*  --  1.14*  --  1.25*  --  2.00*   ANIONGAP  --   --   --  5  --  6  --  7  --  6  --  10   DEB  --   --   --  8.3*  --  8.8  --  8.7  --  8.2*  --  9.0   * 155* 107* 125   < > 277*   < > 172*   < > 133*   < > 258*   ALBUMIN  --   --   --   --   --   --   --   --   --  3.2*  --  3.8   PROTTOTAL  --   --   --   --   --   --   --   --   --  6.0  --  7.2   BILITOTAL  --   --   --   --   --   --   --   --   --  0.5  --  0.7   ALKPHOS  --   --   --   --   --   --   --   --   --  52  --  60   ALT  --   --   --   --   --   --   --   --   --  10  --  14   AST  --   --   --   --   --   --   --   --   --  12  --  13   LIPASE  --   --   --   --   --   --   --   --   --   --   --  <9    < > = values in this interval not displayed.     Medications       amLODIPine  5 mg Oral QPM     aspirin  81 mg Oral Daily     clopidogrel  75 mg Oral Daily     insulin aspart   Subcutaneous Daily with breakfast     insulin aspart   Subcutaneous Daily with lunch     insulin aspart   Subcutaneous Daily with supper     insulin aspart  1-3 Units Subcutaneous TID AC     insulin aspart  1-3 Units Subcutaneous At Bedtime     insulin glargine  18 Units Subcutaneous At Bedtime     isosorbide mononitrate  60 mg Oral Daily     lisinopril  40 mg Oral Daily     metoprolol succinate ER  200 mg Oral  Daily     pantoprazole  20 mg Oral Daily     polyethylene glycol  17 g Oral Daily     rosuvastatin  40 mg Oral Daily     sodium chloride (PF)  3 mL Intracatheter Q8H

## 2022-09-12 NOTE — PLAN OF CARE
Problem: Plan of Care - These are the overarching goals to be used throughout the patient stay.    Goal: Plan of Care Review/Shift Note  Description: The Plan of Care Review/Shift note should be completed every shift.  The Outcome Evaluation is a brief statement about your assessment that the patient is improving, declining, or no change.  This information will be displayed automatically on your shift note.  Outcome: Ongoing, Progressing   Goal Outcome Evaluation:        Pt is alert and oriented x 3, Mild pain on upper abdomen rated 3/10.that is tolerable. Standby assist to assist of with cane. Pt/s BP was 138/67. Dr Ortiz was notified because pt has an order to keep  SBP<120 at all times. He requested a BP recheck. And it was 146/64. Pt had 10 mg of hydralazine PRN. Will recheck BP in 30 minutes.

## 2022-09-13 LAB
GLUCOSE BLDC GLUCOMTR-MCNC: 162 MG/DL (ref 70–99)
GLUCOSE BLDC GLUCOMTR-MCNC: 173 MG/DL (ref 70–99)
GLUCOSE BLDC GLUCOMTR-MCNC: 92 MG/DL (ref 70–99)
GLUCOSE BLDC GLUCOMTR-MCNC: 98 MG/DL (ref 70–99)

## 2022-09-13 PROCEDURE — 250N000013 HC RX MED GY IP 250 OP 250 PS 637: Performed by: INTERNAL MEDICINE

## 2022-09-13 PROCEDURE — 250N000009 HC RX 250: Performed by: INTERNAL MEDICINE

## 2022-09-13 PROCEDURE — 250N000011 HC RX IP 250 OP 636: Performed by: INTERNAL MEDICINE

## 2022-09-13 PROCEDURE — 210N000001 HC R&B IMCU HEART CARE

## 2022-09-13 PROCEDURE — 99232 SBSQ HOSP IP/OBS MODERATE 35: CPT | Performed by: INTERNAL MEDICINE

## 2022-09-13 RX ORDER — ACETAMINOPHEN 325 MG/1
975 TABLET ORAL EVERY 6 HOURS
Status: DISCONTINUED | OUTPATIENT
Start: 2022-09-13 | End: 2022-09-17 | Stop reason: HOSPADM

## 2022-09-13 RX ORDER — PANTOPRAZOLE SODIUM 20 MG/1
20 TABLET, DELAYED RELEASE ORAL
Status: DISCONTINUED | OUTPATIENT
Start: 2022-09-13 | End: 2022-09-17 | Stop reason: HOSPADM

## 2022-09-13 RX ORDER — LIDOCAINE 50 MG/G
OINTMENT TOPICAL 4 TIMES DAILY
Status: DISCONTINUED | OUTPATIENT
Start: 2022-09-13 | End: 2022-09-17 | Stop reason: HOSPADM

## 2022-09-13 RX ADMIN — CLOPIDOGREL BISULFATE 75 MG: 75 TABLET ORAL at 10:32

## 2022-09-13 RX ADMIN — ROSUVASTATIN CALCIUM 40 MG: 40 TABLET, FILM COATED ORAL at 08:38

## 2022-09-13 RX ADMIN — POLYETHYLENE GLYCOL 3350 17 G: 17 POWDER, FOR SOLUTION ORAL at 08:38

## 2022-09-13 RX ADMIN — ISOSORBIDE MONONITRATE 60 MG: 60 TABLET, EXTENDED RELEASE ORAL at 08:38

## 2022-09-13 RX ADMIN — Medication 81 MG: at 08:38

## 2022-09-13 RX ADMIN — INSULIN ASPART 4 UNITS: 100 INJECTION, SOLUTION INTRAVENOUS; SUBCUTANEOUS at 12:39

## 2022-09-13 RX ADMIN — AMLODIPINE BESYLATE 5 MG: 5 TABLET ORAL at 21:47

## 2022-09-13 RX ADMIN — HYDRALAZINE HYDROCHLORIDE 10 MG: 20 INJECTION, SOLUTION INTRAMUSCULAR; INTRAVENOUS at 05:15

## 2022-09-13 RX ADMIN — ACETAMINOPHEN 1000 MG: 500 TABLET ORAL at 10:05

## 2022-09-13 RX ADMIN — PANTOPRAZOLE SODIUM 20 MG: 20 TABLET, DELAYED RELEASE ORAL at 16:20

## 2022-09-13 RX ADMIN — PANTOPRAZOLE SODIUM 20 MG: 20 TABLET, DELAYED RELEASE ORAL at 08:38

## 2022-09-13 RX ADMIN — INSULIN ASPART 1 UNITS: 100 INJECTION, SOLUTION INTRAVENOUS; SUBCUTANEOUS at 18:19

## 2022-09-13 RX ADMIN — METOPROLOL SUCCINATE 200 MG: 100 TABLET, EXTENDED RELEASE ORAL at 08:38

## 2022-09-13 RX ADMIN — HYDRALAZINE HYDROCHLORIDE 10 MG: 20 INJECTION, SOLUTION INTRAMUSCULAR; INTRAVENOUS at 10:32

## 2022-09-13 RX ADMIN — LIDOCAINE: 50 OINTMENT TOPICAL at 18:04

## 2022-09-13 RX ADMIN — ACETAMINOPHEN 975 MG: 325 TABLET, FILM COATED ORAL at 18:04

## 2022-09-13 RX ADMIN — LIDOCAINE: 50 OINTMENT TOPICAL at 21:49

## 2022-09-13 ASSESSMENT — ACTIVITIES OF DAILY LIVING (ADL)
ADLS_ACUITY_SCORE: 26
ADLS_ACUITY_SCORE: 30
ADLS_ACUITY_SCORE: 26
ADLS_ACUITY_SCORE: 30
ADLS_ACUITY_SCORE: 26

## 2022-09-13 NOTE — PLAN OF CARE
"  Problem: Plan of Care - These are the overarching goals to be used throughout the patient stay.    Goal: Plan of Care Review/Shift Note  Description: The Plan of Care Review/Shift note should be completed every shift.  The Outcome Evaluation is a brief statement about your assessment that the patient is improving, declining, or no change.  This information will be displayed automatically on your shift note.  Outcome: Ongoing, Progressing  Goal: Patient-Specific Goal (Individualized)  Description: You can add care plan individualizations to a care plan. Examples of Individualization might be:  \"Parent requests to be called daily at 9am for status\", \"I have a hard time hearing out of my right ear\", or \"Do not touch me to wake me up as it startles me\".  Outcome: Ongoing, Progressing  Goal: Absence of Hospital-Acquired Illness or Injury  Outcome: Ongoing, Progressing  Intervention: Identify and Manage Fall Risk  Recent Flowsheet Documentation  Taken 9/12/2022 1627 by Raquel Valles, RN  Safety Promotion/Fall Prevention:   activity supervised   fall prevention program maintained   increased rounding and observation   clutter free environment maintained   nonskid shoes/slippers when out of bed   toileting scheduled  Taken 9/12/2022 1300 by Raquel Valles, RN  Safety Promotion/Fall Prevention:   activity supervised   fall prevention program maintained   increased rounding and observation   clutter free environment maintained   nonskid shoes/slippers when out of bed   toileting scheduled  Taken 9/12/2022 1048 by Raquel Valles, RN  Safety Promotion/Fall Prevention:   activity supervised   fall prevention program maintained   increased rounding and observation   clutter free environment maintained   nonskid shoes/slippers when out of bed   toileting scheduled  Intervention: Prevent Skin Injury  Recent Flowsheet Documentation  Taken 9/12/2022 1627 by Raquel Valles, RN  Body Position: position changed " independently  Taken 9/12/2022 1300 by Raquel Valles RN  Body Position: position changed independently  Taken 9/12/2022 1048 by Raquel Valles RN  Body Position: position changed independently  Intervention: Prevent and Manage VTE (Venous Thromboembolism) Risk  Recent Flowsheet Documentation  Taken 9/12/2022 1627 by Raquel Valles RN  VTE Prevention/Management: (on ASA) other (see comments)  Activity Management: activity adjusted per tolerance  Taken 9/12/2022 1300 by Raquel Valles RN  VTE Prevention/Management: (on ASA) other (see comments)  Activity Management: activity adjusted per tolerance  Taken 9/12/2022 1048 by Raquel Valles RN  VTE Prevention/Management: (on ASA) other (see comments)  Activity Management:   activity adjusted per tolerance   ambulated to bathroom  Goal: Optimal Comfort and Wellbeing  Outcome: Ongoing, Progressing  Intervention: Monitor Pain and Promote Comfort  Recent Flowsheet Documentation  Taken 9/12/2022 1627 by Raquel Valles RN  Pain Management Interventions:   care clustered   medication (see MAR)  Taken 9/12/2022 1300 by Raquel Valles RN  Pain Management Interventions:   care clustered   medication (see MAR)  Taken 9/12/2022 1048 by Rqauel Valles RN  Pain Management Interventions:   care clustered   medication (see MAR)  Goal: Readiness for Transition of Care  Outcome: Ongoing, Progressing     Problem: Risk for Delirium  Goal: Optimal Coping  Outcome: Ongoing, Progressing  Goal: Improved Behavioral Control  Outcome: Ongoing, Progressing  Goal: Improved Attention and Thought Clarity  Outcome: Ongoing, Progressing  Intervention: Maximize Cognitive Function  Recent Flowsheet Documentation  Taken 9/12/2022 1627 by Raquel Valles RN  Sensory Stimulation Regulation: care clustered  Reorientation Measures: glasses use encouraged  Taken 9/12/2022 1300 by Raquel Valles RN  Sensory Stimulation Regulation: care clustered  Reorientation Measures: glasses use  encouraged  Taken 9/12/2022 1048 by Raquel Valles RN  Sensory Stimulation Regulation: care clustered  Reorientation Measures: glasses use encouraged  Goal: Improved Sleep  Outcome: Ongoing, Progressing     Problem: Pain Acute  Goal: Acceptable Pain Control and Functional Ability  Outcome: Ongoing, Progressing  Intervention: Develop Pain Management Plan  Recent Flowsheet Documentation  Taken 9/12/2022 1627 by Raquel Valles RN  Pain Management Interventions:   care clustered   medication (see MAR)  Taken 9/12/2022 1300 by aRquel Valles RN  Pain Management Interventions:   care clustered   medication (see MAR)  Taken 9/12/2022 1048 by Raquel Valles RN  Pain Management Interventions:   care clustered   medication (see MAR)  Intervention: Prevent or Manage Pain  Recent Flowsheet Documentation  Taken 9/12/2022 1627 by Raquel Valles RN  Sensory Stimulation Regulation: care clustered  Medication Review/Management: medications reviewed  Taken 9/12/2022 1300 by Raquel Valles RN  Sensory Stimulation Regulation: care clustered  Medication Review/Management: medications reviewed  Taken 9/12/2022 1048 by Raquel Valles RN  Sensory Stimulation Regulation: care clustered  Medication Review/Management: medications reviewed     Problem: Cardiac Output Decreased (Pulmonary Hypertension)  Goal: Effective Cardiac Output  Outcome: Ongoing, Progressing  Intervention: Optimize Cardiac Output  Recent Flowsheet Documentation  Taken 9/12/2022 1627 by Raquel Valles RN  Head of Bed (HOB) Positioning: HOB at 20-30 degrees  Taken 9/12/2022 1300 by Raquel Valles RN  Head of Bed (HOB) Positioning: HOB at 20-30 degrees  Taken 9/12/2022 1048 by Raquel Valles RN  Head of Bed (HOB) Positioning: HOB at 20-30 degrees   Goal Outcome Evaluation:

## 2022-09-13 NOTE — PROGRESS NOTES
"VASCULAR SURGERY PROGRESS NOTE    Subjective:  No acute events overnight.  CTA abdomen repeated yesterday with stable appearance of SMA.  Continued epigastric pain noted that occurred prior to breakfast this morning and was relieved with pain medication.  Described as pressure over upper abdomen radiating to back but without nausea or vomiting.  Tolerating regular diet and states she ate an omelette for breakfast.    Objective:  Intake/Output Summary (Last 24 hours) at 9/13/2022 1501  Last data filed at 9/13/2022 1006  Gross per 24 hour   Intake 596 ml   Output 600 ml   Net -4 ml     PHYSICAL EXAM:  /59 (BP Location: Right arm)   Pulse 63   Temp 98  F (36.7  C) (Oral)   Resp 20   Ht 1.6 m (5' 3\")   Wt 80.2 kg (176 lb 12.8 oz)   SpO2 94%   BMI 31.32 kg/m    General: The patient is alert and oriented. Appropriate. No acute distress  Psych: pleasant affect, answers questions appropriately  Skin: Color appropriate for race, warm, dry.  Respiratory: The patient does not require supplemental oxygen. Breathing unlabored  GI:  Abdomen soft, moderately tender over epigastric region without rebound tenderness guarding or rigidity.  Nondistended.  Extremities: 2+ radial and DP bilaterally      Imaging:   CTA Chest Abdomen Pelvis w Contrast    Result Date: 9/12/2022  EXAM: CTA CHEST ABDOMEN PELVIS W CONTRAST LOCATION: [facility] DATE/TIME: 9/12/2022 6:13 PM INDICATION: Reevaluate SMA dissection COMPARISON: CTA abdomen and pelvis 09/08/2022 TECHNIQUE: CT angiogram chest abdomen pelvis during arterial phase of injection of IV contrast. 2D and 3D MIP reconstructions were performed by the CT technologist. Dose reduction techniques were used. CONTRAST: 100ml isovue 370 FINDINGS: CT ANGIOGRAM CHEST, ABDOMEN, AND PELVIS: Due to technical factors arterial phase imaging was not obtained, and only delayed imaging was performed. Advanced atherosclerotic disease of the aortic arch. No evidence for " thoracic aortic dissection or aneurysm. A small focus of dissection or intraluminal thrombus again identified within the proximal superior mesenteric artery on images 133 through 137 of  5 series 5, similar in appearance to the previous exam. The superior mesenteric artery distally is patent. Atherosclerotic disease abdominal aorta and iliac arteries. No evidence for aneurysm. Minimal plaque at the origin of the celiac trunk, without significant narrowing. The inferior mesenteric artery is patent. Plaque and moderate to high-grade stenosis of the left renal artery, suboptimally opacified. Incidental retroaortic left renal vein. LUNGS AND PLEURA: Curvilinear scarring or atelectasis both lower lobes, unchanged. No new infiltrates or pleural effusions. MEDIASTINUM/AXILLAE: No enlarged mediastinal or hilar nodes. Borderline cardiomegaly. CORONARY ARTERY CALCIFICATION: Severe. Stent in the LAD. HEPATOBILIARY: Cholecystectomy, likely accounting for dilatation of the common bile duct, unchanged. PANCREAS: Normal. SPLEEN: Normal. ADRENAL GLANDS: Enhancing 2.8 x 2.2 cm right adrenal mass is unchanged. KIDNEYS/BLADDER: No renal calculi or hydronephrosis. Tiny cyst right kidney. Small amount of air in the bladder. BOWEL: Marked distention of the stomach with ingested material. No evidence for bowel obstruction. Scattered diverticula throughout the colon, without evidence for diverticulitis. LYMPH NODES: Normal. PELVIC ORGANS: 3.3 x 2.5 cm right adnexal cyst is unchanged. MUSCULOSKELETAL: Mild hypertrophic changes of spine.     IMPRESSION: 1.  No significant change in the appearance of the nonocclusive intraluminal thrombus or small dissection proximal superior mesenteric artery, suboptimally opacified due to technical issues. 2.  Indeterminate, enhancing right adrenal mass is unchanged. Considerations would include a lipid poor adenoma versus other neoplasm. Recommend follow-up noncontrast CT exam in 3 months for reevaluation.  3.  Stable right adnexal cyst. Please see guidelines described on previous report for further evaluation. 4.  Gas within the bladder presumed iatrogenic. Clinical correlation recommended.     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      PLAN:  No acute vascular intervention indicated  Recommend evaluation by GI for consideration of possible EGD.  Patient with complaints of reflux and epigastric pain which is localized to subxiphoid region, very unlikely related to SMA dissection.  No clear indication for antiplatelet at this time.  Okay to hold ASA and Plavix from vascular surgery perspective.  Follow-up with vascular surgery as needed, all other care per primary team.    Discussed pt history, exam, assessment and plan with Dr. Merritt of the vascular surgery service, who is in agreement with the above.    Brunilda Ballesteros, DO  VASCULAR SURGERY

## 2022-09-13 NOTE — PROGRESS NOTES
[facility] ACUTE PAIN SERVICE CONSULTATION     Date of Admission:  9/8/2022  Date of Consult (When I saw the patient): 09/13/22  Physician requesting consult: Dr. Dorado  Reason for consult: uncontrolled pain  Primary Care Physician: Merlene VALDEZ     Assessment/Plan:     Bettina Guerrero is a 72 year old female who was admitted on 9/8/2022.  Pain team was asked to see the patient for uncontrolled pain with SMA subacute dissection. . Admitted for syncope, and reported diarrhea x 2 days, abdominal pain and nausea, shortness of breath and pain between her shoulder blades. Patient reporting abdominal pain x 4 years. History including CKD3 , COPD, HLD, HTN, NIDDM, CAD and ischemic cardiomyopathy. Vascular Surgery consulted, per note CTA showing abnormal low density filling defects sen in the proximal aspect of the SMA, no surgical intervention indicated. Diet is advance diet.  The patient does not smoke and denies chemical dependency history.     Opioid Induced Respiratory Depression Risk Assessment:High, age, COPD, CKD,  Cardiomyopathy.?    Noted allergies/intolerance-oxycodone-itching, tramadol-diarrhea     Pt reporting lower rib cage pain, which wraps around to back, described as tight, and if something is sitting on her chest, as well as feeling if her rib cages are switching sides. Pt reporting tylenol is decreasing pain from 7-810 to a 5/10 which is tolerable for her. Will schedule APAP and topical lidocaine ointment for now and re-assess pain tomorrow. Has allergies/intolerances to oxycodone/tramadol so if an opioid is tried could consider hydrocodone or hydromorphone at low dose. However at this time will maximize multimodal therapy.     PLAN:   1) Pain is consistent with abddominal pain that has been present for last 4 years and per vascular surgery Low likelihood that her abdominal pain that has been present for last 4 years is related to short segment non flow limiting dissection of SMA  2)Multimodal  Medication Therapy  Topical: trial lidocaine ointment QID to chest/abdomen/back  NSAID'S: CrCl 42 ml/min. No NSAIDs due to renal function and cardiomyopathy.  Muscle Relaxants: none  Adjuvants:  Tylenol 500-1000 mg q 6 h prn - schedule 975 mg po QID, NTG s/l prn for chest pain   Antidepressants/anxiolytics: none, melatonin prn sleep  Opioids: none  IV Pain medication: none  3)Non-medication interventions: per nursing  Acupuncture consult - if patient agreeable  Integrative consult - if patient agreeable  4)Constipation Prophylaxis:  Miralax dailly  5) Care Teams: [facility], Vascular Surgery  -Opioid prescriber has been none  -[place]  pulled from system on 09/13/22. This indicates patient not prescribed opioids in time period of last year.   Discharge Recommendations - We recommend prescribing the following at the time of discharge: TBD         History of Present Illness (HPI):       Bettina Guerrero is a 72 year old old female who presented for syncope, nausea acute on chronic abdominal pain. Past medical history as above. Patient with chronic hip pain, left leg pain and left knee pain, lumbar back pain.     Per [place]  review, the patient does not have an opioid tolerance.     Reviewed medical record, labs, imaging, ED note, and care everywhere.     Past pain treatments have included: Pain Clinic-no , epidural steroid injection-yes , PT    Home pain medications/psych medications/anticoagulation medications include:  Tylenol, naproxen in the past.    Imaging:   CTA Chest Abdomen Pelvis w Contrast    Result Date: 9/12/2022  EXAM: CTA CHEST ABDOMEN PELVIS W CONTRAST LOCATION: [facility] DATE/TIME: 9/12/2022 6:13 PM INDICATION: Reevaluate SMA dissection COMPARISON: CTA abdomen and pelvis 09/08/2022 TECHNIQUE: CT angiogram chest abdomen pelvis during arterial phase of injection of IV contrast. 2D and 3D MIP reconstructions were performed by the CT technologist. Dose reduction techniques were used.  CONTRAST: 100ml isovue 370 FINDINGS: CT ANGIOGRAM CHEST, ABDOMEN, AND PELVIS: Due to technical factors arterial phase imaging was not obtained, and only delayed imaging was performed. Advanced atherosclerotic disease of the aortic arch. No evidence for thoracic aortic dissection or aneurysm. A small focus of dissection or intraluminal thrombus again identified within the proximal superior mesenteric artery on images 133 through 137 of  5 series 5, similar in appearance to the previous exam. The superior mesenteric artery distally is patent. Atherosclerotic disease abdominal aorta and iliac arteries. No evidence for aneurysm. Minimal plaque at the origin of the celiac trunk, without significant narrowing. The inferior mesenteric artery is patent. Plaque and moderate to high-grade stenosis of the left renal artery, suboptimally opacified. Incidental retroaortic left renal vein. LUNGS AND PLEURA: Curvilinear scarring or atelectasis both lower lobes, unchanged. No new infiltrates or pleural effusions. MEDIASTINUM/AXILLAE: No enlarged mediastinal or hilar nodes. Borderline cardiomegaly. CORONARY ARTERY CALCIFICATION: Severe. Stent in the LAD. HEPATOBILIARY: Cholecystectomy, likely accounting for dilatation of the common bile duct, unchanged. PANCREAS: Normal. SPLEEN: Normal. ADRENAL GLANDS: Enhancing 2.8 x 2.2 cm right adrenal mass is unchanged. KIDNEYS/BLADDER: No renal calculi or hydronephrosis. Tiny cyst right kidney. Small amount of air in the bladder. BOWEL: Marked distention of the stomach with ingested material. No evidence for bowel obstruction. Scattered diverticula throughout the colon, without evidence for diverticulitis. LYMPH NODES: Normal. PELVIC ORGANS: 3.3 x 2.5 cm right adnexal cyst is unchanged. MUSCULOSKELETAL: Mild hypertrophic changes of spine.     IMPRESSION: 1.  No significant change in the appearance of the nonocclusive intraluminal thrombus or small dissection proximal superior mesenteric artery,  suboptimally opacified due to technical issues. 2.  Indeterminate, enhancing right adrenal mass is unchanged. Considerations would include a lipid poor adenoma versus other neoplasm. Recommend follow-up noncontrast CT exam in 3 months for reevaluation. 3.  Stable right adnexal cyst. Please see guidelines described on previous report for further evaluation. 4.  Gas within the bladder presumed iatrogenic. Clinical correlation recommended.     CTA Chest Abdomen Pelvis w Contrast    Result Date: 9/8/2022  EXAM: CTA CHEST ABDOMEN PELVIS W CONTRAST LOCATION: [facility] DATE/TIME: 9/8/2022 6:50 PM INDICATION: Shortness of breath, abdominal pain, shoulder blade pain. COMPARISON: None. TECHNIQUE: CT angiogram chest abdomen pelvis during arterial phase of injection of IV contrast. 2D and 3D MIP reconstructions were performed by the CT technologist. Dose reduction techniques were used. CONTRAST: 75 mL Isovue 370 FINDINGS: CT ANGIOGRAM CHEST, ABDOMEN, AND PELVIS: There is an abnormal low density filling defect seen in the proximal aspect of the SMA just distal to the origin and to the left of midline measuring approximately 1.0 cm x 0.5 cm x 0.3 cm and findings could represent a partially ulcerated atheromatous plaque, a localized area of thrombus, or less likely a localized dissection. The SMA is otherwise normal in appearance to include the distal branches. There is mild scattered vascular calcification seen most marked in the iliac arteries and the abdominal aorta. There is approximately 75% stenosis seen involving the origin of the left renal artery; however, no significant parenchymal atrophy is seen. There is good flow extending into both groins. The thoracic  aorta, abdominal aorta, great vessels arising from the abdominal aorta, and the iliac arteries are otherwise normal. LUNGS AND PLEURA: Mild mosaic perfusion which is nonspecific, but most typical for small airway inflammation with underlying  air trapping. Tiny, benign lymph node associated with the minor fissure. Minimal dependent atelectasis. MEDIASTINUM/AXILLAE: Normal. CORONARY ARTERY CALCIFICATION: There appears to be a stent seen in the LAD with moderate to marked calcification of the coronary arteries. HEPATOBILIARY: The gallbladder surgically absent. Normal variant reservoir effect on the common bile duct. The liver is normal. PANCREAS: Normal. SPLEEN: Normal. ADRENAL GLANDS: There is an indeterminant 2.6 cm x 2.2 cm lesion in the right adrenal gland. The left adrenal gland is normal. KIDNEYS/BLADDER: Minute benign cyst in the right kidney and no follow-up is recommended. Presumed mild iatrogenic gas within the lumen of the bladder. The kidneys, ureters, and bladder are otherwise normal. BOWEL: Mild findings of diverticulosis with no evidence for diverticulitis. The bowel is otherwise normal. LYMPH NODES: Normal. PELVIC ORGANS: Simple 2.9 cm x 2.6 cm cyst right ovary. The pelvic organs are otherwise normal. MUSCULOSKELETAL: Moderate to advanced multilevel degenerative changes of the spine most pronounced in the lumbar facet joints.     IMPRESSION: 1.  There is an abnormal low density filling defects seen in the proximal aspect of the SMA just distal to the origin and to the left of midline measuring approximately 1.0 cm x 0.5 cm x 0.3 cm and findings could represent a partially ulcerated atheromatous plaque, a localized area of thrombus, or less likely a localized dissection. The SMA is otherwise normal in appearance to include the distal branches. 2.  No evidence for bowel ischemia. 3.  Approximately 75% stenosis origin of the left renal artery. 4.  2.9 x 2.6 cm cyst right ovary, please refer to below reference for possible follow-up. REFERENCE: Management of Incidental Adnexal Findings on CT and MRI: A White Paper of the ACR Incidental Findings Committee. J Am Halie Radiol 2020; 17(2):248-254. Postmenopausal or equal to or >50 years if status  unknown: Equal to or <3 cm: No further imaging. >3 cm on CT: Ultrasound. Equal to or <5 cm on MRI: No follow-up if fully characterized. >3 cm on MRI: Ultrasound in 6-12 months if not fully characterized. >5 cm on MRI: Ultrasound in 6-12 months even if fully characterized. [Critical Result: Abnormal proximal SMA which could represent an ulcerative atheromatous plaque, small area of thrombus, or less likely localized dissection.] Finding was identified on 9/8/2022 7:15PM. Dr. Eduardo was contacted by me on 9/8/2022 7:25 PM and verbalized understanding of the critical result.     Cervical spine CT w/o contrast    Result Date: 9/8/2022  EXAM: CT CERVICAL SPINE W/O CONTRAST LOCATION: [facility] DATE/TIME: 9/8/2022 6:49 PM INDICATION: fall, neck pain COMPARISON: None. TECHNIQUE: Routine CT Cervical Spine without IV contrast. Multiplanar reformats. Dose reduction techniques were used. FINDINGS: VERTEBRA: Mild reversal of the normal cervical lordosis. Otherwise unremarkable vertebral body heights and alignment. No fracture or posttraumatic subluxation. CANAL/FORAMINA: No canal or neural foraminal stenosis. PARASPINAL: No extraspinal abnormality.     IMPRESSION: 1.  No fracture or posttraumatic subluxation. 2.  No high-grade spinal canal or neural foraminal stenosis.    Head CT w/o contrast    Result Date: 9/8/2022  EXAM: CT HEAD W/O CONTRAST LOCATION: [facility] DATE/TIME: 9/8/2022 6:49 PM INDICATION: fall, dizzy COMPARISON: MRI brain dated 10/11/2018. TECHNIQUE: Routine CT Head without IV contrast. Multiplanar reformats. Dose reduction techniques were used. FINDINGS: INTRACRANIAL CONTENTS: No intracranial hemorrhage, extraaxial collection, or mass effect.  No CT evidence of acute infarct. Small chronic left parietal infarct. Chronic right lateral basal ganglia lacunar infarct. Mild presumed chronic small vessel ischemic changes. Mild generalized volume loss. No hydrocephalus.  VISUALIZED ORBITS/SINUSES/MASTOIDS: No intraorbital abnormality. No paranasal sinus mucosal disease. No middle ear or mastoid effusion. BONES/SOFT TISSUES: No acute abnormality.     IMPRESSION: 1.  No acute intracranial abnormalities identified.    CT Thoracic Spine w/o Contrast    Result Date: 9/8/2022  EXAM: CT THORACIC SPINE W/O CONTRAST LOCATION: [facility] DATE/TIME: 9/8/2022 6:50 PM INDICATION: fall COMPARISON: None. TECHNIQUE: Routine CT Thoracic Spine without IV contrast. Multiplanar reformats. Dose reduction techniques were used. FINDINGS: VERTEBRA: Normal vertebral body heights and alignment. No fracture or posttraumatic subluxation. CANAL/FORAMINA: No high-grade spinal canal or neural foraminal stenosis. PARASPINAL: No extraspinal abnormality.     IMPRESSION: 1.  No fracture or posttraumatic subluxation. 2.  No high-grade spinal canal or neural foraminal stenosis.       Suzanne Cardoza, PharmD, BCPS, CPE  Acute Care Pain Management Program  [facility] ([place], [place], [place])  Page via online paging system or call [phone]

## 2022-09-14 ENCOUNTER — APPOINTMENT (OUTPATIENT)
Dept: CARDIOLOGY | Facility: HOSPITAL | Age: 72
DRG: 300 | End: 2022-09-14
Attending: INTERNAL MEDICINE
Payer: COMMERCIAL

## 2022-09-14 LAB
ATRIAL RATE - MUSE: 67 BPM
BNP SERPL-MCNC: 260 PG/ML (ref 0–123)
DIASTOLIC BLOOD PRESSURE - MUSE: NORMAL MMHG
GLUCOSE BLDC GLUCOMTR-MCNC: 117 MG/DL (ref 70–99)
GLUCOSE BLDC GLUCOMTR-MCNC: 171 MG/DL (ref 70–99)
GLUCOSE BLDC GLUCOMTR-MCNC: 197 MG/DL (ref 70–99)
GLUCOSE BLDC GLUCOMTR-MCNC: 203 MG/DL (ref 70–99)
GLUCOSE BLDC GLUCOMTR-MCNC: 211 MG/DL (ref 70–99)
HOLD SPECIMEN: 0
INTERPRETATION ECG - MUSE: NORMAL
P AXIS - MUSE: 8 DEGREES
PR INTERVAL - MUSE: 166 MS
QRS DURATION - MUSE: 110 MS
QT - MUSE: 454 MS
QTC - MUSE: 479 MS
R AXIS - MUSE: -20 DEGREES
SYSTOLIC BLOOD PRESSURE - MUSE: NORMAL MMHG
T AXIS - MUSE: 69 DEGREES
TROPONIN I SERPL-MCNC: 0.02 NG/ML (ref 0–0.29)
VENTRICULAR RATE- MUSE: 67 BPM

## 2022-09-14 PROCEDURE — 93306 TTE W/DOPPLER COMPLETE: CPT

## 2022-09-14 PROCEDURE — 250N000011 HC RX IP 250 OP 636: Performed by: INTERNAL MEDICINE

## 2022-09-14 PROCEDURE — 258N000003 HC RX IP 258 OP 636: Performed by: INTERNAL MEDICINE

## 2022-09-14 PROCEDURE — 250N000013 HC RX MED GY IP 250 OP 250 PS 637: Performed by: INTERNAL MEDICINE

## 2022-09-14 PROCEDURE — 99232 SBSQ HOSP IP/OBS MODERATE 35: CPT | Performed by: INTERNAL MEDICINE

## 2022-09-14 PROCEDURE — 36415 COLL VENOUS BLD VENIPUNCTURE: CPT | Performed by: INTERNAL MEDICINE

## 2022-09-14 PROCEDURE — 84484 ASSAY OF TROPONIN QUANT: CPT | Performed by: INTERNAL MEDICINE

## 2022-09-14 PROCEDURE — 93005 ELECTROCARDIOGRAM TRACING: CPT | Performed by: INTERNAL MEDICINE

## 2022-09-14 PROCEDURE — 210N000001 HC R&B IMCU HEART CARE

## 2022-09-14 PROCEDURE — 83880 ASSAY OF NATRIURETIC PEPTIDE: CPT | Performed by: INTERNAL MEDICINE

## 2022-09-14 PROCEDURE — 99222 1ST HOSP IP/OBS MODERATE 55: CPT | Performed by: INTERNAL MEDICINE

## 2022-09-14 PROCEDURE — 93306 TTE W/DOPPLER COMPLETE: CPT | Mod: 26 | Performed by: INTERNAL MEDICINE

## 2022-09-14 PROCEDURE — 93010 ELECTROCARDIOGRAM REPORT: CPT | Performed by: INTERNAL MEDICINE

## 2022-09-14 PROCEDURE — 83695 ASSAY OF LIPOPROTEIN(A): CPT | Performed by: INTERNAL MEDICINE

## 2022-09-14 PROCEDURE — 93005 ELECTROCARDIOGRAM TRACING: CPT

## 2022-09-14 RX ORDER — ASPIRIN 81 MG/1
81 TABLET ORAL DAILY
Status: DISCONTINUED | OUTPATIENT
Start: 2022-09-14 | End: 2022-09-17 | Stop reason: HOSPADM

## 2022-09-14 RX ORDER — SODIUM CHLORIDE 9 MG/ML
INJECTION, SOLUTION INTRAVENOUS CONTINUOUS
Status: DISCONTINUED | OUTPATIENT
Start: 2022-09-14 | End: 2022-09-15

## 2022-09-14 RX ADMIN — AMLODIPINE BESYLATE 5 MG: 5 TABLET ORAL at 21:33

## 2022-09-14 RX ADMIN — ROSUVASTATIN CALCIUM 40 MG: 40 TABLET, FILM COATED ORAL at 08:41

## 2022-09-14 RX ADMIN — LIDOCAINE: 50 OINTMENT TOPICAL at 08:43

## 2022-09-14 RX ADMIN — POLYETHYLENE GLYCOL 3350 17 G: 17 POWDER, FOR SOLUTION ORAL at 08:41

## 2022-09-14 RX ADMIN — SODIUM PHOSPHATE, DIBASIC AND SODIUM PHOSPHATE, MONOBASIC 1 ENEMA: 7; 19 ENEMA RECTAL at 14:40

## 2022-09-14 RX ADMIN — ACETAMINOPHEN 975 MG: 325 TABLET, FILM COATED ORAL at 12:39

## 2022-09-14 RX ADMIN — ISOSORBIDE MONONITRATE 60 MG: 60 TABLET, EXTENDED RELEASE ORAL at 08:41

## 2022-09-14 RX ADMIN — LABETALOL HYDROCHLORIDE 10 MG: 5 INJECTION, SOLUTION INTRAVENOUS at 09:53

## 2022-09-14 RX ADMIN — ACETAMINOPHEN 975 MG: 325 TABLET, FILM COATED ORAL at 00:37

## 2022-09-14 RX ADMIN — HYDRALAZINE HYDROCHLORIDE 10 MG: 20 INJECTION, SOLUTION INTRAMUSCULAR; INTRAVENOUS at 01:10

## 2022-09-14 RX ADMIN — LIDOCAINE: 50 OINTMENT TOPICAL at 16:04

## 2022-09-14 RX ADMIN — METOPROLOL SUCCINATE 200 MG: 100 TABLET, EXTENDED RELEASE ORAL at 08:42

## 2022-09-14 RX ADMIN — HYDRALAZINE HYDROCHLORIDE 10 MG: 20 INJECTION, SOLUTION INTRAMUSCULAR; INTRAVENOUS at 15:52

## 2022-09-14 RX ADMIN — ACETAMINOPHEN 975 MG: 325 TABLET, FILM COATED ORAL at 17:50

## 2022-09-14 RX ADMIN — PANTOPRAZOLE SODIUM 20 MG: 20 TABLET, DELAYED RELEASE ORAL at 15:52

## 2022-09-14 RX ADMIN — ASPIRIN 81 MG: 81 TABLET ORAL at 10:44

## 2022-09-14 RX ADMIN — LIDOCAINE: 50 OINTMENT TOPICAL at 14:40

## 2022-09-14 RX ADMIN — SODIUM CHLORIDE: 9 INJECTION, SOLUTION INTRAVENOUS at 10:46

## 2022-09-14 RX ADMIN — PANTOPRAZOLE SODIUM 20 MG: 20 TABLET, DELAYED RELEASE ORAL at 08:42

## 2022-09-14 RX ADMIN — ACETAMINOPHEN 975 MG: 325 TABLET, FILM COATED ORAL at 07:00

## 2022-09-14 ASSESSMENT — ACTIVITIES OF DAILY LIVING (ADL)
ADLS_ACUITY_SCORE: 26
ADLS_ACUITY_SCORE: 27
ADLS_ACUITY_SCORE: 26

## 2022-09-14 NOTE — PROGRESS NOTES
[facility] ACUTE PAIN SERVICE    ([facility], [facility], [facility])  Daily PAIN Progress Note    Assessment/Plan:  Bettina Guerrero is a 72 year old female who was admitted on 9/8/2022.  Pain team was asked to see the patient for uncontrolled pain with SMA subacute dissection. . Admitted for syncope, and reported diarrhea x 2 days, abdominal pain and nausea, shortness of breath and pain between her shoulder blades. Patient reporting abdominal pain x 4 years. History including CKD3 , COPD, HLD, HTN, NIDDM, CAD and ischemic cardiomyopathy. Vascular Surgery consulted, per note CTA showing abnormal low density filling defects sen in the proximal aspect of the SMA, no surgical intervention indicated. Diet is advance diet.  The patient does not smoke and denies chemical dependency history.      Opioid Induced Respiratory Depression Risk Assessment:High, age, COPD, CKD,  Cardiomyopathy.?     Noted allergies/intolerance-oxycodone-itching, tramadol-diarrhea      Pt reporting lower abdominal, under rib cage pain, which wraps around to back, described as tight, and if something is sitting on her chest, pain is constant and pressure like. Patient did report left chest, breast pain, sharp pain which started just a few minutes before I visited with her. I discussed with staff, and vitals were taken. I discussed with Hospitalist, he will evaluate, but pain was not present at his visit earlier. . Pt reporting Tylenol is decreasing pain from 7-810 to a 5/10 which is tolerable for her. Will schedule APAP and topical lidocaine ointment for now and re-assess pain in left chest area.  Would continue non-opioid pain management for now, would not want to mask any cardiac symptoms. Patient has allergies/intolerances to oxycodone/tramadol so if an opioid is tried could consider hydrocodone or hydromorphone at low dose. However at this time will maximize multimodal therapy.      PLAN:   1) Pain is consistent with  abddominal pain that has been present for last 4 years and per vascular surgery Low likelihood that her abdominal pain that has been present for last 4 years is related to short segment non flow limiting dissection of SMA  2)Multimodal Medication Therapy  Topical: trial lidocaine ointment QID to chest/abdomen/back  NSAID'S: CrCl 42 ml/min. No NSAIDs due to renal function and cardiomyopathy.  Muscle Relaxants: none  Adjuvants:  Tylenol 500-1000 mg q 6 h prn - schedule 975 mg po QID, NTG s/l prn for chest pain   Antidepressants/anxiolytics: none, melatonin prn sleep  Opioids: none  IV Pain medication: none  3)Non-medication interventions: per nursing  Acupuncture consult - if patient agreeable  Integrative consult - if patient agreeable  4)Constipation Prophylaxis:  Miralax dailly  5) Care Teams: [facility], Vascular Surgery  -Opioid prescriber has been none  -[place]  pulled from system on 09/13/22. This indicates patient not prescribed opioids in time period of last year.   Discharge Recommendations - We recommend prescribing the following at the time of discharge: DANETTE Johnson [facility]  Acute Care Pain Management Program   Hours of pain coverage 7a-1700- after 1700 please call the house officer   [facility] ([place], [place])   [phone]

## 2022-09-14 NOTE — CONSULTS
CARDIOLOGY CONSULT NOTE         Assessment:   1.  Chest Pain and shortness of breath-atypical, stabbing, onset at rest.  Relieved after 30 minutes.  Enzymes thus far normal as is ECG.  Doubt coronary ischemia but given extensive past history we will arrange for pharmacological stress nuclear given her inability to walk.  If medium or large sized area of ischemia will then pursue angiography.  2.  Cardiomyopathy-ejection fraction in past 40%, will recheck echo.  3.  Coronary artery disease-angiography in 2018 apparently showed normal left main, significant mid LAD stenosis that received a stent, significant stenosis of diagonal 1 and diagonal 2 that received stents, circumflex was normal and the obtuse marginal artery had significant disease requiring a stent, and the right coronary artery to distal disease requiring a stent.  Given this three-vessel disease will arrange for pharmacological stress nuclear as above.  3.  Abdominal pain, epigastric-felt to be due to ischemia involving superior mesenteric artery.  Will try some nitroglycerin patch given continued discomfort.  5.  Diabetes mellitus (H)-so noted.  6.  Hypertension-so noted.  7.  Hyperlipidemia LDL goal <100-so noted.  We will check LP(a) given that she has had such extensive vascular history.     Plan:   1.  Continue to check troponin to rule out MI.  2.  BNP looking for heart failure.  3.  Echo looking for cardiomyopathy.  4.  Pharmacological stress nuclear.  5.  Check LP(a) given extensive vascular disease.  6.  Nitroglycerin patch given vascular disease.  7.  Can follow with me or primary cardiologist at [facility].     Current History:   [facility] has been requested by Dr. Dorado to evaluate Bettina Guerrero  who is a 72 year old year old white female for chest pain.  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  Conservative treatment is proceeding.  This morning, she developed chest stabbing sensation in bed, she states she was short of breath with this.  It did not go away until about a half an hour later.  Given this cardiology consultation was requested.  She declines any chest discomfort at home since her MI in 2018, does admit to generalized shortness of breath and minimal activity but does live independently with her .    Past Medical History:     Past Medical History:   Diagnosis Date     Asthma in adult      Diabetes mellitus (H)      Hypertension  MI  PVD  Coronary Artery Disease  Pure hypercholesterolemia       Past history is negative for cancer, tuberculosis, rheumatic fever, cerebrovascular accident, chronic kidney disease, peptic ulcer disease, chronic obstructive pulmonary disease, or thyroid disorder.    Past Surgical History:     Past Surgical History:   Procedure Laterality Date     CHOLECYSTECTOMY       COLONOSCOPY N/A 5/11/2016    Procedure: COLONOSCOPY;  Surgeon: Ady Sandoval MD;  Location:  GI     LAPAROSCOPIC TUBAL LIGATION       LUMPECTOMY BREAST Right 6/22/2016    Procedure: LUMPECTOMY BREAST;  Surgeon: Shameka Nino MD;  Location:  OR     Family History:   Reviewed, and positive for MI in mother sister in her 50s or 60s.    Social History:   Reviewed, and she  reports that she has never smoked. She has never used smokeless tobacco. She reports that she does not drink alcohol and does not use drugs.  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    Meds:       acetaminophen  975 mg Oral Q6H     amLODIPine  5 mg Oral QPM     aspirin  81 mg Oral Daily     insulin aspart  1-6 Units Subcutaneous Q4H     [Held by provider] insulin aspart   Subcutaneous Daily with breakfast     [Held by  provider] insulin aspart   Subcutaneous Daily with lunch     [Held by provider] insulin aspart   Subcutaneous Daily with supper     [Held by provider] insulin aspart  1-3 Units Subcutaneous TID AC     [Held by provider] insulin aspart  1-3 Units Subcutaneous At Bedtime     insulin glargine  18 Units Subcutaneous At Bedtime     isosorbide mononitrate  60 mg Oral Daily     lidocaine   Topical 4x Daily     [Held by provider] lisinopril  40 mg Oral Daily     metoprolol succinate ER  200 mg Oral Daily     pantoprazole  20 mg Oral BID AC     polyethylene glycol  17 g Oral Daily     rosuvastatin  40 mg Oral Daily     sodium chloride (PF)  3 mL Intracatheter Q8H     Allergies:   Seasonal allergies, Oxycodone-acetaminophen, and Tramadol    Review of Systems:   A 12 point comprehensive review of systems was  as follows:   General: Positive for fatigue, negative weight loss or weight gain  Constitutional: negative for anorexia, chills, fevers, malaise, night sweats   Eyes: negative for cataracts, color blindness, contacts/glasses, glaucoma, icterus, irritation, redness and visual disturbance  Ears, nose, mouth, throat, and face: negative for ear drainage, earaches, epistaxis, facial trauma, hearing loss, hoarseness, nasal congestion, snoring, sore mouth, sore throat, tinnitus and voice change  Respiratory: Positive dyspnea on exertion, PND, orthopnea, negative hemoptysis, sputum production, pleurisy, stridor, wheezing,   Cardiovascular: Positive for chest pain, negative chest pressure/discomfort, claudication, dyspnea, exertional chest pressure/discomfort, fatigue, irregular heart beat, lower extremity edema, near-syncope,  palpitations,  syncope   Gastrointestinal: Positive for abdominal pain, constipation, negative change in bowel haibits, diarrhea, dyspepsia, dysphagia, jaundice, melena, nausea, odynophagia, reflux symptoms and vomiting  Genitourinary: negative for decreased stream  Hematologic/lymphatic: negative for  "bleeding  Musculoskeletal: negative for arthralgias, back pain, bone pain, muscle weakness, myalgias, neck pain and stiff joints  Neurological: negative for syncope, presyncope, coordination problems, dizziness, gait problems, headaches, memory problems, paresthesia, seizures, speech problems, tremors, vertigo and weakness    Objective:     Physical Exam:  /57 (BP Location: Left arm, Patient Position: Fowlers)   Pulse 69   Temp 97.9  F (36.6  C) (Oral)   Resp 18   Ht 1.6 m (5' 3\")   Wt 80.9 kg (178 lb 6.4 oz)   SpO2 95%   BMI 31.60 kg/m       Head:    Normocephalic, without obvious abnormality, atraumatic   Eyes:    PERRL, conjunctiva/corneas clear, EOM's intact,           Nose:   Nares normal, septum midline, mucosa normal, no drainage  or sinus tenderness   Throat:   Lips, mucosa, and tongue normal; teeth and gums normal   Neck:   Supple, symmetrical, trachea midline, no adenopathy;        thyroid:  No enlargement/tenderness/nodules; no carotid    bruit or JVD   Back:     Symmetric, no curvature, ROM normal, no CVA tenderness   Lungs:     Clear to auscultation bilaterally, respirations unlabored   Chest wall:    No tenderness or deformity   Heart:    Regular rate and rhythm, S1 and S2 normal, 2/6 diastolic blowing murmur, no rub   or gallop   Abdomen:     Soft, non-tender, bowel sounds active all four quadrants,     no masses, no organomegaly   Extremities:   Extremities normal, atraumatic, no cyanosis or edema   Pulses:   2+ and symmetric all extremities   Skin:   Skin color, texture, turgor normal, no rashes or lesions   Neurologic:   CNII-XII intact. Normal strength, sensation and reflexes       throughout     Cardiographics and Imaging  Radiology Results: Chest x-ray shows   1.  No significant change in the appearance of the nonocclusive intraluminal thrombus or small dissection proximal superior mesenteric artery, suboptimally opacified due to technical issues.  2.  Indeterminate, enhancing right " adrenal mass is unchanged. Considerations would include a lipid poor adenoma versus other neoplasm. Recommend follow-up noncontrast CT exam in 3 months for reevaluation.  3.  Stable right adnexal cyst. Please see guidelines described on previous report for further evaluation.  4.  Gas within the bladder presumed iatrogenic. Clinical correlation recommended.    EKG Results: personally reviewed sinus rhythm, leftward axis possible old inferior Q wave MI type pattern, occasional PVC, no acute changes.    Lab Review   Pertinent Labs  Lab Results: personally reviewed       Lab Results   Component Value Date    TROPONINI 0.02 09/14/2022       Lab Results   Component Value Date    BUN 17 09/12/2022     09/12/2022    CO2 23 09/12/2022       Lab Results   Component Value Date    WBC 4.6 09/10/2022    HGB 11.1 (L) 09/10/2022    HCT 34.3 (L) 09/10/2022    MCV 93 09/10/2022     09/10/2022       Lab Results   Component Value Date     (H) 09/09/2022

## 2022-09-14 NOTE — PROGRESS NOTES
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   Pain (which she states was new compared to other pains she has had in the past) is slowly improving so will advance diet - discussed with vascular.  Repeat CTA 9/12 showing tayo significant change.        Abdominal pain  Possibly subacute arterial dissection of the SMA.  CTA showed 75% stenosis of proximal left renal artery, short segment of SMA stenosis, partially ulcerated atheromatous plaque proximal SMA and no evidence of bowel ischemia. As per vascular surgeon, findings are likely secondary to subacute arterial dissection.   Advance diet as tolerated  Keep SBP < 130 and HR < 70  As per vascular surgeon, no indication for antiplatelet;will keep ASA (CAD) but discontinue Plavix  Continue rosuvastatin 40 mg daily    Repeat CT scan 9/12/22 per vascular surgery service - no changes  Consult pain management  -GI consultation for possible EGD since vascular surgeon didn't think pt's pain is due to subacute arterial dissection of the SMA    Chest pain  Multivessel CAD ischemic cardiomyopathy, EF 40%.   -acute onset this morning  -check ECG and trop  -cardiology consultation due to h/o multivessel CAD   Continue PT Imdur, aspirin, Toprol-XL.     R renal artery stenosis    -defer to vascular surgeon    Incidental imaging findings:  Indeterminate R adrenal nodule and ovarian cyst - outpatient f/u     Essential hypertension  Keep SBP < 130 per vascular surgeon   Continue PTA Toprol-XL  Add norvasc to get BP below goal  Prn IV  hydralazine  Lisinopril on hold due to renal stenosis     Non-insulin-dependent  diabetes mellitus.    A1c 12.4 in June 2018; repeat A1c on admit was 10.0  Hold PTA metformin and glipizide. on discharge would probably add januvia or SGLT2I given poor control  Insulin sliding scale    lantus - 16 units hs  Insulin carb coverage 1:15         RENEA/Stage III CKD   Improved at 1.14 down from Creatinine 2.0 on 9/8/22 compared to 1.14 on 8/12/22  Probably was pre-renal  Back to baseline now.       GERD  Continue pantoprazole      COPD  Not in exacerbation  Continue home inhalers.     Hyponatremia -resolved               Diet: NPO per Anesthesia Guidelines for Procedure/Surgery Except for: Meds, Ice Chips     DVT Prophylaxis: Pneumatic Compression Devices  Muniz Catheter: Not present  Central Lines: None  Cardiac Monitoring: None  Code Status: Full Code      Disposition Plan    > 2 days     The patient's care was discussed with the Patient.    Riky Dorado MD  Hospitalist Service  [facility]  Securely message with the Vocera Web Console (learn more here)  Text page via [employer] Paging/Directory         Clinically Significant Risk Factors Present on Admission                      ______________________________________________________________________    Interval History   C/o upper abd pain 5/10; new cp started after my visit as per pain team report.   No n/v, unclear if oral intake worsening pain. No f/c.     Data reviewed today: I reviewed all medications, new labs and imaging results over the last 24 hours.     Physical Exam   Vital Signs: Temp: 97.9  F (36.6  C) Temp src: Oral BP: (!) 145/66 Pulse: 61   Resp: 18 SpO2: 96 % O2 Device: None (Room air)    Weight: 178 lbs 6.4 oz  General.  Awake alert oriented not in acute distress. C/o upper abd pain  HEENT.  Pupils equal round react to light, anicteric, EOM intact.  Neck supple no JVD.  CVS regular rhythm no murmur gallops.  Lungs.  Clear to auscultation bilateral no wheezing or rales.  Abdomen.  Soft, epigastric tender+, bowel  sounds present.  Extremities.  No edema no calf tenderness.  Neurological.  Awake and alert. No focal deficit.  Skin no rash. No pallor.  Psych. frustrated      Data   Recent Labs   Lab 09/14/22  0735 09/13/22  2056 09/13/22  1657 09/12/22  0847 09/12/22  0456 09/11/22  1206 09/11/22  1133 09/10/22  0855 09/10/22  0850 09/09/22  0820 09/09/22  0805 09/09/22  0022 09/08/22  1609   WBC  --   --   --   --   --   --   --   --  4.6  --  4.8  --  6.7   HGB  --   --   --   --   --   --   --   --  11.1*  --  11.1*  --  11.9   MCV  --   --   --   --   --   --   --   --  93  --  93  --  94   PLT  --   --   --   --   --   --   --   --  176  --  184  --  200   NA  --   --   --   --  136  --  134*  --  138  --  138  --  137   POTASSIUM  --   --   --   --  4.2  --  5.0  --  4.2  --  3.8  --  4.2   CHLORIDE  --   --   --   --  108*  --  106  --  108*  --  108*  --  103   CO2  --   --   --   --  23  --  22  --  23  --  24  --  24   BUN  --   --   --   --  17  --  14  --  22  --  31*  --  40*   CR  --   --   --   --  1.24*  --  1.14*  --  1.14*  --  1.25*  --  2.00*   ANIONGAP  --   --   --   --  5  --  6  --  7  --  6  --  10   DEB  --   --   --   --  8.3*  --  8.8  --  8.7  --  8.2*  --  9.0   * 173* 162*   < > 125   < > 277*   < > 172*   < > 133*   < > 258*   ALBUMIN  --   --   --   --   --   --   --   --   --   --  3.2*  --  3.8   PROTTOTAL  --   --   --   --   --   --   --   --   --   --  6.0  --  7.2   BILITOTAL  --   --   --   --   --   --   --   --   --   --  0.5  --  0.7   ALKPHOS  --   --   --   --   --   --   --   --   --   --  52  --  60   ALT  --   --   --   --   --   --   --   --   --   --  10  --  14   AST  --   --   --   --   --   --   --   --   --   --  12  --  13   LIPASE  --   --   --   --   --   --   --   --   --   --   --   --  <9    < > = values in this interval not displayed.     No results found for this or any previous visit (from the past 24 hour(s)).  Medications     sodium chloride          acetaminophen  975 mg Oral Q6H     amLODIPine  5 mg Oral QPM     aspirin  81 mg Oral Daily     insulin aspart  1-6 Units Subcutaneous Q4H     [Held by provider] insulin aspart   Subcutaneous Daily with breakfast     [Held by provider] insulin aspart   Subcutaneous Daily with lunch     [Held by provider] insulin aspart   Subcutaneous Daily with supper     [Held by provider] insulin aspart  1-3 Units Subcutaneous TID AC     [Held by provider] insulin aspart  1-3 Units Subcutaneous At Bedtime     insulin glargine  18 Units Subcutaneous At Bedtime     isosorbide mononitrate  60 mg Oral Daily     lidocaine   Topical 4x Daily     lisinopril  40 mg Oral Daily     metoprolol succinate ER  200 mg Oral Daily     pantoprazole  20 mg Oral BID AC     polyethylene glycol  17 g Oral Daily     rosuvastatin  40 mg Oral Daily     sodium chloride (PF)  3 mL Intracatheter Q8H

## 2022-09-14 NOTE — PROGRESS NOTES
[facility] ACUTE PAIN SERVICE    ([facility], [facility], [facility])  Daily PAIN Progress Note    Assessment/Plan:  Bettina Guerrero is a 72 year old female who was admitted on 9/8/2022.  Pain team was asked to see the patient for uncontrolled pain with SMA subacute dissection. . Admitted for syncope, and reported diarrhea x 2 days, abdominal pain and nausea, shortness of breath and pain between her shoulder blades. Patient reporting abdominal pain x 4 years. History including CKD3 , COPD, HLD, HTN, NIDDM, CAD and ischemic cardiomyopathy. Vascular Surgery consulted, per note CTA showing abnormal low density filling defects sen in the proximal aspect of the SMA, no surgical intervention indicated. Diet is advance diet.  The patient does not smoke and denies chemical dependency history.      Opioid Induced Respiratory Depression Risk Assessment:High, age, COPD, CKD,  Cardiomyopathy.?     Noted allergies/intolerance-oxycodone-itching, tramadol-diarrhea      Opioids not prescribed at this time due to Tylenol has been very helpful for the pressure like pain over abdomen going to back. This morning, patient was clenching her left upper chest, breast area, which patient reports is new, It had just started a few minutes prior our visit, so I alerted RN, and spoke with Hospitalist, and patient will be evaluated. Otherwise, pressure like feeling is about the same, constant at 5/10, with Tylenol helpful  .    Discussed with Dr. Dorado     PLAN:   1) Pain is consistent with abddominal pain that has been present for last 4 years and per vascular surgery Low likelihood that her abdominal pain that has been present for last 4 years is related to short segment non flow limiting dissection of SMA  2)Multimodal Medication Therapy  Topical: trial lidocaine ointment QID to chest/abdomen/back  NSAID'S: CrCl 42 ml/min. No NSAIDs due to renal function and cardiomyopathy.  Muscle Relaxants: none  Adjuvants:  Tylenol  500-1000 mg q 6 h prn - schedule 975 mg po QID, NTG s/l prn for chest pain   Antidepressants/anxiolytics: none, melatonin prn sleep  Opioids: none  IV Pain medication: none  3)Non-medication interventions: per nursing  Acupuncture consult - if patient agreeable  Integrative consult - if patient agreeable  4)Constipation Prophylaxis:  Miralax dailly  5) [place] [facility] [place]  -Opioid prescriber has been none  -MN  pulled from system on 09/13/22. This indicates patient not prescribed opioids in time period of last year.   Discharge Recommendations - We recommend prescribing the following at the time of discharge: TBD            Abdominal pain, epigastric   Patient Active Problem List   Diagnosis     Diabetes mellitus (H)     Hypertension     Asthma in adult     Excessive daytime sleepiness     Snoring     Hyperlipidemia LDL goal <100     Intraductal papilloma of right breast     Non-adherence to medical treatment     Diarrhea, unspecified type     Abdominal pain, epigastric        History   Drug Use No         Tobacco Use      Smoking status: Never Smoker      Smokeless tobacco: Never Used          acetaminophen  975 mg Oral Q6H     amLODIPine  5 mg Oral QPM     aspirin  81 mg Oral Daily     insulin aspart  1-6 Units Subcutaneous Q4H     [Held by provider] insulin aspart   Subcutaneous Daily with breakfast     [Held by provider] insulin aspart   Subcutaneous Daily with lunch     [Held by provider] insulin aspart   Subcutaneous Daily with supper     [Held by provider] insulin aspart  1-3 Units Subcutaneous TID AC     [Held by provider] insulin aspart  1-3 Units Subcutaneous At Bedtime     insulin glargine  18 Units Subcutaneous At Bedtime     isosorbide mononitrate  60 mg Oral Daily     lidocaine   Topical 4x Daily     lisinopril  40 mg Oral Daily     metoprolol succinate ER  200 mg Oral Daily     pantoprazole  20 mg Oral BID AC     polyethylene glycol  17 g Oral Daily     rosuvastatin  40 mg Oral Daily      sodium chloride (PF)  3 mL Intracatheter Q8H         [facility]  Acute Care Pain Management Program   Hours of pain coverage 7a-1700- after 1700 please call the house officer   [facility] ([facility])   [phone]

## 2022-09-14 NOTE — PLAN OF CARE
"  Problem: Plan of Care - These are the overarching goals to be used throughout the patient stay.    Goal: Plan of Care Review/Shift Note  Description: The Plan of Care Review/Shift note should be completed every shift.  The Outcome Evaluation is a brief statement about your assessment that the patient is improving, declining, or no change.  This information will be displayed automatically on your shift note.  Outcome: Ongoing, Progressing  Goal: Patient-Specific Goal (Individualized)  Description: You can add care plan individualizations to a care plan. Examples of Individualization might be:  \"Parent requests to be called daily at 9am for status\", \"I have a hard time hearing out of my right ear\", or \"Do not touch me to wake me up as it startles me\".  Outcome: Ongoing, Progressing  Goal: Absence of Hospital-Acquired Illness or Injury  Outcome: Ongoing, Progressing  Intervention: Identify and Manage Fall Risk  Recent Flowsheet Documentation  Taken 9/13/2022 1630 by Raquel Valles RN  Safety Promotion/Fall Prevention:   activity supervised   fall prevention program maintained   increased rounding and observation   clutter free environment maintained   nonskid shoes/slippers when out of bed   toileting scheduled  Taken 9/13/2022 1240 by Raquel Valles RN  Safety Promotion/Fall Prevention:   activity supervised   fall prevention program maintained   increased rounding and observation   clutter free environment maintained   nonskid shoes/slippers when out of bed   toileting scheduled  Taken 9/13/2022 0840 by Raquel Valles RN  Safety Promotion/Fall Prevention:   activity supervised   fall prevention program maintained   increased rounding and observation   clutter free environment maintained   nonskid shoes/slippers when out of bed   toileting scheduled  Intervention: Prevent Skin Injury  Recent Flowsheet Documentation  Taken 9/13/2022 1630 by Raquel Valles, RN  Body Position: position changed " independently  Taken 9/13/2022 1240 by Raquel Valles RN  Body Position: position changed independently  Taken 9/13/2022 0840 by Raquel Valles RN  Body Position: position changed independently  Intervention: Prevent and Manage VTE (Venous Thromboembolism) Risk  Recent Flowsheet Documentation  Taken 9/13/2022 1630 by Raquel Valles RN  VTE Prevention/Management: (on ASA and plavix) other (see comments)  Activity Management:   ambulated to bathroom   ambulated in room   activity adjusted per tolerance  Taken 9/13/2022 1240 by Raquel Valles RN  VTE Prevention/Management: (on ASA and plavix) other (see comments)  Activity Management:   ambulated to bathroom   ambulated in room   activity adjusted per tolerance  Taken 9/13/2022 0840 by Raquel Valles RN  VTE Prevention/Management: (on ASA and plavix) other (see comments)  Activity Management: activity adjusted per tolerance  Goal: Optimal Comfort and Wellbeing  Outcome: Ongoing, Progressing  Intervention: Monitor Pain and Promote Comfort  Recent Flowsheet Documentation  Taken 9/13/2022 1011 by Raquel Valles RN  Pain Management Interventions:   care clustered   medication (see MAR)  Goal: Readiness for Transition of Care  Outcome: Ongoing, Progressing     Problem: Risk for Delirium  Goal: Optimal Coping  Outcome: Ongoing, Progressing  Goal: Improved Behavioral Control  Outcome: Ongoing, Progressing  Goal: Improved Attention and Thought Clarity  Outcome: Ongoing, Progressing  Intervention: Maximize Cognitive Function  Recent Flowsheet Documentation  Taken 9/13/2022 1630 by Raquel Valles RN  Sensory Stimulation Regulation:   care clustered   lighting decreased  Reorientation Measures: glasses use encouraged  Taken 9/13/2022 1240 by Raquel Valles RN  Sensory Stimulation Regulation:   care clustered   lighting decreased  Reorientation Measures: glasses use encouraged  Taken 9/13/2022 0840 by Raquel Valles RN  Sensory Stimulation Regulation:    care clustered   quiet environment promoted  Reorientation Measures: glasses use encouraged  Goal: Improved Sleep  Outcome: Ongoing, Progressing     Problem: Pain Acute  Goal: Acceptable Pain Control and Functional Ability  Outcome: Ongoing, Progressing  Intervention: Develop Pain Management Plan  Recent Flowsheet Documentation  Taken 9/13/2022 1011 by Raquel Valles RN  Pain Management Interventions:   care clustered   medication (see MAR)  Intervention: Prevent or Manage Pain  Recent Flowsheet Documentation  Taken 9/13/2022 1630 by Raquel Valles RN  Sensory Stimulation Regulation:   care clustered   lighting decreased  Medication Review/Management: medications reviewed  Taken 9/13/2022 1240 by Raquel Valles RN  Sensory Stimulation Regulation:   care clustered   lighting decreased  Medication Review/Management: medications reviewed  Taken 9/13/2022 0840 by Raquel Valles RN  Sensory Stimulation Regulation:   care clustered   quiet environment promoted  Medication Review/Management: medications reviewed     Problem: Cardiac Output Decreased (Pulmonary Hypertension)  Goal: Effective Cardiac Output  Outcome: Ongoing, Progressing  Intervention: Optimize Cardiac Output  Recent Flowsheet Documentation  Taken 9/13/2022 1630 by Raquel Valles RN  Head of Bed (HOB) Positioning: HOB at 30 degrees  Taken 9/13/2022 1240 by Raquel Valles RN  Head of Bed (HOB) Positioning: HOB at 30 degrees  Taken 9/13/2022 0840 by Raquel Valles RN  Head of Bed (HOB) Positioning: HOB at 20-30 degrees   Goal Outcome Evaluation:        Patient had an episode of epigatric pain of 6 but 4-5 currently; Md present at bedside during episode; vitals checked before and after; IV hydralazine given for goal of SBP <120. CT showed no change. Tylenol as a scheduled order. SR and SB both with 1st degree Heart block. RA. New order for protonix and plavix. Notified PM nurse.

## 2022-09-14 NOTE — CONSULTS
[facility]    Bettina Guerrero   [address]    [place] [address]  72 year old female    Admission Date/Time: 9/8/2022  3:22 PM    Primary Care Provider:  [facility]    Requesting Physician: Riky Dorado MD    ASSESSMENT AND RECOMMENDATIONS:   71 yo woman w CAD, cardiomyopathy, and superior mesenteric artery dissection who presents for epigastric pain.     Imaging has repeatedly demonstrated a distended stomach. May represent delayed gastric emptying- GOO vs gastroparesis.     Plan for EGD tomorrow depending on results of cardiac stress test.     Also notes constipation and feels like she may feel better if she had a bowel movement. Enema ordered.    Approximately 45 minutes of total time was spent providing patient care including patient evaluation, reviewing documentation/test results, and .            Blair De La Cruz MD  Thank you for the opportunity to participate in the care of this patient.   Please feel free to call me with any questions or concerns.  Phone number [phone] or if after 5PM [phone].         CHIEF COMPLAINT:   Epigastric pain    REASON FOR THE CONSULT:  Epigastric pain    HPI:   71 yo woman w CAD, cardiomyopathy, and superior mesenteric artery dissection who presents for epigastric pain.     Constant bandlike abdominal pain with nausea but no vomiting since Sept 8 which sometimes worsens with food. Last BM Sept 8. States stool is black. States she has 1 BM per month, which will then turn into diarrhea so she will also take imodium once a month. Had a relatively unremarkable colonoscopy in 2016. No NSAIDs. Non smoker. 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     Noted to have superior mesenteric artery dissection. Not felt to be causing her current symptoms though. Vascular surgery is involved.     REVIEW OF SYSTEMS:  10 point ROS otherwise negative.     PAST MEDICAL HISTORY:  Past Medical History:  "  Diagnosis Date     Asthma in adult      Diabetes mellitus (H)      Hypertension        PAST SURGICAL HISTORY:  Past Surgical History:   Procedure Laterality Date     CHOLECYSTECTOMY       COLONOSCOPY N/A 5/11/2016    Procedure: COLONOSCOPY;  Surgeon: Ady Sandoval MD;  Location: [facility]     LAPAROSCOPIC TUBAL LIGATION       LUMPECTOMY BREAST Right 6/22/2016    Procedure: LUMPECTOMY BREAST;  Surgeon: Shameka Nino MD;  Location: [facility]       FAMILY HISTORY:  Family History   Problem Relation Age of Onset     Esophageal Cancer Mother      Lung Cancer Father      Liver Cancer Father      Lymphoma Daughter      Bone Cancer Maternal Aunt      Leukemia Maternal Aunt        SOCIAL HISTORY:  Social History     Tobacco Use     Smoking status: Never Smoker     Smokeless tobacco: Never Used   Substance Use Topics     Alcohol use: No     Alcohol/week: 0.0 standard drinks       ALLERGIES/SENSITIVITIES:  Seasonal allergies, Oxycodone-acetaminophen, and Tramadol    MEDICATIONS:  No current outpatient medications on file.         PHYSICAL EXAM:  /58 (BP Location: Left arm)   Pulse 62   Temp 97.4  F (36.3  C) (Oral)   Resp 18   Ht 1.6 m (5' 3\")   Wt 80.9 kg (178 lb 6.4 oz)   SpO2 94%   BMI 31.60 kg/m    Body mass index is 31.6 kg/m .  General: A&O, NAD, non-toxic appearing  Eyes: No icterus or conjunctivitis  ENT: MMM, oropharynx clear without ulcerations  Neck/Thyroid: Supple, no masses  Pulmonary: CTA B  Cardiovascular: RR, S1, S2  Gastrointestinal: Soft, NTTP, NABS, no r/g, no masses, no HSM  Skin: No jaundice/petechiae/rashes  Lymph nodes: No cervical or supraclavicular lymphadenopathy  Extrem: PPI, no c/c/e      LABORATORY DATA:  CBC:  Recent Labs   Lab Test 09/10/22  0850   WBC 4.6   RBC 3.70*   HGB 11.1*   HCT 34.3*   MCV 93   MCH 30.0   MCHC 32.4   RDW 12.2           BMP:  Recent Labs   Lab 09/14/22  1129 09/14/22  0735 09/13/22  2056 09/12/22  0847 09/12/22  0456 09/11/22  1206 09/11/22  1133 " 09/10/22  0855 09/10/22  0850   NA  --   --   --   --  136  --  134*  --  138   POTASSIUM  --   --   --   --  4.2  --  5.0  --  4.2   CHLORIDE  --   --   --   --  108*  --  106  --  108*   CO2  --   --   --   --  23  --  22  --  23   * 117* 173*   < > 125   < > 277*   < > 172*   CR  --   --   --   --  1.24*  --  1.14*  --  1.14*   BUN  --   --   --   --  17  --  14  --  22    < > = values in this interval not displayed.       INR:  No results for input(s): INR in the last 63962 hours.    Liver and Pancreas panel:  Recent Labs   Lab 09/09/22  0805 09/08/22  1609   AST 12 13   ALT 10 14   ALKPHOS 52 60   BILITOTAL 0.5 0.7   LIPASE  --  <9         IMAGING:    CTA Chest Abdomen Pelvis w Contrast    Result Date: 9/12/2022  EXAM: CTA CHEST ABDOMEN PELVIS W CONTRAST LOCATION: [facility] DATE/TIME: 9/12/2022 6:13 PM INDICATION: Reevaluate SMA dissection COMPARISON: CTA abdomen and pelvis 09/08/2022 TECHNIQUE: CT angiogram chest abdomen pelvis during arterial phase of injection of IV contrast. 2D and 3D MIP reconstructions were performed by the CT technologist. Dose reduction techniques were used. CONTRAST: 100ml isovue 370 FINDINGS: CT ANGIOGRAM CHEST, ABDOMEN, AND PELVIS: Due to technical factors arterial phase imaging was not obtained, and only delayed imaging was performed. Advanced atherosclerotic disease of the aortic arch. No evidence for thoracic aortic dissection or aneurysm. A small focus of dissection or intraluminal thrombus again identified within the proximal superior mesenteric artery on images 133 through 137 of  5 series 5, similar in appearance to the previous exam. The superior mesenteric artery distally is patent. Atherosclerotic disease abdominal aorta and iliac arteries. No evidence for aneurysm. Minimal plaque at the origin of the celiac trunk, without significant narrowing. The inferior mesenteric artery is patent. Plaque and moderate to high-grade stenosis of the left  renal artery, suboptimally opacified. Incidental retroaortic left renal vein. LUNGS AND PLEURA: Curvilinear scarring or atelectasis both lower lobes, unchanged. No new infiltrates or pleural effusions. MEDIASTINUM/AXILLAE: No enlarged mediastinal or hilar nodes. Borderline cardiomegaly. CORONARY ARTERY CALCIFICATION: Severe. Stent in the LAD. HEPATOBILIARY: Cholecystectomy, likely accounting for dilatation of the common bile duct, unchanged. PANCREAS: Normal. SPLEEN: Normal. ADRENAL GLANDS: Enhancing 2.8 x 2.2 cm right adrenal mass is unchanged. KIDNEYS/BLADDER: No renal calculi or hydronephrosis. Tiny cyst right kidney. Small amount of air in the bladder. BOWEL: Marked distention of the stomach with ingested material. No evidence for bowel obstruction. Scattered diverticula throughout the colon, without evidence for diverticulitis. LYMPH NODES: Normal. PELVIC ORGANS: 3.3 x 2.5 cm right adnexal cyst is unchanged. MUSCULOSKELETAL: Mild hypertrophic changes of spine.     IMPRESSION: 1.  No significant change in the appearance of the nonocclusive intraluminal thrombus or small dissection proximal superior mesenteric artery, suboptimally opacified due to technical issues. 2.  Indeterminate, enhancing right adrenal mass is unchanged. Considerations would include a lipid poor adenoma versus other neoplasm. Recommend follow-up noncontrast CT exam in 3 months for reevaluation. 3.  Stable right adnexal cyst. Please see guidelines described on previous report for further evaluation. 4.  Gas within the bladder presumed iatrogenic. Clinical correlation recommended.       CC: [facility], [facility]

## 2022-09-15 ENCOUNTER — APPOINTMENT (OUTPATIENT)
Dept: NUCLEAR MEDICINE | Facility: HOSPITAL | Age: 72
DRG: 300 | End: 2022-09-15
Attending: INTERNAL MEDICINE
Payer: COMMERCIAL

## 2022-09-15 ENCOUNTER — APPOINTMENT (OUTPATIENT)
Dept: CARDIOLOGY | Facility: HOSPITAL | Age: 72
DRG: 300 | End: 2022-09-15
Attending: INTERNAL MEDICINE
Payer: COMMERCIAL

## 2022-09-15 LAB
ANION GAP SERPL CALCULATED.3IONS-SCNC: 5 MMOL/L (ref 5–18)
APO A-I SERPL-MCNC: 223 MG/DL
BUN SERPL-MCNC: 16 MG/DL (ref 8–28)
CALCIUM SERPL-MCNC: 8.3 MG/DL (ref 8.5–10.5)
CHLORIDE BLD-SCNC: 110 MMOL/L (ref 98–107)
CO2 SERPL-SCNC: 21 MMOL/L (ref 22–31)
CREAT SERPL-MCNC: 0.9 MG/DL (ref 0.6–1.1)
CV STRESS CURRENT BP HE: NORMAL
CV STRESS CURRENT HR HE: 58
CV STRESS CURRENT HR HE: 58
CV STRESS CURRENT HR HE: 68
CV STRESS CURRENT HR HE: 70
CV STRESS CURRENT HR HE: 71
CV STRESS CURRENT HR HE: 75
CV STRESS CURRENT HR HE: 76
CV STRESS CURRENT HR HE: 77
CV STRESS CURRENT HR HE: 77
CV STRESS CURRENT HR HE: 78
CV STRESS CURRENT HR HE: 78
CV STRESS DEVIATION TIME HE: NORMAL
CV STRESS ECHO PERCENT HR HE: NORMAL
CV STRESS EXERCISE STAGE HE: NORMAL
CV STRESS FINAL RESTING BP HE: NORMAL
CV STRESS FINAL RESTING HR HE: 68
CV STRESS MAX HR HE: 78
CV STRESS MAX TREADMILL GRADE HE: 0
CV STRESS MAX TREADMILL SPEED HE: 0
CV STRESS PEAK DIA BP HE: NORMAL
CV STRESS PEAK SYS BP HE: NORMAL
CV STRESS PHASE HE: NORMAL
CV STRESS PROTOCOL HE: NORMAL
CV STRESS RESTING PT POSITION HE: NORMAL
CV STRESS ST DEVIATION AMOUNT HE: NORMAL
CV STRESS ST DEVIATION ELEVATION HE: NORMAL
CV STRESS ST EVELATION AMOUNT HE: NORMAL
CV STRESS TEST TYPE HE: NORMAL
CV STRESS TOTAL STAGE TIME MIN 1 HE: NORMAL
GFR SERPL CREATININE-BSD FRML MDRD: 68 ML/MIN/1.73M2
GLUCOSE BLD-MCNC: 160 MG/DL (ref 70–125)
GLUCOSE BLDC GLUCOMTR-MCNC: 151 MG/DL (ref 70–99)
GLUCOSE BLDC GLUCOMTR-MCNC: 155 MG/DL (ref 70–99)
GLUCOSE BLDC GLUCOMTR-MCNC: 178 MG/DL (ref 70–99)
GLUCOSE BLDC GLUCOMTR-MCNC: 186 MG/DL (ref 70–99)
GLUCOSE BLDC GLUCOMTR-MCNC: 191 MG/DL (ref 70–99)
GLUCOSE BLDC GLUCOMTR-MCNC: 208 MG/DL (ref 70–99)
GLUCOSE BLDC GLUCOMTR-MCNC: 42 MG/DL (ref 70–99)
GLUCOSE BLDC GLUCOMTR-MCNC: 77 MG/DL (ref 70–99)
GLUCOSE BLDC GLUCOMTR-MCNC: 88 MG/DL (ref 70–99)
NUC STRESS EJECTION FRACTION: 47 %
POTASSIUM BLD-SCNC: 3.6 MMOL/L (ref 3.5–5)
RATE PRESSURE PRODUCT: 7956
SODIUM SERPL-SCNC: 136 MMOL/L (ref 136–145)
STRESS ECHO BASELINE DIASTOLIC HE: 74
STRESS ECHO BASELINE HR: 58
STRESS ECHO BASELINE SYSTOLIC BP: 144
STRESS ECHO CALCULATED PERCENT HR: 52 %
STRESS ECHO LAST STRESS DIASTOLIC BP: 57
STRESS ECHO LAST STRESS HR: 77
STRESS ECHO LAST STRESS SYSTOLIC BP: 102
STRESS ECHO TARGET HR: 149

## 2022-09-15 PROCEDURE — 93016 CV STRESS TEST SUPVJ ONLY: CPT | Performed by: INTERNAL MEDICINE

## 2022-09-15 PROCEDURE — 99232 SBSQ HOSP IP/OBS MODERATE 35: CPT | Performed by: INTERNAL MEDICINE

## 2022-09-15 PROCEDURE — 258N000001 HC RX 258: Performed by: INTERNAL MEDICINE

## 2022-09-15 PROCEDURE — 78452 HT MUSCLE IMAGE SPECT MULT: CPT

## 2022-09-15 PROCEDURE — 210N000001 HC R&B IMCU HEART CARE

## 2022-09-15 PROCEDURE — 36415 COLL VENOUS BLD VENIPUNCTURE: CPT | Performed by: INTERNAL MEDICINE

## 2022-09-15 PROCEDURE — 93018 CV STRESS TEST I&R ONLY: CPT | Performed by: GENERAL ACUTE CARE HOSPITAL

## 2022-09-15 PROCEDURE — 250N000013 HC RX MED GY IP 250 OP 250 PS 637: Performed by: INTERNAL MEDICINE

## 2022-09-15 PROCEDURE — 78452 HT MUSCLE IMAGE SPECT MULT: CPT | Mod: 26 | Performed by: GENERAL ACUTE CARE HOSPITAL

## 2022-09-15 PROCEDURE — A9500 TC99M SESTAMIBI: HCPCS | Performed by: HOSPITALIST

## 2022-09-15 PROCEDURE — 99232 SBSQ HOSP IP/OBS MODERATE 35: CPT | Performed by: HOSPITALIST

## 2022-09-15 PROCEDURE — 343N000001 HC RX 343: Performed by: HOSPITALIST

## 2022-09-15 PROCEDURE — 258N000003 HC RX IP 258 OP 636: Performed by: INTERNAL MEDICINE

## 2022-09-15 PROCEDURE — 93017 CV STRESS TEST TRACING ONLY: CPT

## 2022-09-15 PROCEDURE — 80048 BASIC METABOLIC PNL TOTAL CA: CPT | Performed by: INTERNAL MEDICINE

## 2022-09-15 PROCEDURE — 250N000011 HC RX IP 250 OP 636: Performed by: HOSPITALIST

## 2022-09-15 RX ORDER — REGADENOSON 0.08 MG/ML
0.4 INJECTION, SOLUTION INTRAVENOUS ONCE
Status: COMPLETED | OUTPATIENT
Start: 2022-09-15 | End: 2022-09-15

## 2022-09-15 RX ORDER — AMINOPHYLLINE 25 MG/ML
50 INJECTION, SOLUTION INTRAVENOUS
Status: ACTIVE | OUTPATIENT
Start: 2022-09-15 | End: 2022-09-15

## 2022-09-15 RX ORDER — LIDOCAINE 40 MG/G
CREAM TOPICAL
Status: DISCONTINUED | OUTPATIENT
Start: 2022-09-15 | End: 2022-09-16 | Stop reason: HOSPADM

## 2022-09-15 RX ADMIN — Medication 15 G: at 02:41

## 2022-09-15 RX ADMIN — ROSUVASTATIN CALCIUM 40 MG: 40 TABLET, FILM COATED ORAL at 08:28

## 2022-09-15 RX ADMIN — AMINOPHYLLINE 50 MG: 25 INJECTION, SOLUTION INTRAVENOUS at 13:21

## 2022-09-15 RX ADMIN — ACETAMINOPHEN 975 MG: 325 TABLET, FILM COATED ORAL at 08:40

## 2022-09-15 RX ADMIN — Medication 8.8 MILLICURIE: at 12:14

## 2022-09-15 RX ADMIN — LIDOCAINE: 50 OINTMENT TOPICAL at 08:42

## 2022-09-15 RX ADMIN — AMLODIPINE BESYLATE 5 MG: 5 TABLET ORAL at 20:19

## 2022-09-15 RX ADMIN — SODIUM CHLORIDE: 9 INJECTION, SOLUTION INTRAVENOUS at 01:11

## 2022-09-15 RX ADMIN — ASPIRIN 81 MG: 81 TABLET ORAL at 08:41

## 2022-09-15 RX ADMIN — ACETAMINOPHEN 975 MG: 325 TABLET, FILM COATED ORAL at 14:21

## 2022-09-15 RX ADMIN — Medication 30.9 MILLICURIE: at 13:37

## 2022-09-15 RX ADMIN — METOPROLOL SUCCINATE 200 MG: 100 TABLET, EXTENDED RELEASE ORAL at 08:42

## 2022-09-15 RX ADMIN — DEXTROSE MONOHYDRATE 25 ML: 25 INJECTION, SOLUTION INTRAVENOUS at 03:11

## 2022-09-15 RX ADMIN — PANTOPRAZOLE SODIUM 20 MG: 20 TABLET, DELAYED RELEASE ORAL at 08:28

## 2022-09-15 RX ADMIN — ACETAMINOPHEN 975 MG: 325 TABLET, FILM COATED ORAL at 20:19

## 2022-09-15 RX ADMIN — ISOSORBIDE MONONITRATE 60 MG: 60 TABLET, EXTENDED RELEASE ORAL at 08:28

## 2022-09-15 RX ADMIN — ACETAMINOPHEN 975 MG: 325 TABLET, FILM COATED ORAL at 01:15

## 2022-09-15 RX ADMIN — PANTOPRAZOLE SODIUM 20 MG: 20 TABLET, DELAYED RELEASE ORAL at 17:12

## 2022-09-15 RX ADMIN — Medication 30 G: at 02:20

## 2022-09-15 RX ADMIN — INSULIN ASPART 2 UNITS: 100 INJECTION, SOLUTION INTRAVENOUS; SUBCUTANEOUS at 18:49

## 2022-09-15 RX ADMIN — REGADENOSON 0.4 MG: 0.08 INJECTION, SOLUTION INTRAVENOUS at 13:17

## 2022-09-15 ASSESSMENT — ACTIVITIES OF DAILY LIVING (ADL)
ADLS_ACUITY_SCORE: 27
ADLS_ACUITY_SCORE: 27
ADLS_ACUITY_SCORE: 26
ADLS_ACUITY_SCORE: 27
ADLS_ACUITY_SCORE: 26
ADLS_ACUITY_SCORE: 26
ADLS_ACUITY_SCORE: 27
ADLS_ACUITY_SCORE: 26
ADLS_ACUITY_SCORE: 27

## 2022-09-15 NOTE — PROGRESS NOTES
[facility] Progress Note    Assessment and recommendations:  71 yo woman w CAD, cardiomyopathy, and superior mesenteric artery dissection who presents for epigastric pain.     Planning for EGD after stress test is completed.     Approximately 10 minutes of total time was spent providing patient care including patient evaluation, reviewing documentation/test results, and .                                                 Blair De La Cruz MD  Thank you for the opportunity to participate in the care of this patient.   Please feel free to call me with any questions or concerns.  Phone number [phone] or if after 5PM [phone].              ---      Subjective:  Feeling well. Still having mild epigastric pain, early satiety. 4 point ROS otherwise neg.     Objective:  Vital signs in last 24 hours  Temp:  [97.7  F (36.5  C)-98.3  F (36.8  C)] 97.8  F (36.6  C)  Pulse:  [53-65] 65  Resp:  [18-22] 20  BP: (109-138)/(55-63) 136/63  SpO2:  [94 %-100 %] 98 % O2 Device: None (Room air)      Gen: A&Ox3, NAD  Eyes: No icterus  Pulmonary: CTA bilaterally  Cardiovascular: RRR,S1, S2  Gastrointestinal: Soft, NTTP, BS+, no rebound or guarding  Extrem: PPI, no c/c/e      LABORATORY    ELECTROLYTE PANEL   Recent Labs   Lab 09/15/22  1415 09/15/22  0809 09/15/22  0528 09/15/22  0453 09/12/22  0847 09/12/22  0456 09/11/22  1206 09/11/22  1133   NA  --   --   --  136  --  136  --  134*   POTASSIUM  --   --   --  3.6  --  4.2  --  5.0   CHLORIDE  --   --   --  110*  --  108*  --  106   CO2  --   --   --  21*  --  23  --  22   * 191* 186* 160*   < > 125   < > 277*   CR  --   --   --  0.90  --  1.24*  --  1.14*   BUN  --   --   --  16  --  17  --  14    < > = values in this interval not displayed.      HEMATOLOGY PANEL   Recent Labs   Lab 09/10/22  0850 09/09/22  0805 09/08/22  1609   HGB 11.1* 11.1* 11.9   MCV 93 93 94   WBC 4.6 4.8 6.7    184 200      LIVER AND PANCREAS PANEL   Recent Labs   Lab  09/09/22  0805 09/08/22  1609   AST 12 13   ALT 10 14   ALKPHOS 52 60   BILITOTAL 0.5 0.7   LIPASE  --  <9     IMAGING STUDIES    [unfilled]    I have reviewed the current diagnostic and laboratory tests.

## 2022-09-15 NOTE — PROGRESS NOTES
CLINICAL NUTRITION SERVICES    Reviewed nutrition risk factors due to LOS. Pt is tolerating diet, eating well per nursing documentation. No nutrition issues identified at this time. RD will follow via rounds at this time, unless consulted. Pt was previously eating 100% of meals recorded prior to NPO. NPO for possible EGD in am

## 2022-09-15 NOTE — PLAN OF CARE
"  Problem: Plan of Care - These are the overarching goals to be used throughout the patient stay.    Goal: Plan of Care Review/Shift Note  Description: The Plan of Care Review/Shift note should be completed every shift.  The Outcome Evaluation is a brief statement about your assessment that the patient is improving, declining, or no change.  This information will be displayed automatically on your shift note.  Outcome: Ongoing, Progressing  Goal: Patient-Specific Goal (Individualized)  Description: You can add care plan individualizations to a care plan. Examples of Individualization might be:  \"Parent requests to be called daily at 9am for status\", \"I have a hard time hearing out of my right ear\", or \"Do not touch me to wake me up as it startles me\".  Outcome: Ongoing, Progressing  Goal: Absence of Hospital-Acquired Illness or Injury  Outcome: Ongoing, Progressing  Intervention: Identify and Manage Fall Risk  Recent Flowsheet Documentation  Taken 9/14/2022 1600 by Raquel Valles RN  Safety Promotion/Fall Prevention:    activity supervised    fall prevention program maintained    increased rounding and observation    clutter free environment maintained    nonskid shoes/slippers when out of bed    toileting scheduled  Taken 9/14/2022 1230 by Raquel Valles RN  Safety Promotion/Fall Prevention:    activity supervised    fall prevention program maintained    increased rounding and observation    clutter free environment maintained    nonskid shoes/slippers when out of bed    toileting scheduled  Taken 9/14/2022 0845 by Raquel Valles, RN  Safety Promotion/Fall Prevention:    activity supervised    fall prevention program maintained    increased rounding and observation    clutter free environment maintained    nonskid shoes/slippers when out of bed    toileting scheduled  Intervention: Prevent Skin Injury  Recent Flowsheet Documentation  Taken 9/14/2022 1600 by Raquel Valles, RN  Body Position: position " changed independently  Taken 9/14/2022 1230 by Raquel Valles RN  Body Position: position changed independently  Taken 9/14/2022 0845 by Raquel Valles RN  Body Position: position changed independently  Intervention: Prevent and Manage VTE (Venous Thromboembolism) Risk  Recent Flowsheet Documentation  Taken 9/14/2022 1600 by Raquel Valles RN  VTE Prevention/Management: (on ASA and plavix) other (see comments)  Activity Management: activity adjusted per tolerance  Taken 9/14/2022 1230 by Raquel Valles RN  VTE Prevention/Management: (on ASA and plavix) other (see comments)  Activity Management: activity adjusted per tolerance  Taken 9/14/2022 0845 by Raquel Valles RN  VTE Prevention/Management: (on ASA and plavix) other (see comments)  Activity Management:    ambulated to bathroom    ambulated in room    activity adjusted per tolerance  Goal: Optimal Comfort and Wellbeing  Outcome: Ongoing, Progressing  Intervention: Monitor Pain and Promote Comfort  Recent Flowsheet Documentation  Taken 9/14/2022 1600 by Raquel Valles RN  Pain Management Interventions:    care clustered    medication (see MAR)  Taken 9/14/2022 1441 by Raquel Valles RN  Pain Management Interventions:    care clustered    medication (see MAR)  Taken 9/14/2022 1230 by Raquel Valles RN  Pain Management Interventions:    care clustered    medication (see MAR)  Taken 9/14/2022 1000 by Raquel Valles RN  Pain Management Interventions:    care clustered    medication (see MAR)  Taken 9/14/2022 0948 by Raquel Valles RN  Pain Management Interventions:    care clustered    medication (see MAR)  Taken 9/14/2022 0845 by Raquel Valles RN  Pain Management Interventions:    care clustered    medication (see MAR)  Goal: Readiness for Transition of Care  Outcome: Ongoing, Progressing     Problem: Risk for Delirium  Goal: Optimal Coping  Outcome: Ongoing, Progressing  Goal: Improved Behavioral Control  Outcome: Ongoing,  Progressing  Goal: Improved Attention and Thought Clarity  Outcome: Ongoing, Progressing  Intervention: Maximize Cognitive Function  Recent Flowsheet Documentation  Taken 9/14/2022 1600 by Raquel Valles RN  Sensory Stimulation Regulation:    care clustered    lighting decreased  Reorientation Measures: glasses use encouraged  Taken 9/14/2022 1230 by Raquel Valles RN  Sensory Stimulation Regulation:    care clustered    lighting decreased  Reorientation Measures: glasses use encouraged  Taken 9/14/2022 0845 by Raquel Valles RN  Sensory Stimulation Regulation:    care clustered    lighting decreased  Reorientation Measures: glasses use encouraged  Goal: Improved Sleep  Outcome: Ongoing, Progressing     Problem: Pain Acute  Goal: Acceptable Pain Control and Functional Ability  Outcome: Ongoing, Progressing  Intervention: Develop Pain Management Plan  Recent Flowsheet Documentation  Taken 9/14/2022 1600 by Raquel Valles RN  Pain Management Interventions:    care clustered    medication (see MAR)  Taken 9/14/2022 1441 by Raquel Valles RN  Pain Management Interventions:    care clustered    medication (see MAR)  Taken 9/14/2022 1230 by Raquel Valles RN  Pain Management Interventions:    care clustered    medication (see MAR)  Taken 9/14/2022 1000 by Raquel Valles RN  Pain Management Interventions:    care clustered    medication (see MAR)  Taken 9/14/2022 0948 by Raquel Valles RN  Pain Management Interventions:    care clustered    medication (see MAR)  Taken 9/14/2022 0845 by Raquel Valles RN  Pain Management Interventions:    care clustered    medication (see MAR)  Intervention: Prevent or Manage Pain  Recent Flowsheet Documentation  Taken 9/14/2022 1600 by Raquel Valles RN  Sensory Stimulation Regulation:    care clustered    lighting decreased  Medication Review/Management: medications reviewed  Taken 9/14/2022 1230 by Raquel Valles RN  Sensory Stimulation Regulation:     care clustered    lighting decreased  Medication Review/Management: medications reviewed  Taken 9/14/2022 0845 by Raquel Valles RN  Sensory Stimulation Regulation:    care clustered    lighting decreased  Medication Review/Management: medications reviewed     Problem: Cardiac Output Decreased (Pulmonary Hypertension)  Goal: Effective Cardiac Output  Outcome: Ongoing, Progressing  Intervention: Optimize Cardiac Output  Recent Flowsheet Documentation  Taken 9/14/2022 1600 by Raquel Valles RN  Head of Bed (HOB) Positioning: HOB at 30 degrees  Taken 9/14/2022 1230 by Raquel Vallse RN  Head of Bed (HOB) Positioning: HOB at 30 degrees  Taken 9/14/2022 0845 by Raquel Valles RN  Head of Bed (HOB) Positioning: HOB at 30 degrees   Goal Outcome Evaluation:     Patient stable. NPO for possible EGD in AM. EKG completed. Echo completed. Stress test tomorrow at 1pm. No caffeine for 4 hours, NPO for 4 hours. Patient had another episode of extreme epigatric pain that she rated as a 6 out of 10. B/p was elevated. Labetol X1 was given and tolerated well. B/p rechecked and stable. PM nurse notified.

## 2022-09-15 NOTE — PROGRESS NOTES
CARDIOLOGY PROGRESS NOTE      Assessment/Plan:  1.  Chest Pain and shortness of breath-atypical, stabbing, onset at rest.  Relieved after 30 minutes. Enzymes normal as is ECG.  Doubt coronary ischemia but given extensive past history we will arrange for pharmacological stress nuclear given her inability to walk.  If medium or large sized area of ischemia will then pursue angiography.  2.  Cardiomyopathy-ejection fraction in past 40%, on our echo 40 to 45% with wall motion abnormality involving posterior and lateral walls.  On metoprolol and ACE inhibitor.  No need for ICD.  3.  Coronary artery disease-angiography in 2018 apparently showed normal left main, significant mid LAD stenosis that received a stent, significant stenosis of diagonal 1 and diagonal 2 that received stents, circumflex was normal and the obtuse marginal artery had significant disease requiring a stent, and the right coronary artery distal disease required a stent.  Given this three-vessel disease pharmacological stress nuclear as above.  3.  Abdominal pain, epigastric-felt to be due to ischemia involving superior mesenteric artery.  Will try some nitroglycerin patch given continued discomfort.  5.  Diabetes mellitus (H)-so noted with significantly elevated hemoglobin A1c of 10.0.  Defer to primary care.  6.  Hypertension- under good control.  7.  Hyperlipidemia LDL goal <100-so noted.  LP(a) significantly elevated to 223.  Eventually would like to add niacin but consider for one of her research protocols.  Total cholesterol good at 121 with an LDL of 66 on rosuvastatin.      Plan:  1.  Pharmacological stress nuclear today, if significantly abnormal will need angiography.  2.  When able might consider adding niacin or research protocol to bring down LP(a).    Discharge Plannin.  Barrier to discharge is resolution of symptoms from bowel ischemia.  2.  Can follow with cardiology at [facility].     LOS: 7 days     Subjective:  Interval  "History:    72-year-old white female being seen on eighth day of hospitalization.  She still has abdominal discomfort, no problems with appetite, nausea, vomiting or bowels.  She denies any significant chest discomfort, palpitations, shortness of breath, PND, orthopnea or peripheral edema.  Does have a little orthostatic lightheadedness.    Medications    acetaminophen  975 mg Oral Q6H     amLODIPine  5 mg Oral QPM     aspirin  81 mg Oral Daily     insulin aspart  1-6 Units Subcutaneous Q4H     [Held by provider] insulin aspart   Subcutaneous Daily with breakfast     [Held by provider] insulin aspart   Subcutaneous Daily with lunch     [Held by provider] insulin aspart   Subcutaneous Daily with supper     [Held by provider] insulin aspart  1-3 Units Subcutaneous TID AC     [Held by provider] insulin aspart  1-3 Units Subcutaneous At Bedtime     insulin glargine  18 Units Subcutaneous At Bedtime     isosorbide mononitrate  60 mg Oral Daily     lidocaine   Topical 4x Daily     [Held by provider] lisinopril  40 mg Oral Daily     metoprolol succinate ER  200 mg Oral Daily     pantoprazole  20 mg Oral BID AC     polyethylene glycol  17 g Oral Daily     rosuvastatin  40 mg Oral Daily     sodium chloride (PF)  10 mL Intravenous Once     sodium chloride (PF)  3 mL Intracatheter Q8H     sodium chloride (PF)  3 mL Intracatheter Q8H     Objective:   Vital signs in last 24 hours:  Temp:  [97.4  F (36.3  C)-98.3  F (36.8  C)] 97.8  F (36.6  C)  Pulse:  [53-65] 65  Resp:  [18-22] 20  BP: (109-147)/(55-65) 136/63  SpO2:  [93 %-100 %] 98 %    Physical Exam:  /63 (BP Location: Left arm)   Pulse 65   Temp 97.8  F (36.6  C) (Oral)   Resp 20   Ht 1.6 m (5' 3\")   Wt 80.9 kg (178 lb 6.4 oz)   SpO2 98%   BMI 31.60 kg/m      General Appearance:    Alert, cooperative, no distress, appears stated age   Head:    Normocephalic, without obvious abnormality, atraumatic   Throat:   Lips, mucosa, and tongue normal; teeth and gums " normal   Neck:   Supple, symmetrical, trachea midline, no adenopathy;        thyroid:  No enlargement/tenderness/nodules; no carotid    bruit or JVD   Back:     Symmetric, no curvature, ROM normal, no CVA tenderness   Lungs:     Clear to auscultation bilaterally, respirations unlabored   Chest wall:    No tenderness or deformity   Heart:    Regular rate and rhythm, S1 and S2 normal, no murmur, rub   or gallop   Abdomen:     Soft, non-tender, bowel sounds active all four quadrants,     no masses, no organomegaly   Extremities:   Normal, atraumatic, no cyanosis or edema   Pulses:   2+ and symmetric all extremities   Skin:   Skin color, texture, turgor normal, no rashes or lesions     Cardiographics:      ECG: Personally reviewed by myself shows sinus rhythm, leftward axis, possible old inferior Q wave pattern, occasional PVC.    Echocardiogram:   1. The left ventricle is normal in size. Left ventricular systolic performance is mild to moderately reduced. The ejection fraction is estimated to be 40-45%.  2. There is moderate-severe posterior and basal and mid lateral hypokinesis. There is moderate basal and mid anterior hypokinesis.  3. There is mild aortic insufficiency.  4. There is mild, to perhaps mild-moderate, mitral insufficiency.  5. Normal right ventricular size and systolic performance.  6. There is moderate left atrial enlargement.      Lab Results:   Recent Labs   Lab 09/10/22  0850   WBC 4.6   HGB 11.1*   HCT 34.3*        Recent Labs   Lab 09/15/22  0453      CO2 21*   BUN 16   .       Lab Results   Component Value Date    TROPONINI 0.02 09/14/2022

## 2022-09-15 NOTE — PLAN OF CARE
Goal Outcome Evaluation:    Plan of Care Reviewed With: patient     Overall Patient Progress: no change    Outcome Evaluation: Patient rates epigastric pain 5/10 and says that is tolerable. Stress test done today. Plan for possible EGD tomorrow.

## 2022-09-15 NOTE — PROGRESS NOTES
[facility] ACUTE PAIN SERVICE    ([facility], [facility], [facility])  Daily PAIN Progress Note     Assessment/Plan:  Bettina Guerrero is a 72 year old female who was admitted on 9/8/2022.  Pain team was asked to see the patient for uncontrolled pain with SMA subacute dissection. . Admitted for syncope, and reported diarrhea x 2 days, abdominal pain and nausea, shortness of breath and pain between her shoulder blades. Patient reporting abdominal pain x 4 years. History including CKD3 , COPD, HLD, HTN, NIDDM, CAD and ischemic cardiomyopathy. Vascular Surgery consulted, per note CTA showing abnormal low density filling defects sen in the proximal aspect of the SMA, no surgical intervention indicated. Diet is advance diet.  The patient does not smoke and denies chemical dependency history.      Opioid Induced Respiratory Depression Risk Assessment:High, age, COPD, CKD,  Cardiomyopathy.?     Noted allergies/intolerance-oxycodone-itching, tramadol-diarrhea      Patient reporting pain relief with APAP and lidocaine. Cardiology and GI evaluating patient for chest/epigastric pain. No indication for opioids. No further recommendations at this time for pain, pain team will sign off, please re-consult should any new issues arise.      PLAN:   1) Pain is consistent with abddominal pain that has been present for last 4 years and per vascular surgery Low likelihood that her abdominal pain that has been present for last 4 years is related to short segment non flow limiting dissection of SMA  2)Multimodal Medication Therapy  Topical: lidocaine ointment QID to chest/abdomen/back  NSAID'S: CrCl 57 ml/min. No NSAIDs due to renal function and cardiomyopathy.  Muscle Relaxants: none  Adjuvants:  Tylenol 975 mg po QID, NTG s/l prn for chest pain   Antidepressants/anxiolytics: none, melatonin prn sleep  Opioids: none  IV Pain medication: none  3)Non-medication interventions: per nursing  Acupuncture consult - if  patient agreeable  Integrative consult - if patient agreeable  4)Constipation Prophylaxis:  Miralax dailly  5) Care Teams: [facility], Vascular Surgery  -Opioid prescriber has been none  -MN  pulled from system on 09/13/22. This indicates patient not prescribed opioids in time period of last year.   Discharge Recommendations - We recommend prescribing the following at the time of discharge: lidocaine and APAP prn if effective    Suzanne Cardoza, PharmD, BCPS, CPE  Acute Care Pain Management Program   Hours of pain coverage 7a-1700- after 1700 please call the house officer   [facility] (Mati MERCEDES, [place])   [phone]

## 2022-09-16 ENCOUNTER — APPOINTMENT (OUTPATIENT)
Dept: RADIOLOGY | Facility: HOSPITAL | Age: 72
DRG: 300 | End: 2022-09-16
Attending: INTERNAL MEDICINE
Payer: COMMERCIAL

## 2022-09-16 LAB
GLUCOSE BLDC GLUCOMTR-MCNC: 149 MG/DL (ref 70–99)
GLUCOSE BLDC GLUCOMTR-MCNC: 156 MG/DL (ref 70–99)
GLUCOSE BLDC GLUCOMTR-MCNC: 160 MG/DL (ref 70–99)
GLUCOSE BLDC GLUCOMTR-MCNC: 166 MG/DL (ref 70–99)
GLUCOSE BLDC GLUCOMTR-MCNC: 167 MG/DL (ref 70–99)
GLUCOSE BLDC GLUCOMTR-MCNC: 172 MG/DL (ref 70–99)
GLUCOSE BLDC GLUCOMTR-MCNC: 191 MG/DL (ref 70–99)
UPPER GI ENDOSCOPY: NORMAL

## 2022-09-16 PROCEDURE — 88305 TISSUE EXAM BY PATHOLOGIST: CPT | Mod: TC | Performed by: INTERNAL MEDICINE

## 2022-09-16 PROCEDURE — G0500 MOD SEDAT ENDO SERVICE >5YRS: HCPCS | Performed by: INTERNAL MEDICINE

## 2022-09-16 PROCEDURE — 250N000013 HC RX MED GY IP 250 OP 250 PS 637: Performed by: INTERNAL MEDICINE

## 2022-09-16 PROCEDURE — 210N000001 HC R&B IMCU HEART CARE

## 2022-09-16 PROCEDURE — 99232 SBSQ HOSP IP/OBS MODERATE 35: CPT | Performed by: HOSPITALIST

## 2022-09-16 PROCEDURE — 0DB68ZX EXCISION OF STOMACH, VIA NATURAL OR ARTIFICIAL OPENING ENDOSCOPIC, DIAGNOSTIC: ICD-10-PCS | Performed by: HOSPITALIST

## 2022-09-16 PROCEDURE — 250N000011 HC RX IP 250 OP 636: Performed by: INTERNAL MEDICINE

## 2022-09-16 PROCEDURE — 99232 SBSQ HOSP IP/OBS MODERATE 35: CPT | Performed by: INTERNAL MEDICINE

## 2022-09-16 PROCEDURE — 43235 EGD DIAGNOSTIC BRUSH WASH: CPT | Performed by: INTERNAL MEDICINE

## 2022-09-16 PROCEDURE — 74240 X-RAY XM UPR GI TRC 1CNTRST: CPT

## 2022-09-16 PROCEDURE — 250N000013 HC RX MED GY IP 250 OP 250 PS 637: Performed by: HOSPITALIST

## 2022-09-16 RX ORDER — METFORMIN HCL 500 MG
500 TABLET, EXTENDED RELEASE 24 HR ORAL 2 TIMES DAILY WITH MEALS
Qty: 60 TABLET | Refills: 1 | Status: SHIPPED | OUTPATIENT
Start: 2022-09-16

## 2022-09-16 RX ORDER — AMLODIPINE BESYLATE 5 MG/1
10 TABLET ORAL EVERY EVENING
Status: DISCONTINUED | OUTPATIENT
Start: 2022-09-16 | End: 2022-09-17 | Stop reason: HOSPADM

## 2022-09-16 RX ORDER — OMEPRAZOLE 40 MG/1
40 CAPSULE, DELAYED RELEASE ORAL 2 TIMES DAILY
Qty: 60 CAPSULE | Refills: 0 | Status: SHIPPED | OUTPATIENT
Start: 2022-09-16 | End: 2022-10-16

## 2022-09-16 RX ORDER — AMLODIPINE BESYLATE 5 MG/1
10 TABLET ORAL EVERY EVENING
Qty: 30 TABLET | Refills: 3 | Status: SHIPPED | OUTPATIENT
Start: 2022-09-16 | End: 2022-09-17

## 2022-09-16 RX ORDER — AMLODIPINE BESYLATE 5 MG/1
5 TABLET ORAL EVERY EVENING
Qty: 30 TABLET | Refills: 3 | Status: SHIPPED | OUTPATIENT
Start: 2022-09-16 | End: 2022-09-16

## 2022-09-16 RX ORDER — FENTANYL CITRATE 50 UG/ML
INJECTION, SOLUTION INTRAMUSCULAR; INTRAVENOUS PRN
Status: COMPLETED | OUTPATIENT
Start: 2022-09-16 | End: 2022-09-16

## 2022-09-16 RX ADMIN — ROSUVASTATIN CALCIUM 40 MG: 40 TABLET, FILM COATED ORAL at 12:52

## 2022-09-16 RX ADMIN — ASPIRIN 81 MG: 81 TABLET ORAL at 12:51

## 2022-09-16 RX ADMIN — METOPROLOL SUCCINATE 200 MG: 100 TABLET, EXTENDED RELEASE ORAL at 12:52

## 2022-09-16 RX ADMIN — HYDRALAZINE HYDROCHLORIDE 10 MG: 20 INJECTION, SOLUTION INTRAMUSCULAR; INTRAVENOUS at 00:26

## 2022-09-16 RX ADMIN — ACETAMINOPHEN 975 MG: 325 TABLET, FILM COATED ORAL at 20:04

## 2022-09-16 RX ADMIN — INSULIN ASPART 2 UNITS: 100 INJECTION, SOLUTION INTRAVENOUS; SUBCUTANEOUS at 14:15

## 2022-09-16 RX ADMIN — DIATRIZOATE MEGLUMINE AND DIATRIZOATE SODIUM 1 ML: 660; 100 SOLUTION ORAL; RECTAL at 12:14

## 2022-09-16 RX ADMIN — MIDAZOLAM 2 MG: 1 INJECTION INTRAMUSCULAR; INTRAVENOUS at 09:08

## 2022-09-16 RX ADMIN — PANTOPRAZOLE SODIUM 20 MG: 20 TABLET, DELAYED RELEASE ORAL at 06:45

## 2022-09-16 RX ADMIN — INSULIN ASPART 1 UNITS: 100 INJECTION, SOLUTION INTRAVENOUS; SUBCUTANEOUS at 12:53

## 2022-09-16 RX ADMIN — INSULIN ASPART 1 UNITS: 100 INJECTION, SOLUTION INTRAVENOUS; SUBCUTANEOUS at 17:35

## 2022-09-16 RX ADMIN — MIDAZOLAM 1 MG: 1 INJECTION INTRAMUSCULAR; INTRAVENOUS at 09:11

## 2022-09-16 RX ADMIN — PANTOPRAZOLE SODIUM 20 MG: 20 TABLET, DELAYED RELEASE ORAL at 17:34

## 2022-09-16 RX ADMIN — ISOSORBIDE MONONITRATE 60 MG: 60 TABLET, EXTENDED RELEASE ORAL at 12:51

## 2022-09-16 RX ADMIN — FENTANYL CITRATE 50 MCG: 50 INJECTION, SOLUTION INTRAMUSCULAR; INTRAVENOUS at 09:10

## 2022-09-16 RX ADMIN — ACETAMINOPHEN 975 MG: 325 TABLET, FILM COATED ORAL at 01:50

## 2022-09-16 RX ADMIN — LIDOCAINE: 50 OINTMENT TOPICAL at 12:52

## 2022-09-16 RX ADMIN — FENTANYL CITRATE 50 MCG: 50 INJECTION, SOLUTION INTRAMUSCULAR; INTRAVENOUS at 09:08

## 2022-09-16 RX ADMIN — ACETAMINOPHEN 975 MG: 325 TABLET, FILM COATED ORAL at 12:51

## 2022-09-16 RX ADMIN — LIDOCAINE: 50 OINTMENT TOPICAL at 20:04

## 2022-09-16 RX ADMIN — LIDOCAINE: 50 OINTMENT TOPICAL at 17:34

## 2022-09-16 RX ADMIN — AMLODIPINE BESYLATE 10 MG: 5 TABLET ORAL at 20:04

## 2022-09-16 ASSESSMENT — ACTIVITIES OF DAILY LIVING (ADL)
ADLS_ACUITY_SCORE: 24
ADLS_ACUITY_SCORE: 26
ADLS_ACUITY_SCORE: 24
ADLS_ACUITY_SCORE: 26
ADLS_ACUITY_SCORE: 24
ADLS_ACUITY_SCORE: 26
ADLS_ACUITY_SCORE: 26
ADLS_ACUITY_SCORE: 30
ADLS_ACUITY_SCORE: 28
ADLS_ACUITY_SCORE: 24
ADLS_ACUITY_SCORE: 26
ADLS_ACUITY_SCORE: 24

## 2022-09-16 NOTE — DISCHARGE SUMMARY
[facility] MEDICINE  DISCHARGE SUMMARY     Primary Care Physician: Angie Newsome  Admission Date: 9/8/2022   Discharge Provider: Erwin Shaffer MD Discharge Date: 9/17/2022   Diet:   Active Diet and Nourishment Order   Procedures     Consistent Carbohydrate Diet Moderate Consistent Carb (60 g CHO per Meal) Diet     Diet       Code Status: Full Code   Activity: DCACTIVITY: Activity as tolerated        Condition at Discharge: Stable     REASON FOR PRESENTATION(See Admission Note for Details)     Abdominal pain    PRINCIPAL & ACTIVE DISCHARGE DIAGNOSES     Principal Problem:    Abdominal pain, epigastric  Active Problems:    Diabetes mellitus (H)    Hypertension    Asthma in adult    Hyperlipidemia LDL goal <100    Diarrhea, unspecified type      PENDING LABS     Unresulted Labs Ordered in the Past 30 Days of this Admission     Date and Time Order Name Status Description    9/16/2022  9:18 AM Surgical Pathology Exam In process             PROCEDURES ( this hospitalization only)      Procedure(s):  ESOPHAGOGASTRODUODENOSCOPY (EGD) with gastric biopsies    RECOMMENDATIONS TO OUTPATIENT PROVIDER FOR F/U VISIT     Follow-up Appointments     Follow-up and recommended labs and tests      Follow up with primary care provider, [facility],   within 7 days for hospital follow- up.    Follow-up with your cardiologist as scheduled.               DISPOSITION     Home    SUMMARY OF HOSPITAL COURSE:      72F with poorly controlled DM2, HTN, COPD, HLD, multivessel CAD and ischemic cardiomyopathy with EF of 40%, CKD 3, admitted on 9/8/2022 with abdominal pain and found to have subacute arterial dissection of SMA.    Repeat CTA 9/12 showing tayo significant change.   Evaluated by vascular surgery and not thought to be the source of the pain.  Also had ischemic evaluation and upper endoscopy without source for pain.   There was some retained food in the stomach  which raises the question of diabetic gastroparesis and this could be assessed as outpatient with a gastric emptying study.  There was also come question of a gastric outlet obstruction on endoscopy but this was not confirmed on an upper GI series.     #Abdominal pain - consider outpatient gastric emptying study, though clinical picture not entirely c/w this without nausea and emesis.  PPI BID x 2 months then qday.  GI f/u    #Subacute arterial dissection of the SMA - asa, statin.  Goal SBP < 130, increased norvasc.  Mostly at goal.  Continue to titrate as outpatient.     #Multivessel CAD ischemic cardiomyopathy, EF 40% - no ischemia on nuc stress, just prior infarct.  Continue Imdur, aspirin, Toprol-XL.    #Incidental imaging findings:    -Indeterminate R adrenal nodule  -ovarian cyst  -R renal artery stenosis.    -Outpatient f/u for these    #Essential hypertension - toprol-xl, started norvasc and increased to 10mg, imdur lisinopril stopped for JESIKA     #Non-insulin-dependent diabetes mellitus - recent A1c 10.0  -continue metformin (increase to 500mg BID), resume glipizide  -likely to need 3rd agent but would titrate up metformin 1st.  Has significant deductible and would make sense to max out current agents first.  F/u with PCP for ongoing titration and consideration of insulin.     #RENEA/Stage III CKD  - Probably was pre-renal. Back to baseline now.       #GERD - Continue pantoprazole, dosing as above     #COPD - No exacerbation     #Hyponatremia -resolved          Discharge Medications with Med changes:     Current Discharge Medication List      START taking these medications    Details   amLODIPine (NORVASC) 10 MG tablet Take 1 tablet (10 mg) by mouth every evening  Qty: 30 tablet, Refills: 3    Associated Diagnoses: Primary hypertension         CONTINUE these medications which have CHANGED    Details   metFORMIN (GLUCOPHAGE XR) 500 MG 24 hr tablet Take 1 tablet (500 mg) by mouth 2 times daily (with meals)  Qty:  60 tablet, Refills: 1    Associated Diagnoses: Type 2 diabetes mellitus without complication, with long-term current use of insulin (H)      omeprazole (PRILOSEC) 40 MG DR capsule Take 1 capsule (40 mg) by mouth 2 times daily for 30 days Take 1 capsules twice daily for 2 months, then 1 capsule daily  Qty: 60 capsule, Refills: 0    Associated Diagnoses: Abdominal pain, epigastric         CONTINUE these medications which have NOT CHANGED    Details   Acetaminophen (ARTHRITIS PAIN RELIEF PO) Take 650-1,300 mg by mouth every 8 hours as needed Takes 2 tablets daily in am.Christine Escalante LPN 3:00 PM on 6/8/2018      albuterol (PROAIR HFA, PROVENTIL HFA, VENTOLIN HFA) 108 (90 BASE) MCG/ACT inhaler Inhale 2 puffs into the lungs every 6 hours as needed for shortness of breath / dyspnea or wheezing  Qty: 1 Inhaler, Refills: 1    Associated Diagnoses: Allergic rhinitis due to pollen      alendronate (FOSAMAX) 70 MG tablet Take 70 mg by mouth every 7 days Qweek on sunday      aspirin 81 MG EC tablet Take 81 mg by mouth daily      benzocaine-menthol (CEPACOL) 15-3.6 MG lozenge Place 1 lozenge inside cheek every 2 hours as needed for moderate pain      glipiZIDE (GLUCOTROL) 10 MG tablet Take 10 mg by mouth 2 times daily (before meals)      ipratropium - albuterol 0.5 mg/2.5 mg/3 mL (DUONEB) 0.5-2.5 (3) MG/3ML neb solution Take 1 vial by nebulization every 6 hours as needed for shortness of breath / dyspnea or wheezing      isosorbide mononitrate (IMDUR) 60 MG 24 hr tablet Take 60 mg by mouth daily      loratadine (CLARITIN) 10 MG tablet Take 10 mg by mouth daily as needed for allergies      metoprolol succinate ER (TOPROL XL) 200 MG 24 hr tablet Take 200 mg by mouth daily      nitroGLYcerin (NITROSTAT) 0.4 MG sublingual tablet Place 0.4 mg under the tongue every 5 minutes as needed for chest pain For chest pain place 1 tablet under the tongue every 5 minutes for 3 doses. If symptoms persist 5 minutes after 1st dose call 911.       rosuvastatin (CRESTOR) 40 MG tablet Take 40 mg by mouth daily      vitamin D3 (CHOLECALCIFEROL) 50 mcg (2000 units) tablet Take 1 tablet by mouth daily      blood glucose monitoring (NO BRAND SPECIFIED) meter device kit Use to test blood sugar 3 times daily or as directed.  Qty: 1 kit, Refills: 0    Associated Diagnoses: Type 2 diabetes mellitus without complication, with long-term current use of insulin (H)      COMFORT LANCETS MISC 1 each 3 times daily  Qty: 100 each, Refills: 3    Comments: Okay to substitute with other brand.  Associated Diagnoses: Type 2 diabetes mellitus without complication, with long-term current use of insulin (H)         STOP taking these medications       lisinopril (ZESTRIL) 40 MG tablet Comments:   Reason for Stopping:                     Rationale for medication changes:      Metformin increased for DM2  Lisinopril stopped for JESIKA      Consults     DIABETES EDUCATION IP CONSULT  NUTRITION SERVICES ADULT IP CONSULT  CARE MANAGEMENT / SOCIAL WORK IP CONSULT  PAIN MANAGEMENT ADULT IP CONSULT  GASTROENTEROLOGY IP CONSULT  CARDIOLOGY IP CONSULT  CARE MANAGEMENT / SOCIAL WORK IP CONSULT    Immunizations given this encounter     Most Recent Immunizations   Administered Date(s) Administered     Influenza (High Dose) 3 valent vaccine 09/25/2017     Influenza (IIV3) PF 10/01/2014     Pneumo Conj 13-V (2010&after) 04/27/2016     Pneumococcal 23 valent 08/17/2017     TDAP Vaccine (Boostrix) 04/27/2016     Tdap (Adacel,Boostrix) 02/28/2006           Anticoagulation Information            SIGNIFICANT IMAGING FINDINGS     Results for orders placed or performed during the hospital encounter of 09/08/22   CTA Chest Abdomen Pelvis w Contrast   Result Value Ref Range    Radiologist flags (AA)      Abnormal proximal SMA which could represent an ulcerative atheromatous plaque, small area of thrombus, or less likely localized dissection.    Impression    IMPRESSION:  1.  There is an abnormal low density  filling defects seen in the proximal aspect of the SMA just distal to the origin and to the left of midline measuring approximately 1.0 cm x 0.5 cm x 0.3 cm and findings could represent a partially ulcerated   atheromatous plaque, a localized area of thrombus, or less likely a localized dissection. The SMA is otherwise normal in appearance to include the distal branches.    2.  No evidence for bowel ischemia.    3.  Approximately 75% stenosis origin of the left renal artery.    4.  2.9 x 2.6 cm cyst right ovary, please refer to below reference for possible follow-up.    REFERENCE:  Management of Incidental Adnexal Findings on CT and MRI: A White Paper of the ACR Incidental Findings Committee. J Am Halie Radiol 2020; 17(2):248-254.    Postmenopausal or equal to or >50 years if status unknown:    Equal to or <3 cm: No further imaging.  >3 cm on CT: Ultrasound.  Equal to or <5 cm on MRI: No follow-up if fully characterized.  >3 cm on MRI: Ultrasound in 6-12 months if not fully characterized.  >5 cm on MRI: Ultrasound in 6-12 months even if fully characterized.      [Critical Result: Abnormal proximal SMA which could represent an ulcerative atheromatous plaque, small area of thrombus, or less likely localized dissection.]    Finding was identified on 9/8/2022 7:15PM.     Dr. Eduardo was contacted by me on 9/8/2022 7:25 PM and verbalized understanding of the critical result.        Head CT w/o contrast    Impression    IMPRESSION:  1.  No acute intracranial abnormalities identified.   CT Thoracic Spine w/o Contrast    Impression    IMPRESSION:  1.  No fracture or posttraumatic subluxation.  2.  No high-grade spinal canal or neural foraminal stenosis.     Cervical spine CT w/o contrast    Impression    IMPRESSION:  1.  No fracture or posttraumatic subluxation.  2.  No high-grade spinal canal or neural foraminal stenosis.   CTA Chest Abdomen Pelvis w Contrast    Impression    IMPRESSION:  1.  No significant change in the  appearance of the nonocclusive intraluminal thrombus or small dissection proximal superior mesenteric artery, suboptimally opacified due to technical issues.  2.  Indeterminate, enhancing right adrenal mass is unchanged. Considerations would include a lipid poor adenoma versus other neoplasm. Recommend follow-up noncontrast CT exam in 3 months for reevaluation.  3.  Stable right adnexal cyst. Please see guidelines described on previous report for further evaluation.  4.  Gas within the bladder presumed iatrogenic. Clinical correlation recommended.         XR Gastrografin Upper GI w KUB    Impression    IMPRESSION:    Normal upper GI.       Discharge Orders        Reason for your hospital stay    Abdominal pain     Follow-up and recommended labs and tests    Follow up with primary care provider, [facility], within 7 days for hospital follow- up.     Activity    Your activity upon discharge: activity as tolerated     Diet    Follow this diet upon discharge: Orders Placed This Encounter      Consistent Carbohydrate Diet Moderate Consistent Carb (60 g CHO per Meal) Diet         Examination   Physical Exam   Temp:  [97.3  F (36.3  C)-98.8  F (37.1  C)] 98.4  F (36.9  C)  Pulse:  [62-67] 65  Resp:  [7-26] 16  BP: (111-198)/() 160/79  SpO2:  [95 %-100 %] 97 %  Wt Readings from Last 1 Encounters:   09/16/22 82.2 kg (181 lb 4.8 oz)       Tolerating PO.  Unchanged discomfort.  Abdomen is benign on exam.  Diarrhea after gastrograffin resolved      Please see EMR for more detailed significant labs, imaging, consultant notes etc.    IErwin MD, personally saw the patient today and spent greater than 30 minutes discharging this patient.    Erwin Shaffer MD  [facility]    CC:Clinic, [facility]

## 2022-09-16 NOTE — PROGRESS NOTES
[facility]    Medicine Progress Note - Hospitalist Service    Date of Admission:  9/8/2022    Assessment & Plan          72F with poorly controlled DM2, HTN, COPD, HLD, multivessel CAD and ischemic cardiomyopathy with EF of 40%, CKD 3, admitted on 9/8/2022 with abdominal pain and found to have subacute arterial dissection of SMA.    Repeat CTA 9/12 showing tayo significant change.   Evaluated by vascular surgery and not thought to be the source of the pain.  Also had ischemic evaluation and upper endoscopy without source for pain.   There was some retained food in the stomach which raises the question of diabetic gastroparesis and this could be assessed as outpatient with a gastric emptying study.  There was also come question of a gastric outlet obstruction on endoscopy but this was not confirmed on an upper GI series.     #Abdominal pain - consider outpatient gastric emptying study.  PPI BID x 2 months then qday.  GI f/u     #Subacute arterial dissection of the SMA - asa, statin      #Multivessel CAD ischemic cardiomyopathy, EF 40% - no ischemia on nuc stress.  Continue Imdur, aspirin, Toprol-XL.     #Incidental imaging findings:  Indeterminate R adrenal nodule, ovarian cyst, R renal artery stenosis.  Outpatient f/u     #Essential hypertension - toprol-xl, started norvasc and increased to 10mg, lisinopril stopped for JESIKA     #Non-insulin-dependent diabetes mellitus - recent A1c 10.0  -continue metformin (increase to 500mg BID) and glipizide on discharge  -likely to need 3rd agent but would titrate up metformin 1st.  Has significant deductible and would make sense to max out current agents first.  F/u with PCP for ongoing titration     #RENEA/Stage III CKD  - Probably was pre-renal. Back to baseline now.       #GERD - Continue pantoprazole, dosing as above     #COPD - No exacerbation     #Hyponatremia -resolved     Diet: Consistent Carbohydrate Diet Moderate Consistent Carb (60 g CHO per Meal)  Diet  Diet     DVT Prophylaxis: Pneumatic Compression Devices  Muniz Catheter: Not present  Central Lines: None  Cardiac Monitoring: None  Code Status: Full Code      Disposition Plan   The patient's care was discussed with the Patient.    Erwin Shaffer MD  Hospitalist Service  [facility]  Securely message with the Vocera Web Console (learn more here)  Text page via [employer] Paging/Directory         Clinically Significant Risk Factors Present on Admission                      ______________________________________________________________________    Interval History     No major changes. Seems comfortable.  Diarrhea this afternoon after upper GI    Data reviewed today: I reviewed all medications, new labs and imaging results over the last 24 hours.     Physical Exam   Vital Signs: Temp: 98.4  F (36.9  C) Temp src: Temporal BP: (!) 160/79 Pulse: 65   Resp: 16 SpO2: 97 % O2 Device: None (Room air) Oxygen Delivery: 3 LPM  Weight: 181 lbs 4.8 oz  Gen: NAD  Pulm: clear, easy respiration.  CV: RRR  Abd: soft    Data   Recent Labs   Lab 09/16/22  1225 09/16/22  0755 09/16/22  0326 09/15/22  0528 09/15/22  0453 09/12/22  0847 09/12/22  0456 09/11/22  1206 09/11/22  1133 09/10/22  0855 09/10/22  0850   WBC  --   --   --   --   --   --   --   --   --   --  4.6   HGB  --   --   --   --   --   --   --   --   --   --  11.1*   MCV  --   --   --   --   --   --   --   --   --   --  93   PLT  --   --   --   --   --   --   --   --   --   --  176   NA  --   --   --   --  136  --  136  --  134*  --  138   POTASSIUM  --   --   --   --  3.6  --  4.2  --  5.0  --  4.2   CHLORIDE  --   --   --   --  110*  --  108*  --  106  --  108*   CO2  --   --   --   --  21*  --  23  --  22  --  23   BUN  --   --   --   --  16  --  17  --  14  --  22   CR  --   --   --   --  0.90  --  1.24*  --  1.14*  --  1.14*   ANIONGAP  --   --   --   --  5  --  5  --  6  --  7   DEB  --   --   --   --  8.3*  --  8.3*  --  8.8  --  8.7   GLC  149* 156* 160*   < > 160*   < > 125   < > 277*   < > 172*    < > = values in this interval not displayed.     Recent Results (from the past 24 hour(s))   XR Gastrografin Upper GI w KUB    Narrative    EXAM: XR GASTROGRAFIN UPPER GI W KUB  LOCATION: [facility]  DATE/TIME: 9/16/2022 12:03 PM    INDICATION: abnormal duodenal sweep on EGD, suggestive of extrinsic compression  COMPARISON: None.  TECHNIQUE: Routine. 200 mL of Gastrografin contrast administered    FINDINGS:   FLUOROSCOPIC TIME: 2.4 minutes  NUMBER OF IMAGES: 4 videofluoroscopic imaging series and additional 14 spot images    ESOPHAGUS: Normal. Normal peristalsis. No strictures, masses, or inflammatory changes. No gastroesophageal reflux in recumbent position.  STOMACH: Normal. No masses or inflammatory change.  DUODENUM: Patient was placed in a right lateral decubitus position to encourage emptying of the stomach into the small bowel. Contrast empties from the duodenal bulb opacifying the duodenal sweep and proximal jejunum. No stricture or reason for extrinsic   compression identified.      Impression    IMPRESSION:    Normal upper GI.     Medications       acetaminophen  975 mg Oral Q6H     amLODIPine  10 mg Oral QPM     aspirin  81 mg Oral Daily     insulin aspart   Subcutaneous Daily with breakfast     insulin aspart   Subcutaneous Daily with lunch     insulin aspart   Subcutaneous Daily with supper     insulin aspart  1-3 Units Subcutaneous TID AC     insulin glargine  5 Units Subcutaneous At Bedtime     isosorbide mononitrate  60 mg Oral Daily     lidocaine   Topical 4x Daily     [Held by provider] lisinopril  40 mg Oral Daily     metoprolol succinate ER  200 mg Oral Daily     pantoprazole  20 mg Oral BID AC     polyethylene glycol  17 g Oral Daily     rosuvastatin  40 mg Oral Daily     sodium chloride (PF)  10 mL Intravenous Once     sodium chloride (PF)  3 mL Intracatheter Q8H

## 2022-09-16 NOTE — PROGRESS NOTES
CARDIOLOGY PROGRESS NOTE      Assessment/Plan:  1.  Chest Pain and shortness of breath-atypical, stabbing, onset at rest.  Relieved after 30 minutes. Enzymes normal as is ECG.  Doubt coronary ischemia and stress test shows medium sized area of infarct involving the mid to basal lateral walls with no significant ischemia.  Most likely noncardiac and cardiology will sign off.    2.  Cardiomyopathy-ejection fraction in past 40%, on our echo 40 to 45% with wall motion abnormality involving posterior and lateral walls.  On nuclear stress test 47%.  On metoprolol and ACE inhibitor.  No need for ICD.  3.  Coronary artery disease-angiography in 2018 showed normal left main, significant mid LAD stenosis that received a stent, significant stenosis of diagonal 1 and diagonal 2 that received stents, circumflex was normal and the obtuse marginal artery had significant disease requiring a stent, and the right coronary artery distal disease required a stent.    Medical therapy given normal pharmacological stress nuclear as above.  4.  Abdominal pain, epigastric-felt to be due to ischemia involving superior mesenteric artery.  EGD shows possible gastric outlet obstruction and defer to GI/primary.   5.  Diabetes mellitus (H)-so noted with significantly elevated hemoglobin A1c of 10.0.  Defer to primary care.  6.  Hypertension- under good control.  7.  Hyperlipidemia LDL goal <100-so noted.  LP(a) significantly elevated to 223.  Eventually would like to add niacin but consider for one of her research protocols.  Total cholesterol good at 121 with an LDL of 66 on rosuvastatin.      Plan:  1.  Cardiology has nothing further inpatient to add, we will sign off, available as needed.    2.  When able might consider adding niacin or research protocol to bring down LP(a).    Discharge Plannin.  No significant cardiac barrier to discharge, more GI related.  2.  Can follow with cardiology at [facility].  3.  Would continue Imdur  "and amlodipine and aspirin and statin.     LOS: 7 days     Subjective:  Interval History:    72-year-old white female being seen on eighth day of hospitalization.  She still has abdominal discomfort, no problems with appetite, nausea, vomiting or bowels.  She denies any significant chest discomfort, palpitations, shortness of breath, PND, orthopnea or peripheral edema.  Does have a little orthostatic lightheadedness.    Medications    acetaminophen  975 mg Oral Q6H     amLODIPine  5 mg Oral QPM     aspirin  81 mg Oral Daily     insulin aspart   Subcutaneous Daily with breakfast     insulin aspart   Subcutaneous Daily with lunch     insulin aspart   Subcutaneous Daily with supper     insulin aspart  1-3 Units Subcutaneous TID AC     insulin glargine  5 Units Subcutaneous At Bedtime     isosorbide mononitrate  60 mg Oral Daily     lidocaine   Topical 4x Daily     [Held by provider] lisinopril  40 mg Oral Daily     metoprolol succinate ER  200 mg Oral Daily     pantoprazole  20 mg Oral BID AC     polyethylene glycol  17 g Oral Daily     rosuvastatin  40 mg Oral Daily     sodium chloride (PF)  10 mL Intravenous Once     sodium chloride (PF)  3 mL Intracatheter Q8H     sodium chloride (PF)  3 mL Intracatheter Q8H     Objective:   Vital signs in last 24 hours:  Temp:  [97.3  F (36.3  C)-98.8  F (37.1  C)] 98.4  F (36.9  C)  Pulse:  [62-67] 65  Resp:  [7-26] 16  BP: (111-198)/() 160/79  SpO2:  [95 %-100 %] 97 %    Physical Exam:  BP (!) 160/79   Pulse 65   Temp 98.4  F (36.9  C) (Temporal)   Resp 16   Ht 1.6 m (5' 3\")   Wt 82.2 kg (181 lb 4.8 oz)   SpO2 97%   BMI 32.12 kg/m      General Appearance:    Alert, cooperative, no distress, appears stated age   Head:    Normocephalic, without obvious abnormality, atraumatic   Throat:   Lips, mucosa, and tongue normal; teeth and gums normal   Neck:   Supple, symmetrical, trachea midline, no adenopathy;        thyroid:  No enlargement/tenderness/nodules; no carotid    " bruit or JVD   Back:     Symmetric, no curvature, ROM normal, no CVA tenderness   Lungs:     Clear to auscultation bilaterally, respirations unlabored   Chest wall:    No tenderness or deformity   Heart:    Regular rate and rhythm, S1 and S2 normal, no murmur, rub   or gallop   Abdomen:     Soft, non-tender, bowel sounds active all four quadrants,     no masses, no organomegaly   Extremities:   Normal, atraumatic, no cyanosis or edema   Pulses:   2+ and symmetric all extremities   Skin:   Skin color, texture, turgor normal, no rashes or lesions     Cardiographics:      ECG: Personally reviewed by myself shows sinus rhythm, leftward axis, possible old inferior Q wave pattern, occasional PVC.    Echocardiogram:   1. The left ventricle is normal in size. Left ventricular systolic performance is mild to moderately reduced. The ejection fraction is estimated to be 40-45%.  2. There is moderate-severe posterior and basal and mid lateral hypokinesis. There is moderate basal and mid anterior hypokinesis.  3. There is mild aortic insufficiency.  4. There is mild, to perhaps mild-moderate, mitral insufficiency.  5. Normal right ventricular size and systolic performance.  6. There is moderate left atrial enlargement.      Lab Results:   Recent Labs   Lab 09/10/22  0850   WBC 4.6   HGB 11.1*   HCT 34.3*        Recent Labs   Lab 09/15/22  0453      CO2 21*   BUN 16   .       Lab Results   Component Value Date    TROPONINI 0.02 09/14/2022

## 2022-09-16 NOTE — PLAN OF CARE
Problem: Pain Acute  Goal: Acceptable Pain Control and Functional Ability  Outcome: Ongoing, Progressing  Intervention: Develop Pain Management Plan  Recent Flowsheet Documentation  Taken 9/16/2022 0800 by Marta Arndt RN  Pain Management Interventions:   declines   rest  Intervention: Prevent or Manage Pain  Recent Flowsheet Documentation  Taken 9/16/2022 1230 by Marta Arndt RN  Medication Review/Management: medications reviewed  Taken 9/16/2022 0800 by Marta Arndt RN  Medication Review/Management: medications reviewed     Patient had 4/10 epigastric pain this morning prior to procedure, epigastric pain improving but now is experiencing abdominal cramping due to diarrhea. Patient had Gastrogaffin this afternoon which may be contributing to the diarrhea. Miralax held today. Patient denies nausea, patient opted for clear liquid lunch, tolerated fine.

## 2022-09-16 NOTE — PLAN OF CARE
Problem: Risk for Delirium  Goal: Improved Sleep  Outcome: Ongoing, Progressing     Problem: Pain Acute  Goal: Acceptable Pain Control and Functional Ability  Outcome: Ongoing, Progressing  Intervention: Develop Pain Management Plan  Recent Flowsheet Documentation  Taken 9/16/2022 0318 by Buffy Lr RN  Pain Management Interventions:    emotional support    pain management plan reviewed with patient/caregiver  Taken 9/16/2022 0026 by Buffy Lr RN  Pain Management Interventions:    emotional support    pain management plan reviewed with patient/caregiver  Taken 9/15/2022 2019 by Buffy Lr RN  Pain Management Interventions:    emotional support    pain management plan reviewed with patient/caregiver  Intervention: Prevent or Manage Pain  Recent Flowsheet Documentation  Taken 9/16/2022 0318 by Buffy Lr RN  Medication Review/Management: medications reviewed  Taken 9/16/2022 0026 by Buffy Lr RN  Medication Review/Management: medications reviewed  Taken 9/15/2022 2019 by Buffy Lr RN  Medication Review/Management: medications reviewed     Goal Outcome Evaluation:  Pt. Had increased blood pressure this shift that was controlled with PRN hydralyize.  Pain reported consistantly as a 5, pt reported that this is an acceptable level.  Pt. NPO starting at midnight for EGD today. Alert and oriented and ambulating well in restroom.

## 2022-09-16 NOTE — PRE-PROCEDURE
ENDOSCOPY INPATIENT PRE-PROCEDURE NOTE    CHIEF COMPLAINT / REASON FOR PROCEDURE:  Abdominal pain    History and Physical Reviewed (in chart and updated within 7 days:yes    PRE-SEDATION ASSESSMENT:    Lung Exam:  Normal  Heart Exam:  Normal  Mallampati Airway Classification:   III - Only soft palate is visible. Intubation is predicted to be difficult  Previous reaction to anesthesia/sedation:  no  Sedation plan based on assessment:  Moderate (conscious) sedation  Comment(s):  na    ASA Classification:  3 - Severe systemic disease, but not incapacitating    IMPRESSION:  Abdominal pain    PLAN:  EGD      Blair De La Cruz MD
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
## 2022-09-16 NOTE — PROGRESS NOTES
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   Pain (which she states was new compared to other pains she has had in the past) is slowly improving so will advance diet - discussed with vascular.  Repeat CTA 9/12 showing tayo significant change.        Abdominal pain  Possibly subacute arterial dissection of the SMA.  CTA showed 75% stenosis of proximal left renal artery, short segment of SMA stenosis, partially ulcerated atheromatous plaque proximal SMA and no evidence of bowel ischemia. As per vascular surgeon, findings are likely secondary to subacute arterial dissection.   Advance diet as tolerated  Keep SBP < 130 and HR < 70  As per vascular surgeon, no indication for antiplatelet;will keep ASA (CAD) but discontinue Plavix  Continue rosuvastatin 40 mg daily    Repeat CT scan 9/12/22 per vascular surgery service - no changes  Consult pain management  -GI consultation for possible EGD since vascular surgeon didn't think pt's pain is due to subacute arterial dissection of the SMA  -EGD tomorrow     Chest pain  Multivessel CAD ischemic cardiomyopathy, EF 40%.   -chest pain resolved.  Nuc stress with infarct  Continue PT Imdur, aspirin, Toprol-XL.     R renal artery stenosis    -defer to vascular surgeon    Incidental imaging findings:  Indeterminate R adrenal nodule and ovarian cyst - outpatient f/u     Essential hypertension  Keep SBP < 130 per vascular surgeon   Continue PTA Toprol-XL  Add norvasc to get BP below goal  Prn IV  hydralazine  Lisinopril on hold due to renal stenosis     Non-insulin-dependent diabetes mellitus.    A1c 12.4 in June  2018; repeat A1c on admit was 10.0  Hold PTA metformin and glipizide. on discharge would probably add januvia or SGLT2I given poor control  Insulin sliding scale    lantus - 16 units hs  Insulin carb coverage 1:15         RENEA/Stage III CKD   Improved at 1.14 down from Creatinine 2.0 on 9/8/22 compared to 1.14 on 8/12/22  Probably was pre-renal  Back to baseline now.       GERD  Continue pantoprazole      COPD  Not in exacerbation  Continue home inhalers.     Hyponatremia -resolved               Diet: NPO per Anesthesia Guidelines for Procedure/Surgery Except for: Meds  Consistent Carbohydrate Diet Moderate Consistent Carb (60 g CHO per Meal) Diet     DVT Prophylaxis: Pneumatic Compression Devices  Muniz Catheter: Not present  Central Lines: None  Cardiac Monitoring: None  Code Status: Full Code      Disposition Plan      Expected Discharge Date: 09/16/2022      Destination: home;home with family  Discharge Comments: Lexiscan today. Pending > 2 days     The patient's care was discussed with the Patient.    Erwin Shaffer MD  Hospitalist Service  [facility]  Securely message with the Vocera Web Console (learn more here)  Text page via [employer] Paging/Directory         Clinically Significant Risk Factors Present on Admission                      ______________________________________________________________________    Interval History     Unchanged abdominal discomfort.  Though seems quite comfortable and requesting food    Data reviewed today: I reviewed all medications, new labs and imaging results over the last 24 hours.     Physical Exam   Vital Signs: Temp: 97.3  F (36.3  C) Temp src: Oral BP: 118/56 Pulse: 62   Resp: 20 SpO2: 97 % O2 Device: None (Room air)    Weight: 178 lbs 6.4 oz  Gen: NAD  Pulm: clear, easy respiration.  CV: RRR  Abd: soft    Data   Recent Labs   Lab 09/15/22  1834 09/15/22  1415 09/15/22  0809 09/15/22  0528 09/15/22  0453 09/12/22  0847 09/12/22  0456 09/11/22  1206  09/11/22  1133 09/10/22  0855 09/10/22  0850 09/09/22  0820 09/09/22  0805   WBC  --   --   --   --   --   --   --   --   --   --  4.6  --  4.8   HGB  --   --   --   --   --   --   --   --   --   --  11.1*  --  11.1*   MCV  --   --   --   --   --   --   --   --   --   --  93  --  93   PLT  --   --   --   --   --   --   --   --   --   --  176  --  184   NA  --   --   --   --  136  --  136  --  134*  --  138  --  138   POTASSIUM  --   --   --   --  3.6  --  4.2  --  5.0  --  4.2  --  3.8   CHLORIDE  --   --   --   --  110*  --  108*  --  106  --  108*  --  108*   CO2  --   --   --   --  21*  --  23  --  22  --  23  --  24   BUN  --   --   --   --  16  --  17  --  14  --  22  --  31*   CR  --   --   --   --  0.90  --  1.24*  --  1.14*  --  1.14*  --  1.25*   ANIONGAP  --   --   --   --  5  --  5  --  6  --  7  --  6   DEB  --   --   --   --  8.3*  --  8.3*  --  8.8  --  8.7  --  8.2*   * 151* 191*   < > 160*   < > 125   < > 277*   < > 172*   < > 133*   ALBUMIN  --   --   --   --   --   --   --   --   --   --   --   --  3.2*   PROTTOTAL  --   --   --   --   --   --   --   --   --   --   --   --  6.0   BILITOTAL  --   --   --   --   --   --   --   --   --   --   --   --  0.5   ALKPHOS  --   --   --   --   --   --   --   --   --   --   --   --  52   ALT  --   --   --   --   --   --   --   --   --   --   --   --  10   AST  --   --   --   --   --   --   --   --   --   --   --   --  12    < > = values in this interval not displayed.     Recent Results (from the past 24 hour(s))   NM Lexiscan stress test (nuc card)   Result Value    Pharmacologic Protocol Lexiscan    Test Type Pharmacological    Baseline HR 58    Baseline Systolic     Baseline Diastolic BP 74    Last Stress HR 77    Last Stress Systolic     Last Stress Diastolic BP 57    Target     PERCENT HR 85%    ST Deviation Elevation I 0.3mm    Deviation Time III -0.3mm    ST Elevation Amount V2 0.6mm    ST Deviation Amount he III -0.7mm    Final  Resting /64    Final Resting HR 68    Max Treadmill Speed 0.0    Max Treadmill Grade 0.0    Peak Systolic /63    Peak Diastolic /64    Max HR  78    Stress Phase Resting    Stress Resting Pt Position MANUAL EVENT    Current HR 76    Current /57    Stress Phase Stress    Stage Minute EXE 00:00    Exercise Stage STAGE 2    Current HR 58    Current /74    Stress Phase Stress    Stage Minute EXE 01:00    Exercise Stage STAGE 3    Current HR 58    Current /74    Stress Phase Stress    Stage Minute EXE 02:00    Exercise Stage STAGE 4    Current HR 76    Current /74    Stress Phase Stress    Stage Minute EXE 02:00    Exercise Stage STAGE 4    Current HR 76    Current /59    Stress Phase Stress    Stage Minute EXE 03:00    Exercise Stage STAGE 5    Current HR 78    Current /59    Stress Phase Stress    Stage Minute EXE 03:05    Exercise Stage STAGE 5    Current HR 78    Current /57    Stress Phase Stress    Stage Minute EXE 04:00    Exercise Stage STAGE 6    Current HR 77    Current /57    Stress Phase Stress    Stage Minute EXE 04:00    Exercise Stage STAGE 6    Current HR 77    Current /57    Stress Phase Recovery    Stage Minute REC 00:24    Exercise Stage Recovery    Current HR 76    Current /57    Stress Phase Recovery    Stage Minute REC 00:59    Exercise Stage Recovery    Current HR 75    Current /57    Stress Phase Recovery    Stage Minute REC 01:39    Exercise Stage Recovery    Current HR 71    Current /63    Stress Phase Recovery    Stage Minute REC 01:59    Exercise Stage Recovery    Current HR 70    Current /63    Stress Phase Recovery    Stage Minute REC 02:57    Exercise Stage Recovery    Current HR 68    Current /64    Stress Phase Recovery    Stage Minute REC 02:59    Exercise Stage Recovery    Current HR 68    Current /64    Stress Phase Recovery    Stage Minute REC 03:59    Exercise Stage Recovery     Current HR 68    Current /64    Stress Phase Recovery    Stage Minute REC 04:04    Exercise Stage Recovery    Current HR 68    Current /64    Max Predicted HR  52    Rate Pressure Product 7,956.0    Left Ventricular EF 47    Narrative       The pharmacologic nuclear stress test is abnormal. No evidence of   ischemia. There is a medium-sized severe fixed perfusion defect in the   sza-et-dydbw lateral segment(s) of the left ventricle consistent with   prior transmural myocardial infarction in the territory of the left   circumflex artery.     The left ventricular ejection fraction at stress is 47% with lateral   wall akinesis.     The patient is at a low risk of future cardiac ischemic events.     There is no prior study for comparison.     The results were communicated to the inpatient cardiologist, Dr. Abel Benavides, at 1429 hours on 9/15/2022.     Medications       acetaminophen  975 mg Oral Q6H     amLODIPine  5 mg Oral QPM     aspirin  81 mg Oral Daily     insulin aspart   Subcutaneous Daily with breakfast     insulin aspart   Subcutaneous Daily with lunch     insulin aspart   Subcutaneous Daily with supper     insulin aspart  1-3 Units Subcutaneous TID AC     insulin glargine  5 Units Subcutaneous At Bedtime     isosorbide mononitrate  60 mg Oral Daily     lidocaine   Topical 4x Daily     [Held by provider] lisinopril  40 mg Oral Daily     metoprolol succinate ER  200 mg Oral Daily     pantoprazole  20 mg Oral BID AC     polyethylene glycol  17 g Oral Daily     rosuvastatin  40 mg Oral Daily     sodium chloride (PF)  10 mL Intravenous Once     sodium chloride (PF)  3 mL Intracatheter Q8H     sodium chloride (PF)  3 mL Intracatheter Q8H

## 2022-09-17 VITALS
OXYGEN SATURATION: 96 % | SYSTOLIC BLOOD PRESSURE: 163 MMHG | BODY MASS INDEX: 31.93 KG/M2 | DIASTOLIC BLOOD PRESSURE: 72 MMHG | WEIGHT: 180.2 LBS | HEIGHT: 63 IN | TEMPERATURE: 98.3 F | HEART RATE: 60 BPM | RESPIRATION RATE: 18 BRPM

## 2022-09-17 LAB
ANION GAP SERPL CALCULATED.3IONS-SCNC: 4 MMOL/L (ref 5–18)
BUN SERPL-MCNC: 15 MG/DL (ref 8–28)
CALCIUM SERPL-MCNC: 8.6 MG/DL (ref 8.5–10.5)
CHLORIDE BLD-SCNC: 111 MMOL/L (ref 98–107)
CO2 SERPL-SCNC: 23 MMOL/L (ref 22–31)
CREAT SERPL-MCNC: 0.82 MG/DL (ref 0.6–1.1)
GFR SERPL CREATININE-BSD FRML MDRD: 76 ML/MIN/1.73M2
GLUCOSE BLD-MCNC: 98 MG/DL (ref 70–125)
GLUCOSE BLDC GLUCOMTR-MCNC: 94 MG/DL (ref 70–99)
POTASSIUM BLD-SCNC: 3.9 MMOL/L (ref 3.5–5)
SODIUM SERPL-SCNC: 138 MMOL/L (ref 136–145)

## 2022-09-17 PROCEDURE — 99239 HOSP IP/OBS DSCHRG MGMT >30: CPT | Performed by: HOSPITALIST

## 2022-09-17 PROCEDURE — 250N000013 HC RX MED GY IP 250 OP 250 PS 637: Performed by: INTERNAL MEDICINE

## 2022-09-17 PROCEDURE — 84520 ASSAY OF UREA NITROGEN: CPT | Performed by: HOSPITALIST

## 2022-09-17 PROCEDURE — 36415 COLL VENOUS BLD VENIPUNCTURE: CPT | Performed by: HOSPITALIST

## 2022-09-17 RX ORDER — AMLODIPINE BESYLATE 10 MG/1
10 TABLET ORAL EVERY EVENING
Qty: 30 TABLET | Refills: 3 | Status: SHIPPED | OUTPATIENT
Start: 2022-09-17

## 2022-09-17 RX ADMIN — ACETAMINOPHEN 975 MG: 325 TABLET, FILM COATED ORAL at 01:03

## 2022-09-17 RX ADMIN — METOPROLOL SUCCINATE 200 MG: 100 TABLET, EXTENDED RELEASE ORAL at 08:39

## 2022-09-17 RX ADMIN — LIDOCAINE: 50 OINTMENT TOPICAL at 08:40

## 2022-09-17 RX ADMIN — ACETAMINOPHEN 975 MG: 325 TABLET, FILM COATED ORAL at 08:39

## 2022-09-17 RX ADMIN — ISOSORBIDE MONONITRATE 60 MG: 60 TABLET, EXTENDED RELEASE ORAL at 08:39

## 2022-09-17 RX ADMIN — ROSUVASTATIN CALCIUM 40 MG: 40 TABLET, FILM COATED ORAL at 08:39

## 2022-09-17 RX ADMIN — ASPIRIN 81 MG: 81 TABLET ORAL at 08:45

## 2022-09-17 RX ADMIN — PANTOPRAZOLE SODIUM 20 MG: 20 TABLET, DELAYED RELEASE ORAL at 06:59

## 2022-09-17 ASSESSMENT — ACTIVITIES OF DAILY LIVING (ADL)
ADLS_ACUITY_SCORE: 24

## 2022-09-17 NOTE — PLAN OF CARE
Problem: Pain Acute  Goal: Acceptable Pain Control and Functional Ability  Outcome: Ongoing, Progressing  Intervention: Prevent or Manage Pain  Recent Flowsheet Documentation  Taken 9/17/2022 0450 by Christine Honeycutt, RN  Medication Review/Management: medications reviewed  Taken 9/17/2022 0103 by Christine Honeycutt, RN  Medication Review/Management: medications reviewed     Goal Outcome Evaluation:         Still having abdominal pain, rated 5 out of 10. Gave scheduled tylenol. Slept after. Only had 1 stool tonight.

## 2022-09-17 NOTE — PROGRESS NOTES
Care Management Discharge Note    Discharge Date: 09/17/2022       Discharge Disposition: Home    Discharge Services:      Discharge DME:      Discharge Transportation: family or friend will provide    Private pay costs discussed: Not applicable    PAS Confirmation Code:    Patient/family educated on Medicare website which has current facility and service quality ratings:      Education Provided on the Discharge Plan:    Persons Notified of Discharge Plans: MD has placed orders  Patient/Family in Agreement with the Plan: yes    Handoff Referral Completed: Yes    Additional Information:      Plan for pt to return home to apt with spouse; family transport.       No therapy consults. No CM needs identified.         SMITHA Castro

## 2022-09-17 NOTE — PLAN OF CARE
Problem: Plan of Care - These are the overarching goals to be used throughout the patient stay.    Goal: Plan of Care Review/Shift Note  Description: The Plan of Care Review/Shift note should be completed every shift.  The Outcome Evaluation is a brief statement about your assessment that the patient is improving, declining, or no change.  This information will be displayed automatically on your shift note.  Outcome: Met   Goal Outcome Evaluation:    Patient is discharging home with family. Instructions reviewed with patient.

## 2022-09-17 NOTE — PLAN OF CARE
Vital signs stable. Complained of diarrhea this morning and slowing down tonight. Complained of discomfort in bed, scheduled tylenol given. Will continue to monitor.

## 2022-09-20 ENCOUNTER — PATIENT OUTREACH (OUTPATIENT)
Dept: CARE COORDINATION | Facility: CLINIC | Age: 72
End: 2022-09-20

## 2022-09-20 NOTE — PROGRESS NOTES
Connected Care Resource Center Contact  [facility]/Voicemail     Clinical Data: Transitional Care Management Outreach     Outreach attempted x 2.  Left message on patient's voicemail, providing [facility]'s 24/7 scheduling and nurse triage phone number [phone] ([phone]) for questions/concerns and/or to schedule an appt with an [facility] provider, if they do not have a PCP.      Plan:  [facility] will do no further outreaches at this time.       LILLY Styles  Connected Care Resource [place], [facility]    *Connected Care Resource Team does NOT follow patient ongoing. Referrals are identified based on internal discharge reports and the outreach is to ensure patient has an understanding of their discharge instructions.

## 2022-09-22 LAB
PATH REPORT.ADDENDUM SPEC: NORMAL
PATH REPORT.COMMENTS IMP SPEC: NORMAL
PATH REPORT.COMMENTS IMP SPEC: NORMAL
PATH REPORT.FINAL DX SPEC: NORMAL
PATH REPORT.GROSS SPEC: NORMAL
PATH REPORT.MICROSCOPIC SPEC OTHER STN: NORMAL
PATH REPORT.RELEVANT HX SPEC: NORMAL
PHOTO IMAGE: NORMAL

## 2022-09-22 PROCEDURE — 88313 SPECIAL STAINS GROUP 2: CPT | Mod: 26 | Performed by: PATHOLOGY

## 2022-09-22 PROCEDURE — 88342 IMHCHEM/IMCYTCHM 1ST ANTB: CPT | Mod: 26 | Performed by: PATHOLOGY

## 2022-09-22 PROCEDURE — 88305 TISSUE EXAM BY PATHOLOGIST: CPT | Mod: 26 | Performed by: PATHOLOGY

## 2022-12-28 ENCOUNTER — LAB REQUISITION (OUTPATIENT)
Dept: LAB | Facility: CLINIC | Age: 72
End: 2022-12-28
Payer: COMMERCIAL

## 2022-12-28 LAB
CREAT UR-MCNC: 222 MG/DL
MICROALBUMIN UR-MCNC: 1017 MG/L
MICROALBUMIN/CREAT UR: 458.11 MG/G CR (ref 0–25)

## 2022-12-28 PROCEDURE — 82570 ASSAY OF URINE CREATININE: CPT | Mod: ORL | Performed by: FAMILY MEDICINE

## 2023-03-29 ENCOUNTER — LAB REQUISITION (OUTPATIENT)
Dept: LAB | Facility: CLINIC | Age: 73
End: 2023-03-29
Payer: COMMERCIAL

## 2023-03-29 LAB
ANION GAP SERPL CALCULATED.3IONS-SCNC: 14 MMOL/L (ref 7–15)
BUN SERPL-MCNC: 35.7 MG/DL (ref 8–23)
CALCIUM SERPL-MCNC: 9.3 MG/DL (ref 8.8–10.2)
CHLORIDE SERPL-SCNC: 102 MMOL/L (ref 98–107)
CREAT SERPL-MCNC: 1.37 MG/DL (ref 0.51–0.95)
DEPRECATED HCO3 PLAS-SCNC: 22 MMOL/L (ref 22–29)
GFR SERPL CREATININE-BSD FRML MDRD: 41 ML/MIN/1.73M2
GLUCOSE SERPL-MCNC: 300 MG/DL (ref 70–99)
POTASSIUM SERPL-SCNC: 4.8 MMOL/L (ref 3.4–5.3)
SODIUM SERPL-SCNC: 138 MMOL/L (ref 136–145)

## 2023-03-29 PROCEDURE — 80048 BASIC METABOLIC PNL TOTAL CA: CPT | Mod: ORL | Performed by: FAMILY MEDICINE

## 2023-10-12 ENCOUNTER — LAB REQUISITION (OUTPATIENT)
Dept: LAB | Facility: CLINIC | Age: 73
End: 2023-10-12
Payer: COMMERCIAL

## 2023-10-12 DIAGNOSIS — E11.65 TYPE 2 DIABETES MELLITUS WITH HYPERGLYCEMIA (H): ICD-10-CM

## 2023-10-12 DIAGNOSIS — I25.10 ATHEROSCLEROTIC HEART DISEASE OF NATIVE CORONARY ARTERY WITHOUT ANGINA PECTORIS: ICD-10-CM

## 2023-10-12 LAB
ALBUMIN SERPL BCG-MCNC: 3.9 G/DL (ref 3.5–5.2)
ALP SERPL-CCNC: 53 U/L (ref 35–104)
ALT SERPL W P-5'-P-CCNC: 18 U/L (ref 0–50)
ANION GAP SERPL CALCULATED.3IONS-SCNC: 12 MMOL/L (ref 7–15)
AST SERPL W P-5'-P-CCNC: 17 U/L (ref 0–45)
BILIRUB SERPL-MCNC: 0.4 MG/DL
BUN SERPL-MCNC: 31 MG/DL (ref 8–23)
CALCIUM SERPL-MCNC: 9.1 MG/DL (ref 8.8–10.2)
CHLORIDE SERPL-SCNC: 102 MMOL/L (ref 98–107)
CHOLEST SERPL-MCNC: 124 MG/DL
CREAT SERPL-MCNC: 1.06 MG/DL (ref 0.51–0.95)
DEPRECATED HCO3 PLAS-SCNC: 26 MMOL/L (ref 22–29)
EGFRCR SERPLBLD CKD-EPI 2021: 56 ML/MIN/1.73M2
GLUCOSE SERPL-MCNC: 251 MG/DL (ref 70–99)
HDLC SERPL-MCNC: 39 MG/DL
LDLC SERPL CALC-MCNC: 64 MG/DL
NONHDLC SERPL-MCNC: 85 MG/DL
POTASSIUM SERPL-SCNC: 4.4 MMOL/L (ref 3.4–5.3)
PROT SERPL-MCNC: 6.8 G/DL (ref 6.4–8.3)
SODIUM SERPL-SCNC: 140 MMOL/L (ref 135–145)
TRIGL SERPL-MCNC: 107 MG/DL

## 2023-10-12 PROCEDURE — 80061 LIPID PANEL: CPT | Mod: ORL | Performed by: FAMILY MEDICINE

## 2023-10-12 PROCEDURE — 82570 ASSAY OF URINE CREATININE: CPT | Mod: ORL | Performed by: FAMILY MEDICINE

## 2023-10-12 PROCEDURE — 80053 COMPREHEN METABOLIC PANEL: CPT | Mod: ORL | Performed by: FAMILY MEDICINE

## 2023-10-13 LAB
CREAT UR-MCNC: 296 MG/DL
MICROALBUMIN UR-MCNC: >4400 MG/L
MICROALBUMIN/CREAT UR: NORMAL MG/G{CREAT}

## 2024-02-07 ENCOUNTER — LAB REQUISITION (OUTPATIENT)
Dept: LAB | Facility: CLINIC | Age: 74
End: 2024-02-07
Payer: COMMERCIAL

## 2024-02-07 DIAGNOSIS — E55.9 VITAMIN D DEFICIENCY, UNSPECIFIED: ICD-10-CM

## 2024-02-07 DIAGNOSIS — I10 ESSENTIAL (PRIMARY) HYPERTENSION: ICD-10-CM

## 2024-02-07 PROCEDURE — 80053 COMPREHEN METABOLIC PANEL: CPT | Mod: ORL | Performed by: FAMILY MEDICINE

## 2024-02-07 PROCEDURE — 82306 VITAMIN D 25 HYDROXY: CPT | Mod: ORL | Performed by: FAMILY MEDICINE

## 2024-02-08 LAB
ALBUMIN SERPL BCG-MCNC: 3.9 G/DL (ref 3.5–5.2)
ALP SERPL-CCNC: 56 U/L (ref 40–150)
ALT SERPL W P-5'-P-CCNC: 21 U/L (ref 0–50)
ANION GAP SERPL CALCULATED.3IONS-SCNC: 12 MMOL/L (ref 7–15)
AST SERPL W P-5'-P-CCNC: 19 U/L (ref 0–45)
BILIRUB SERPL-MCNC: 0.6 MG/DL
BUN SERPL-MCNC: 24.3 MG/DL (ref 8–23)
CALCIUM SERPL-MCNC: 9 MG/DL (ref 8.8–10.2)
CHLORIDE SERPL-SCNC: 103 MMOL/L (ref 98–107)
CREAT SERPL-MCNC: 1.09 MG/DL (ref 0.51–0.95)
DEPRECATED HCO3 PLAS-SCNC: 23 MMOL/L (ref 22–29)
EGFRCR SERPLBLD CKD-EPI 2021: 53 ML/MIN/1.73M2
GLUCOSE SERPL-MCNC: 217 MG/DL (ref 70–99)
POTASSIUM SERPL-SCNC: 4.2 MMOL/L (ref 3.4–5.3)
PROT SERPL-MCNC: 7.1 G/DL (ref 6.4–8.3)
SODIUM SERPL-SCNC: 138 MMOL/L (ref 135–145)
VIT D+METAB SERPL-MCNC: 22 NG/ML (ref 20–50)

## 2024-03-21 NOTE — PROGRESS NOTES
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   Pain (which she states was new compared to other pains she has had in the past) is slowly improving so will advance diet - discussed with vascular.  Repeat CTA 9/12 showing tayo significant change.        Abdominal pain  Possibly subacute arterial dissection of the SMA.  CTA showed 75% stenosis of proximal left renal artery, short segment of SMA stenosis, partially ulcerated atheromatous plaque proximal SMA and no evidence of bowel ischemia. As per vascular surgeon, findings are likely secondary to subacute arterial dissection.   Advance diet as tolerated  Keep SBP < 130 and HR < 70  Continue Aspirin and plavix  Continue rosuvastatin 40 mg daily    Repeat CT scan 9/12/22 per vascular surgery service - no changes  Consult pain management     R renal artery stenosis      Incidental imaging findings:  Indeterminate R adrenal nodule and ovarian cyst - outpatient f/u     Essential hypertension  Keep SBP < 130 per vascular surgeon   Continue PTA lisinopril and Toprol-XL  Add norvasc to get BP below goal  Prn labetalol IV        Multivessel CAD ischemic cardiomyopathy, EF 40%.   Chest pain   Troponin negative X3.  NSR on EKG.  Continue PT Imdur, aspirin, lisinopril (hold after contrast ensure Cr stable), Toprol-XL.     Non-insulin-dependent diabetes mellitus.    A1c 12.4 in June 2018; repeat A1c on admit was 10.0  Hold PTA metformin and glipizide. on discharge would probably add januvia or SGLT2I given poor control  Insulin sliding scale    lantus - 16 units hs  Insulin carb coverage 1:15         RENEA/Stage  Patient called requesting refill for glimepiride , last office visit 3/7/2024   III CKD   Improved at 1.14 down from Creatinine 2.0 on 9/8/22 compared to 1.14 on 8/12/22  Probably was pre-renal  Back to baseline now.       GERD  Continue pantoprazole      COPD  Not in exacerbation  Continue home inhalers.     Hyponatremia -resolved             Diet: Combination Diet Moderate Consistent Carb (60 g CHO per Meal) Diet    DVT Prophylaxis: Pneumatic Compression Devices  Muniz Catheter: Not present  Central Lines: None  Cardiac Monitoring: None  Code Status: Full Code      Disposition Plan      Expected Discharge Date: 09/13/2022,  6:00 PM    Destination: home;home with family          The patient's care was discussed with the Patient.    Riky Dorado MD  Hospitalist Service  [facility]  Securely message with the Vocera Web Console (learn more here)  Text page via [employer] Paging/Directory         Clinically Significant Risk Factors Present on Admission                      ______________________________________________________________________    Interval History   Worsening upper abd pain, no n/v, no f/c. No cp/sob.     Data reviewed today: I reviewed all medications, new labs and imaging results over the last 24 hours. I personally reviewed the chest CT image(s) showing no change compared to last cta.    Physical Exam   Vital Signs: Temp: 98.4  F (36.9  C) Temp src: Oral BP: (!) 158/72 Pulse: 68   Resp: 20 SpO2: 92 % O2 Device: None (Room air)    Weight: 176 lbs 12.8 oz  General.  Awake alert oriented in abd pain.  HEENT.  Pupils equal round react to light, anicteric, EOM intact.  Neck supple no JVD.  CVS regular rhythm no murmur gallops.  Lungs.  Clear to auscultation bilateral no wheezing or rales.  Abdomen.  Epigastric tender+, bowel sounds present.  Extremities.  No edema no calf tenderness.  Neurological.  Awake and alert. No focal deficit.  Skin no rash. No pallor.  Psych. Upset in pain      Data   Recent Labs   Lab 09/13/22  1201 09/13/22  0724 09/12/22  2111  09/12/22  0847 09/12/22  0456 09/11/22  1206 09/11/22  1133 09/10/22  0855 09/10/22  0850 09/09/22  0820 09/09/22  0805 09/09/22  0022 09/08/22  1609   WBC  --   --   --   --   --   --   --   --  4.6  --  4.8  --  6.7   HGB  --   --   --   --   --   --   --   --  11.1*  --  11.1*  --  11.9   MCV  --   --   --   --   --   --   --   --  93  --  93  --  94   PLT  --   --   --   --   --   --   --   --  176  --  184  --  200   NA  --   --   --   --  136  --  134*  --  138  --  138  --  137   POTASSIUM  --   --   --   --  4.2  --  5.0  --  4.2  --  3.8  --  4.2   CHLORIDE  --   --   --   --  108*  --  106  --  108*  --  108*  --  103   CO2  --   --   --   --  23  --  22  --  23  --  24  --  24   BUN  --   --   --   --  17  --  14  --  22  --  31*  --  40*   CR  --   --   --   --  1.24*  --  1.14*  --  1.14*  --  1.25*  --  2.00*   ANIONGAP  --   --   --   --  5  --  6  --  7  --  6  --  10   DEB  --   --   --   --  8.3*  --  8.8  --  8.7  --  8.2*  --  9.0   GLC 98 92 164*   < > 125   < > 277*   < > 172*   < > 133*   < > 258*   ALBUMIN  --   --   --   --   --   --   --   --   --   --  3.2*  --  3.8   PROTTOTAL  --   --   --   --   --   --   --   --   --   --  6.0  --  7.2   BILITOTAL  --   --   --   --   --   --   --   --   --   --  0.5  --  0.7   ALKPHOS  --   --   --   --   --   --   --   --   --   --  52  --  60   ALT  --   --   --   --   --   --   --   --   --   --  10  --  14   AST  --   --   --   --   --   --   --   --   --   --  12  --  13   LIPASE  --   --   --   --   --   --   --   --   --   --   --   --  <9    < > = values in this interval not displayed.     Recent Results (from the past 24 hour(s))   CTA Chest Abdomen Pelvis w Contrast    Narrative    EXAM: CTA CHEST ABDOMEN PELVIS W CONTRAST  LOCATION: [facility]  DATE/TIME: 9/12/2022 6:13 PM    INDICATION: Reevaluate SMA dissection  COMPARISON: CTA abdomen and pelvis 09/08/2022  TECHNIQUE: CT angiogram chest abdomen pelvis during  arterial phase of injection of IV contrast. 2D and 3D MIP reconstructions were performed by the CT technologist. Dose reduction techniques were used.   CONTRAST: 100ml isovue 370    FINDINGS:   CT ANGIOGRAM CHEST, ABDOMEN, AND PELVIS: Due to technical factors arterial phase imaging was not obtained, and only delayed imaging was performed.     Advanced atherosclerotic disease of the aortic arch. No evidence for thoracic aortic dissection or aneurysm. A small focus of dissection or intraluminal thrombus again identified within the proximal superior mesenteric artery on images 133 through 137 of   5 series 5, similar in appearance to the previous exam. The superior mesenteric artery distally is patent. Atherosclerotic disease abdominal aorta and iliac arteries. No evidence for aneurysm. Minimal plaque at the origin of the celiac trunk, without   significant narrowing. The inferior mesenteric artery is patent. Plaque and moderate to high-grade stenosis of the left renal artery, suboptimally opacified. Incidental retroaortic left renal vein.    LUNGS AND PLEURA: Curvilinear scarring or atelectasis both lower lobes, unchanged. No new infiltrates or pleural effusions.    MEDIASTINUM/AXILLAE: No enlarged mediastinal or hilar nodes. Borderline cardiomegaly.    CORONARY ARTERY CALCIFICATION: Severe. Stent in the LAD.    HEPATOBILIARY: Cholecystectomy, likely accounting for dilatation of the common bile duct, unchanged.    PANCREAS: Normal.    SPLEEN: Normal.    ADRENAL GLANDS: Enhancing 2.8 x 2.2 cm right adrenal mass is unchanged.    KIDNEYS/BLADDER: No renal calculi or hydronephrosis. Tiny cyst right kidney. Small amount of air in the bladder.    BOWEL: Marked distention of the stomach with ingested material. No evidence for bowel obstruction. Scattered diverticula throughout the colon, without evidence for diverticulitis.    LYMPH NODES: Normal.    PELVIC ORGANS: 3.3 x 2.5 cm right adnexal cyst is  unchanged.    MUSCULOSKELETAL: Mild hypertrophic changes of spine.      Impression    IMPRESSION:  1.  No significant change in the appearance of the nonocclusive intraluminal thrombus or small dissection proximal superior mesenteric artery, suboptimally opacified due to technical issues.  2.  Indeterminate, enhancing right adrenal mass is unchanged. Considerations would include a lipid poor adenoma versus other neoplasm. Recommend follow-up noncontrast CT exam in 3 months for reevaluation.  3.  Stable right adnexal cyst. Please see guidelines described on previous report for further evaluation.  4.  Gas within the bladder presumed iatrogenic. Clinical correlation recommended.           Medications       amLODIPine  5 mg Oral QPM     aspirin  81 mg Oral Daily     clopidogrel  75 mg Oral Daily     insulin aspart   Subcutaneous Daily with breakfast     insulin aspart   Subcutaneous Daily with lunch     insulin aspart   Subcutaneous Daily with supper     insulin aspart  1-3 Units Subcutaneous TID AC     insulin aspart  1-3 Units Subcutaneous At Bedtime     insulin glargine  18 Units Subcutaneous At Bedtime     isosorbide mononitrate  60 mg Oral Daily     [Held by provider] lisinopril  40 mg Oral Daily     metoprolol succinate ER  200 mg Oral Daily     pantoprazole  20 mg Oral BID AC     polyethylene glycol  17 g Oral Daily     rosuvastatin  40 mg Oral Daily     sodium chloride (PF)  3 mL Intracatheter Q8H

## 2024-05-31 NOTE — NURSING NOTE
"Injectable Influenza Immunization Documentation    1.  Has the patient received the information for the injectable influenza vaccine? YES     2. Is the patient 6 months of age or older? YES     3. Does the patient have any of the following contraindications?         Severe allergy to eggs?  No     Severe allergic reaction to previous influenza vaccines?  No   Severe allergy to latex?  No       History of Guillain-[place] syndrome?  No     Currently have moderate or severe illness?  No        4.  Severely egg allergic patients should have flu vaccine eligibility assessed by an MD, RN, or pharmacist, and those who received flu vaccine should be observed for 15 min by an MD, RN, Pharmacist, Medical Technician, or member of clinic staff.\": NO    5. Latex-allergic patients should be given latex-free influenza vaccine No. Please reference the Vaccine latex table to determine if your clinic s product is latex-containing.       Vaccination given by Christine Escalante LPN 4:22 PM on 9/25/2017      " Alert

## 2024-06-03 ENCOUNTER — LAB REQUISITION (OUTPATIENT)
Dept: LAB | Facility: CLINIC | Age: 74
End: 2024-06-03
Payer: COMMERCIAL

## 2024-06-03 DIAGNOSIS — I10 ESSENTIAL (PRIMARY) HYPERTENSION: ICD-10-CM

## 2024-06-03 PROCEDURE — 80053 COMPREHEN METABOLIC PANEL: CPT | Mod: ORL | Performed by: FAMILY MEDICINE

## 2024-06-04 LAB
ALBUMIN SERPL BCG-MCNC: 3.9 G/DL (ref 3.5–5.2)
ALP SERPL-CCNC: 54 U/L (ref 40–150)
ALT SERPL W P-5'-P-CCNC: 17 U/L (ref 0–50)
ANION GAP SERPL CALCULATED.3IONS-SCNC: 13 MMOL/L (ref 7–15)
AST SERPL W P-5'-P-CCNC: 14 U/L (ref 0–45)
BILIRUB SERPL-MCNC: 0.3 MG/DL
BUN SERPL-MCNC: 41.8 MG/DL (ref 8–23)
CALCIUM SERPL-MCNC: 9.3 MG/DL (ref 8.8–10.2)
CHLORIDE SERPL-SCNC: 104 MMOL/L (ref 98–107)
CREAT SERPL-MCNC: 1.28 MG/DL (ref 0.51–0.95)
DEPRECATED HCO3 PLAS-SCNC: 23 MMOL/L (ref 22–29)
EGFRCR SERPLBLD CKD-EPI 2021: 44 ML/MIN/1.73M2
GLUCOSE SERPL-MCNC: 196 MG/DL (ref 70–99)
POTASSIUM SERPL-SCNC: 5.3 MMOL/L (ref 3.4–5.3)
PROT SERPL-MCNC: 6.7 G/DL (ref 6.4–8.3)
SODIUM SERPL-SCNC: 140 MMOL/L (ref 135–145)

## 2025-05-29 ENCOUNTER — LAB REQUISITION (OUTPATIENT)
Dept: LAB | Facility: CLINIC | Age: 75
End: 2025-05-29
Payer: COMMERCIAL

## 2025-05-29 DIAGNOSIS — M81.0 AGE-RELATED OSTEOPOROSIS WITHOUT CURRENT PATHOLOGICAL FRACTURE: ICD-10-CM

## 2025-05-29 DIAGNOSIS — E11.65 TYPE 2 DIABETES MELLITUS WITH HYPERGLYCEMIA (H): ICD-10-CM

## 2025-05-29 DIAGNOSIS — I25.10 ATHEROSCLEROTIC HEART DISEASE OF NATIVE CORONARY ARTERY WITHOUT ANGINA PECTORIS: ICD-10-CM

## 2025-05-29 PROCEDURE — 80053 COMPREHEN METABOLIC PANEL: CPT | Mod: ORL | Performed by: FAMILY MEDICINE

## 2025-05-29 PROCEDURE — 82043 UR ALBUMIN QUANTITATIVE: CPT | Mod: ORL | Performed by: FAMILY MEDICINE

## 2025-05-29 PROCEDURE — 80061 LIPID PANEL: CPT | Mod: ORL | Performed by: FAMILY MEDICINE

## 2025-05-29 PROCEDURE — 82306 VITAMIN D 25 HYDROXY: CPT | Mod: ORL | Performed by: FAMILY MEDICINE

## 2025-05-30 LAB
ALBUMIN SERPL BCG-MCNC: 3.8 G/DL (ref 3.5–5.2)
ALP SERPL-CCNC: 54 U/L (ref 40–150)
ALT SERPL W P-5'-P-CCNC: 12 U/L (ref 0–50)
ANION GAP SERPL CALCULATED.3IONS-SCNC: 13 MMOL/L (ref 7–15)
AST SERPL W P-5'-P-CCNC: 14 U/L (ref 0–45)
BILIRUB SERPL-MCNC: 0.4 MG/DL
BUN SERPL-MCNC: 52.3 MG/DL (ref 8–23)
CALCIUM SERPL-MCNC: 9.3 MG/DL (ref 8.8–10.4)
CHLORIDE SERPL-SCNC: 102 MMOL/L (ref 98–107)
CHOLEST SERPL-MCNC: 131 MG/DL
CREAT SERPL-MCNC: 1.8 MG/DL (ref 0.51–0.95)
CREAT UR-MCNC: 222 MG/DL
EGFRCR SERPLBLD CKD-EPI 2021: 29 ML/MIN/1.73M2
FASTING STATUS PATIENT QL REPORTED: NO
FASTING STATUS PATIENT QL REPORTED: NO
GLUCOSE SERPL-MCNC: 181 MG/DL (ref 70–99)
HCO3 SERPL-SCNC: 23 MMOL/L (ref 22–29)
HDLC SERPL-MCNC: 37 MG/DL
LDLC SERPL CALC-MCNC: 72 MG/DL
MICROALBUMIN UR-MCNC: 795.6 MG/L
MICROALBUMIN/CREAT UR: 358.38 MG/G CR (ref 0–25)
NONHDLC SERPL-MCNC: 94 MG/DL
POTASSIUM SERPL-SCNC: 5.1 MMOL/L (ref 3.4–5.3)
PROT SERPL-MCNC: 6.6 G/DL (ref 6.4–8.3)
SODIUM SERPL-SCNC: 138 MMOL/L (ref 135–145)
TRIGL SERPL-MCNC: 110 MG/DL
VIT D+METAB SERPL-MCNC: 17 NG/ML (ref 20–50)

## (undated) DEVICE — TUBING SUCTION MEDI-VAC 1/4"X20' N620A - HE

## (undated) DEVICE — CONNECTOR ONE-LINK INJECTION SITE LF 7N8399

## (undated) DEVICE — SUCTION CANISTER 1000ML 65651-510

## (undated) DEVICE — TUBING ENDOGATOR + H2O PORT CO

## (undated) DEVICE — KIT SCOPE CLEANING ENDOSCOPY BX00719705

## (undated) DEVICE — KIT CONNECTOR FOR OLYMPUS ENDOSCOPES DEFENDO 100310

## (undated) DEVICE — PLATE GROUNDING ADULT W/CORD 9165L

## (undated) DEVICE — KIT IV START DYNDV1431

## (undated) DEVICE — CANNULA NASAL COMFORT SOFT 25FT 0537

## (undated) DEVICE — BITE BLOCK SCOPE DISP 20 X 27 000429

## (undated) DEVICE — SYR 03ML LL W/O NDL 309657

## (undated) DEVICE — CATH SUCTION 14FR W/O CTRL DYND41962

## (undated) DEVICE — SWABCAP DISINFECTANT GREEN CFF10-250

## (undated) DEVICE — FORCEP BIOPSY 2.3MM DISP COATED 000388

## (undated) DEVICE — SOL WATER IRRIG 500ML BOTTLE 2F7113

## (undated) RX ORDER — METHYLPREDNISOLONE ACETATE 80 MG/ML
INJECTION, SUSPENSION INTRA-ARTICULAR; INTRALESIONAL; INTRAMUSCULAR; SOFT TISSUE
Status: DISPENSED
Start: 2018-11-21

## (undated) RX ORDER — DEXAMETHASONE SODIUM PHOSPHATE 10 MG/ML
INJECTION, SOLUTION INTRAMUSCULAR; INTRAVENOUS
Status: DISPENSED
Start: 2018-11-21

## (undated) RX ORDER — BUPIVACAINE HYDROCHLORIDE 5 MG/ML
INJECTION, SOLUTION EPIDURAL; INTRACAUDAL
Status: DISPENSED
Start: 2018-11-21

## (undated) RX ORDER — LIDOCAINE HYDROCHLORIDE 10 MG/ML
INJECTION, SOLUTION INFILTRATION; PERINEURAL
Status: DISPENSED
Start: 2018-07-31

## (undated) RX ORDER — TRIAMCINOLONE ACETONIDE 40 MG/ML
INJECTION, SUSPENSION INTRA-ARTICULAR; INTRAMUSCULAR
Status: DISPENSED
Start: 2018-07-31

## (undated) RX ORDER — LIDOCAINE HYDROCHLORIDE 10 MG/ML
INJECTION, SOLUTION EPIDURAL; INFILTRATION; INTRACAUDAL; PERINEURAL
Status: DISPENSED
Start: 2018-11-21